# Patient Record
Sex: FEMALE | Race: BLACK OR AFRICAN AMERICAN | Employment: OTHER | URBAN - METROPOLITAN AREA
[De-identification: names, ages, dates, MRNs, and addresses within clinical notes are randomized per-mention and may not be internally consistent; named-entity substitution may affect disease eponyms.]

---

## 2022-06-10 ENCOUNTER — OFFICE VISIT (OUTPATIENT)
Dept: ORTHOPEDIC SURGERY | Age: 87
End: 2022-06-10
Payer: MEDICARE

## 2022-06-10 VITALS — WEIGHT: 156 LBS | BODY MASS INDEX: 31.45 KG/M2 | HEIGHT: 59 IN

## 2022-06-10 DIAGNOSIS — M16.11 PRIMARY OSTEOARTHRITIS OF RIGHT HIP: ICD-10-CM

## 2022-06-10 DIAGNOSIS — M54.16 LUMBAR RADICULOPATHY: Primary | ICD-10-CM

## 2022-06-10 PROCEDURE — G8427 DOCREV CUR MEDS BY ELIG CLIN: HCPCS | Performed by: ORTHOPAEDIC SURGERY

## 2022-06-10 PROCEDURE — 1090F PRES/ABSN URINE INCON ASSESS: CPT | Performed by: ORTHOPAEDIC SURGERY

## 2022-06-10 PROCEDURE — G8536 NO DOC ELDER MAL SCRN: HCPCS | Performed by: ORTHOPAEDIC SURGERY

## 2022-06-10 PROCEDURE — 99205 OFFICE O/P NEW HI 60 MIN: CPT | Performed by: ORTHOPAEDIC SURGERY

## 2022-06-10 PROCEDURE — 1123F ACP DISCUSS/DSCN MKR DOCD: CPT | Performed by: ORTHOPAEDIC SURGERY

## 2022-06-10 PROCEDURE — G8417 CALC BMI ABV UP PARAM F/U: HCPCS | Performed by: ORTHOPAEDIC SURGERY

## 2022-06-10 PROCEDURE — 1101F PT FALLS ASSESS-DOCD LE1/YR: CPT | Performed by: ORTHOPAEDIC SURGERY

## 2022-06-10 PROCEDURE — G8432 DEP SCR NOT DOC, RNG: HCPCS | Performed by: ORTHOPAEDIC SURGERY

## 2022-06-10 RX ORDER — FLECAINIDE ACETATE 50 MG/1
50 TABLET ORAL EVERY 12 HOURS
COMMUNITY
Start: 2022-05-16

## 2022-06-10 RX ORDER — TIMOLOL MALEATE 5 MG/ML
SOLUTION/ DROPS OPHTHALMIC
COMMUNITY
Start: 2022-04-25

## 2022-06-10 RX ORDER — FUROSEMIDE 40 MG/1
20 TABLET ORAL DAILY
COMMUNITY

## 2022-06-10 RX ORDER — LOSARTAN POTASSIUM 50 MG/1
TABLET ORAL
COMMUNITY
Start: 2022-04-11

## 2022-06-10 RX ORDER — METHYLPREDNISOLONE 4 MG/1
4 TABLET ORAL
Qty: 1 DOSE PACK | Refills: 0 | Status: SHIPPED | OUTPATIENT
Start: 2022-06-10 | End: 2022-07-21

## 2022-06-10 RX ORDER — PRAVASTATIN SODIUM 40 MG/1
TABLET ORAL
COMMUNITY
Start: 2022-04-28

## 2022-06-10 RX ORDER — POTASSIUM CHLORIDE 750 MG/1
CAPSULE, EXTENDED RELEASE ORAL
COMMUNITY
Start: 2022-04-28

## 2022-06-10 RX ORDER — RIVAROXABAN 20 MG/1
TABLET, FILM COATED ORAL
COMMUNITY
Start: 2022-04-07

## 2022-06-10 RX ORDER — ACETAMINOPHEN 500 MG
TABLET ORAL
COMMUNITY
End: 2022-08-02

## 2022-06-10 NOTE — PROGRESS NOTES
Teresa Doll (: 3/21/1930) is a 80 y.o. female patient, here for evaluation of the following chief complaint(s):  Back Pain (back pain)       ASSESSMENT/PLAN:  Below is the assessment and plan developed based on review of pertinent history, physical exam, labs, studies, and medications. 70-year-old female comes in today complaining of severe right hip pain. She says been going on for more than 6 months. She used to be very active even 2 years ago and was grocery shopping and driving. Since then because of the hip pain she has stopped doing most of these activities. I independently reviewed her x-rays today. She has severe end-stage right hip arthritis with significant joint destruction. We did very long discussion today. She is 80years old and certainly has significant risk for surgery because of her age. She however has no significant medical problems other than occasional arrhythmia for which she takes flecainide and Xarelto. She will certainly need primary care clearance before surgery. It sounds like this is her biggest problem and she would very much like to be able to be active again. Again risks benefits discussed at length. She said she would like to move forward with scheduling surgery    Risks and benefits of joint arthroplasty discussed at length including but not limited to bleeding, need for blood transfusion, infection, damage to surrounding structures, intra-operative fracture, blood clots, pulmonary embolism, death. The patient understands the risks of surgery. All questions answered. They elected to move forward. 1. Lumbar radiculopathy  -     XR SPINE LUMB 2 OR 3 V; Future  2. Primary osteoarthritis of right hip  -     XR PELV 1 OR 2 V; Future  -     methylPREDNISolone (MEDROL DOSEPACK) 4 mg tablet; Take 1 Tablet by mouth Specific Days and Specific Times. Per dose pack instructions, Normal, Disp-1 Dose Pack, R-0      Encounter Diagnoses   Name Primary?     Lumbar radiculopathy Yes    Primary osteoarthritis of right hip         No follow-ups on file. SUBJECTIVE/OBJECTIVE:  Johnnette Mcardle (: 3/21/1930) is a 80 y.o. female who presents today for the following:  Chief Complaint   Patient presents with    Back Pain     back pain       68-year-old female comes in today complaining of severe right hip pain. She says been going on for more than 6 months. She used to be very active even 2 years ago and was grocery shopping and driving. Since then because of the hip pain she has stopped doing most of these activities. She still walks with a walker but is significantly limited because of the right hip pain. The right leg is significantly shortened compared to the left. She has a severe antalgic gait    IMAGING:  XR Results (most recent):  Results from Appointment encounter on 06/10/22    XR PELV 1 OR 2 V    Narrative  AP pelvis ordered and independently reviewed. Severe destruction right hip joint with no remaining joint space. Left hip well-preserved       Allergies   Allergen Reactions    Aleve [Naproxen Sodium] Unknown (comments)       Current Outpatient Medications   Medication Sig    pravastatin (PRAVACHOL) 40 mg tablet     potassium chloride SA (MICRO-K) 10 mEq capsule     losartan (COZAAR) 50 mg tablet     flecainide (TAMBOCOR) 50 mg tablet Take 50 mg by mouth every twelve (12) hours.  timolol (TIMOPTIC) 0.5 % ophthalmic solution INSTILL 1 DROP INTO BOTH EYES TWICE A DAY    Xarelto 20 mg tab tablet TAKE 1 TABLET BY MOUTH EVERY DAY WITH EVENING MEAL    acetaminophen (Tylenol Extra Strength) 500 mg tablet Take  by mouth every six (6) hours as needed for Pain.  furosemide (LASIX) 40 mg tablet Take  by mouth daily.  methylPREDNISolone (MEDROL DOSEPACK) 4 mg tablet Take 1 Tablet by mouth Specific Days and Specific Times. Per dose pack instructions     No current facility-administered medications for this visit.        Past Medical History: Diagnosis Date    [facility]fib ([facility]     Cancer ([facility]     Hypertension         Past Surgical History:   Procedure Laterality Date    HX HYSTERECTOMY         Family History   Problem Relation Age of Onset    Cancer Sister         Social History     Tobacco Use    Smoking status: Never Smoker    Smokeless tobacco: Never Used   Substance Use Topics    Alcohol use: Not Currently        All systems reviewed x 12 and were negative with the exception of None      No flowsheet data found. Vitals:  Ht 4' 11\" (1.499 m)   Wt 156 lb (70.8 kg)   BMI 31.51 kg/m²    Body mass index is 31.51 kg/m². Physical Exam    General: NAD, well developed, well nourished. Cardiac: Extremities well perfused. Respiratory: Nonlabored breathing. RLE: In a wheelchair. Significant pain with stinchfield. 0-100 degrees of flexion. 0 degrees internal rotation, >30 degrees external rotation. Negative NICOLASA. Significant pain with flexion adduction and internal rotation. .  Motor strength grossly intact. LLE: No antalgic gait. Negative stinchfield. 0-100 degrees of flexion. >20 degrees internal rotation, >30 degrees external rotation. Negative NICOLASA. No pain with flexion adduction and internal rotation. .  Motor strength grossly intact. Skin: Warm well perfused. Vascular: Palpable pedal pulses bilaterally. Equal. Capillary refill less than 2 seconds. An electronic signature was used to authenticate this note.   -- Pedrito Lemons MD

## 2022-07-12 DIAGNOSIS — M16.11 PRIMARY OSTEOARTHRITIS OF RIGHT HIP: Primary | ICD-10-CM

## 2022-07-21 ENCOUNTER — HOSPITAL ENCOUNTER (OUTPATIENT)
Dept: PREADMISSION TESTING | Age: 87
Discharge: HOME | End: 2022-07-21
Payer: MEDICARE

## 2022-07-21 VITALS
WEIGHT: 153 LBS | DIASTOLIC BLOOD PRESSURE: 73 MMHG | SYSTOLIC BLOOD PRESSURE: 176 MMHG | OXYGEN SATURATION: 96 % | HEIGHT: 59 IN | BODY MASS INDEX: 30.84 KG/M2 | HEART RATE: 66 BPM | TEMPERATURE: 98.7 F

## 2022-07-21 LAB
ABO + RH BLD: NORMAL
ANION GAP SERPL CALC-SCNC: 4 MMOL/L (ref 5–15)
APPEARANCE UR: CLEAR
BACTERIA URNS QL MICRO: NEGATIVE /HPF
BILIRUB UR QL: NEGATIVE
BLOOD GROUP ANTIBODIES SERPL: NORMAL
BUN SERPL-MCNC: 13 MG/DL (ref 6–20)
BUN/CREAT SERPL: 19 (ref 12–20)
CALCIUM SERPL-MCNC: 9.5 MG/DL (ref 8.5–10.1)
CHLORIDE SERPL-SCNC: 104 MMOL/L (ref 97–108)
CO2 SERPL-SCNC: 30 MMOL/L (ref 21–32)
COLOR UR: ABNORMAL
CREAT SERPL-MCNC: 0.7 MG/DL (ref 0.55–1.02)
EPITH CASTS URNS QL MICRO: ABNORMAL /LPF
ERYTHROCYTE [DISTWIDTH] IN BLOOD BY AUTOMATED COUNT: 15.3 % (ref 11.5–14.5)
EST. AVERAGE GLUCOSE BLD GHB EST-MCNC: 120 MG/DL
GLUCOSE SERPL-MCNC: 97 MG/DL (ref 65–100)
GLUCOSE UR STRIP.AUTO-MCNC: NEGATIVE MG/DL
HBA1C MFR BLD: 5.8 % (ref 4–5.6)
HCT VFR BLD AUTO: 38.8 % (ref 35–47)
HGB BLD-MCNC: 12.6 G/DL (ref 11.5–16)
HGB UR QL STRIP: NEGATIVE
HYALINE CASTS URNS QL MICRO: ABNORMAL /LPF (ref 0–5)
INR PPP: 1.1 (ref 0.9–1.1)
KETONES UR QL STRIP.AUTO: ABNORMAL MG/DL
LEUKOCYTE ESTERASE UR QL STRIP.AUTO: NEGATIVE
MCH RBC QN AUTO: 28.6 PG (ref 26–34)
MCHC RBC AUTO-ENTMCNC: 32.5 G/DL (ref 30–36.5)
MCV RBC AUTO: 88 FL (ref 80–99)
NITRITE UR QL STRIP.AUTO: NEGATIVE
NRBC # BLD: 0 K/UL (ref 0–0.01)
NRBC BLD-RTO: 0 PER 100 WBC
PH UR STRIP: 6.5 [PH] (ref 5–8)
PLATELET # BLD AUTO: 243 K/UL (ref 150–400)
PMV BLD AUTO: 10.7 FL (ref 8.9–12.9)
POTASSIUM SERPL-SCNC: 4.9 MMOL/L (ref 3.5–5.1)
PROT UR STRIP-MCNC: ABNORMAL MG/DL
PROTHROMBIN TIME: 11.9 SEC (ref 9–11.1)
RBC # BLD AUTO: 4.41 M/UL (ref 3.8–5.2)
RBC #/AREA URNS HPF: ABNORMAL /HPF (ref 0–5)
SODIUM SERPL-SCNC: 138 MMOL/L (ref 136–145)
SP GR UR REFRACTOMETRY: 1.03 (ref 1–1.03)
SPECIMEN EXP DATE BLD: NORMAL
UA: UC IF INDICATED,UAUC: ABNORMAL
UROBILINOGEN UR QL STRIP.AUTO: 1 EU/DL (ref 0.2–1)
WBC # BLD AUTO: 4 K/UL (ref 3.6–11)
WBC URNS QL MICRO: ABNORMAL /HPF (ref 0–4)

## 2022-07-21 PROCEDURE — 36415 COLL VENOUS BLD VENIPUNCTURE: CPT

## 2022-07-21 PROCEDURE — 86900 BLOOD TYPING SEROLOGIC ABO: CPT

## 2022-07-21 PROCEDURE — 80048 BASIC METABOLIC PNL TOTAL CA: CPT

## 2022-07-21 PROCEDURE — 83036 HEMOGLOBIN GLYCOSYLATED A1C: CPT

## 2022-07-21 PROCEDURE — 85027 COMPLETE CBC AUTOMATED: CPT

## 2022-07-21 PROCEDURE — 81001 URINALYSIS AUTO W/SCOPE: CPT

## 2022-07-21 PROCEDURE — 85610 PROTHROMBIN TIME: CPT

## 2022-07-21 NOTE — PERIOP NOTES
[address] [facility] INSTRUCTIONS  ORTHOPAEDIC    Surgery Date:   7/27/22    Your surgeon's office or [facility] staff will call you between 4 PM- 8 PM the day before surgery with your arrival time. If your surgery is on a Monday, you will receive a call the preceding Friday. Please report to [facility] Patient Access/Admitting on the 1st floor. Bring your insurance card, photo identification, and any copayment (if applicable). If you are going home the same day of your surgery, you must have a responsible adult to drive you home. You need to have a responsible adult to stay with you the first 24 hours after surgery and you should not drive a car for 24 hours following your surgery. Do NOT eat any solid foods after midnight the night before surgery including candy, mints or gum. You may drink clear liquids from midnight until 1 hour prior to arrival time. You may drink up to 12 ounces at one time every 4 hours. Do NOT drink alcohol or smoke 24 hours before surgery. STOP smoking for 14 days prior as it helps with breathing and healing after surgery. If your arrival time is 3pm or later, you may eat a light breakfast before 8am (toast, bagel-no butter, black coffee, plain tea, fruit juice-no pulp) Please note special instructions, if applicable, below for medications. If you are being admitted to the hospital,please leave personal belongings/luggage in your car until you have an assigned hospital room number. Please wear comfortable clothes. Wear your glasses instead of contacts. We ask that all money, jewelry and valuables be left at home. Wear no make up, particularly mascara, the day of surgery. All body piercings, rings, and jewelry need to be removed and left at home. Please remove any nail polish or artificial nails from your fingernails. Please wear your hair loose or down. Please no pony-tails, buns, or any metal hair accessories.  If you shower the morning of surgery, please do not apply any lotions or powders afterwards. You may wear deodorant. Do not shave any body area within 24 hours of your surgery. Please follow all instructions to avoid any potential surgical cancellation. Should your physical condition change, (i.e. fever, cold, flu, etc.) please notify your surgeon as soon as possible. It is important to be on time. If a situation occurs where you may be delayed, please call:  [phone] / [phone] on the day of surgery. The Preadmission Testing staff can be reached at [phone]. Special instructions: BRING YOUR OVERNIGHT BAG    Current Outpatient Medications   Medication Sig    pravastatin (PRAVACHOL) 40 mg tablet     potassium chloride SA (MICRO-K) 10 mEq capsule     losartan (COZAAR) 50 mg tablet     flecainide (TAMBOCOR) 50 mg tablet Take 50 mg by mouth every twelve (12) hours. timolol (TIMOPTIC) 0.5 % ophthalmic solution INSTILL 1 DROP INTO BOTH EYES TWICE A DAY    Xarelto 20 mg tab tablet TAKE 1 TABLET BY MOUTH EVERY DAY WITH EVENING MEAL    acetaminophen (TYLENOL) 500 mg tablet Take  by mouth every six (6) hours as needed for Pain. furosemide (LASIX) 40 mg tablet Take 20 mg by mouth in the morning. No current facility-administered medications for this encounter. YOU MUST ONLY TAKE THESE MEDICATIONS THE MORNING OF SURGERY WITH A SIP OF WATER: FLECAINIDE  MEDICATIONS TO TAKE THE MORNING OF SURGERY ONLY IF NEEDED: TYLENOL  HOLD these prescription medications BEFORE Surgery: NONE  Ask your surgeon/prescribing physician about when/if to STOP taking these medications: Leola Morris  Stop any non-steroidal anti-inflammatory drugs (i.e. Ibuprofen, Naproxen, Advil, Aleve) 3 days before surgery. You may take Tylenol. STOP all vitamins and herbal supplements 1 week prior to  surgery. If you are currently taking Plavix, Coumadin, or any other blood-thinning/anticoagulant medication contact your prescribing physician for instructions.     Preventing Infections Before and After - Your Surgery    IMPORTANT INSTRUCTIONS    You play an important role in your health and preparation for surgery. To reduce the germs on your skin you will need to shower with CHG soap (Chorhexidine gluconate 4%) two times before surgery. CHG soap (Hibiclens, Hex-A-Clens or store brand)  CHG soap will be provided at your Preadmission Testing (PAT) appointment. If you do not have a PAT appointment before surgery, you may arrange to  CHG soap from our office or purchase CHG soap at a pharmacy, grocery or department store. You need to purchase TWO 4 ounce bottles to use for your 2 showers. Steps to follow:  Brandon Fake your hair with your normal shampoo and your body with regular soap and rinse well to remove shampoo and soap from your skin. Wet a clean washcloth and turn off the shower. Put CHG soap on washcloth and apply to your entire body from the neck down. Do not use on your head, face or private parts(genitals). Do not use CHG soap on open sores, wounds or areas of skin irritation. Wash you body gently for 5 minutes. Do not wash your skin too hard. This soap does not create lather. Pay special attention to your underarms and from your belly button to your feet. Turn the shower back on and rinse well to get CHG soap off your body. Pat your skin dry with a clean, dry towel. Do not apply lotions or moisturizer. Put on clean clothes and sleep on fresh bed sheets and do not allow pets to sleep with you. Shower with CHG soap 2 times before your surgery  The evening before your surgery  The morning of your surgery      Tips to help prevent infections after your surgery:  Protect your surgical wound from germs:  Hand washing is the most important thing you and your caregivers can do to prevent infections. Keep your bandage clean and dry! Do not touch your surgical wound.   Use clean, freshly washed towels and washcloths every time you shower; do not share bath linens with others. Until your surgical wound is healed, wear clothing and sleep on bed linens each day that are clean and freshly washed. Do not allow pets to sleep in your bed with you or touch your surgical wound. Do not smoke - smoking delays wound healing. This may be a good time to stop smoking. If you have diabetes, it is important for you to manage your blood sugar levels properly before your surgery as well as after your surgery. Poorly managed blood sugar levels slow down wound healing and prevent you from healing completely. Prevention of Infection  Testing for Staphylococcus aureus on your skin before surgery    Staphylococcus aureus (staph) is a common bacteria that is found on the body. It normally does not cause infection on healthy skin. Before surgery, you will be tested to see if you have staph by swabbing the inside of your nose. When you have an incision with surgery, the goal is to protect that incision from infection. Removal of the staph bacteria before surgery can decrease the risk of a surgical site infection. If your nose swab is positive for staph you will be called. Your treatment will include 2 steps:  Prescription for Mupirocin ointment to be used in each nostril twice a day for 5 days. Showering with Chlorhexidine (CHG) liquid soap for 5 days prior to surgery. How to use Mupirocin ointment in your nose   the prescription from your pharmacy. You will receive a large tube of ointment which will be big enough for all of your treatments. You will apply this ointment to each nostril 2 times a day for 5 days. Wash your hands with  gel or soap and water for 20 seconds before using ointment. Place a pea-sized amount of ointment on a cotton Q-tip. Apply ointment just inside of each nostril with the Q-tip. Do not push Q-tip or ointment deep inside you nose. Press your nostrils together and massage for a few seconds.   Wash your hands with  gel or soap and water after you are finished. Do not get ointment near your eyes. If it gets into your eyes, rinse them with cool water. If you need to use nasal spray, clean the tip of the bottle with alcohol before use and do not use both at the same time. If you are scheduled for COVID testing during the 5 days, do NOT apply morning dose until after the COVID test has been performed. How to use Chlorhexidine (CHG) 4% liquid soap  Purchase an 8 ounce bottle of CHG liquid soap (Chlorhexidine 4%, Hibiclens, Hex-A-Clens or store brand) at a pharmacy or grocery store. Wash your hair with your normal shampoo and your body with regular soap and rinse well to remove shampoo and soap from your skin. Wet a clean washcloth and turn off the shower. Put CHG soap on washcloth and apply to your entire body from the neck down. Do not use on your head, face or private parts(genitals). Do not use CHG soap on open sores, wounds or areas of skin irritation. Wash your body gently for 5 minutes. Do not wash your skin too hard. This soap does not create lather. Pay special attention to your underarms and from your belly button to your feet. Turn the shower back on and rinse well to get CHG soap off your body. Pat your skin dry with a clean, dry towel. Do not apply lotions or moisturizer. Put on clean clothes and sleep on fresh bed sheets the night before surgery. Do not allow pets to sleep with you. Eating and Drinking Before Surgery    You may eat a regular dinner at the usual time on the day before your surgery. Do NOT eat any solid foods after midnight unless your arrival time at the hospital is 3pm or later. You may drink clear liquids only from 12 midnight until 1 hours prior to your arrival time at the hospital on the day of your surgery. Do NOT drink alcohol.   Clear liquids include:  Water  Fruit juices without pulp( i.e. apple juice)  Carbonated beverages  Black coffee (no cream/milk)  Tea (no cream/milk)  Gatorade  You may drink up to 12-16 ounces at one time every 4 hours between the hours of midnight and 1 hour before your arrival time at the hospital. Example- if your arrival time at the hospital is 6am, you may drink 12-16 ounces of clear liquids no later than 5am.  If your arrival time at the hospital is 3pm or later, you may eat a light breakfast before 8am.  A light breakfast includes: Toast or bagel (no butter)  Black coffee (no cream/milk)  Tea (no cream/milk)  Fruit juices without pulp ( i.e. apple juice)  Do NOT eat meat, eggs, vegetables or fruit  If you have any questions, please contact your surgeon's office. Patient Information Regarding COVID Restrictions    Day of Procedure    Please park in the parking deck or any designated visitor parking lot. Enter the facility through the Main Entrance of the hospital.  On the day of surgery, please provide the cell phone number of the person who will be waiting for you to the Patient Access representative at the time of registration. Please wear a mask on the day of your procedure. We are now allowing two designated visitors per stay. Pediatric patients may have 2 designated visitors. These two people may come in with you on the day of your procedure. The designated visitor must also wear a mask. Once your procedure and the immediate recovery period is completed, a nurse in the recovery area will contact your designated visitor to inform them of your room number or to otherwise review other pertinent information regarding your care. Social distancing practices are to be adhered to in waiting areas and the cafeteria. The patient and daughter was contacted in person. They verbalized understanding of all instructions and do not need reinforcement.

## 2022-07-22 LAB
BACTERIA SPEC CULT: NORMAL
BACTERIA SPEC CULT: NORMAL
SERVICE CMNT-IMP: NORMAL

## 2022-07-22 NOTE — PERIOP NOTES
CC MESSAGE SENT TO DR LINK'S NURSE:    Fernando Uribe,    Please note pt's PT was 11.9 in PAT on 7/21/22. Please notify Dr. Sheldon Maradiaga. Thanks! RESULTS ALSO SENT TO PCP VIA CC FAX.

## 2022-07-22 NOTE — PERIOP NOTES
PAT Nurse Practitioner   Pre-Operative Chart Review/Assessment:-ORTHOPEDIC                Patient Name:  Nirmala Beach                                                           Age:   80 y.o.    :  3/21/1930     Today's Date:  2022     Date of PAT:   2022      Date of Surgery:    2022      Procedure(s):  Right Total Hip Arthroplasty     Surgeon:   Dr. Arnaud Rose                       PLAN:      1)  Medical Clearance:  Dr. Maricela Carrel      2)  Cardiac Clearance:  Pt followed by Dr. Madeline Faustin. LOV 22. Clearance obtained. OV note, ECHO, stress test and EKG on chart. 3)  Diabetic Treatment Consult:  Not indicated. A1c-5.8      4)  Sleep Apnea evaluation:   Not indicated. ANGELIC Score 2.       5) Treatment for MRSA/Staph Aureus:  Neg      6) Additional Concerns:  HTN, PUD, A-fib, hx of gastric CA    Pt wants d/c to rehab. CM consult ordered for admission. Vital Signs:         Vitals:    22 1133   BP: (!) 176/73   Pulse: 66   Temp: 98.7 °F (37.1 °C)   SpO2: 96%   Weight: 69.4 kg (153 lb)   Height: 4' 11\" (1.499 m)          Body mass index is 30.9 kg/m².         ____________________________________________  PAST MEDICAL HISTORY  Past Medical History:   Diagnosis Date    A-fib ([address]     Arthritis     Cancer ([address]     STOMACH    Hypertension     PUD (peptic ulcer disease)       ____________________________________________  PAST SURGICAL HISTORY  Past Surgical History:   Procedure Laterality Date    HX HEENT Right     RESTORED VISION    HX HYSTERECTOMY  1971    FL ABDOMEN SURGERY PROC UNLISTED      REMOVED CANCER FROM STOMACH      ____________________________________________  HOME MEDICATIONS  Current Outpatient Medications   Medication Sig    pravastatin (PRAVACHOL) 40 mg tablet     potassium chloride SA (MICRO-K) 10 mEq capsule     losartan (COZAAR) 50 mg tablet     flecainide (TAMBOCOR) 50 mg tablet Take 50 mg by mouth every twelve (12) hours.     timolol (TIMOPTIC) 0.5 % ophthalmic solution INSTILL 1 DROP INTO BOTH EYES TWICE A DAY    Xarelto 20 mg tab tablet TAKE 1 TABLET BY MOUTH EVERY DAY WITH EVENING MEAL    acetaminophen (TYLENOL) 500 mg tablet Take  by mouth every six (6) hours as needed for Pain. furosemide (LASIX) 40 mg tablet Take 20 mg by mouth in the morning.      No current facility-administered medications for this encounter.      ____________________________________________  ALLERGIES  Allergies   Allergen Reactions    Aleve [Naproxen Sodium] Unknown (comments)      ____________________________________________  SOCIAL HISTORY  Social History     Tobacco Use    Smoking status: Never    Smokeless tobacco: Never   Substance Use Topics    Alcohol use: Not Currently      ____________________________________________   Internal Administration   First Dose COVID-19, PFIZER PURPLE top, DILUTE for use, (age 15 y+), IM, 30mcg/0.3mL  02/25/2021   Second Dose COVID-19, PFIZER PURPLE top, DILUTE for use, (age 15 y+), IM, 30mcg/0.3mL  03/25/2021      Last COVID Lab No results found for: Giles Curiel, RCV2CT, CVD2M, Vanessa Vanessa, XPLCVT, [address] [facility], [address] [place] [address], [address] [place] [address] Pauline Finn, [id number] [address] [place]                 Labs:     Hospital Outpatient Visit on 07/21/2022   Component Date Value Ref Range Status    Sodium 07/21/2022 138  136 - 145 mmol/L Final    Potassium 07/21/2022 4.9  3.5 - 5.1 mmol/L Final    Chloride 07/21/2022 104  97 - 108 mmol/L Final    CO2 07/21/2022 30  21 - 32 mmol/L Final    Anion gap 07/21/2022 4 (A) 5 - 15 mmol/L Final    Glucose 07/21/2022 97  65 - 100 mg/dL Final    BUN 07/21/2022 13  6 - 20 MG/DL Final    Creatinine 07/21/2022 0.70  0.55 - 1.02 MG/DL Final    BUN/Creatinine ratio 07/21/2022 19  12 - 20   Final    GFR est AA 07/21/2022 >60  >60 ml/min/1.73m2 Final    GFR est non-AA 07/21/2022 >60  >60 ml/min/1.73m2 Final    Estimated GFR is calculated using the IDMS-traceable Modification of Diet in Renal Disease (MDRD) Study equation, reported for both  Americans (GFRAA) and non- Americans (GFRNA), and normalized to 1.73m2 body surface area. The physician must decide which value applies to the patient. Calcium 07/21/2022 9.5  8.5 - 10.1 MG/DL Final    WBC 07/21/2022 4.0  3.6 - 11.0 K/uL Final    RBC 07/21/2022 4.41  3.80 - 5.20 M/uL Final    HGB 07/21/2022 12.6  11.5 - 16.0 g/dL Final    HCT 07/21/2022 38.8  35.0 - 47.0 % Final    MCV 07/21/2022 88.0  80.0 - 99.0 FL Final    MCH 07/21/2022 28.6  26.0 - 34.0 PG Final    MCHC 07/21/2022 32.5  30.0 - 36.5 g/dL Final    RDW 07/21/2022 15.3 (A) 11.5 - 14.5 % Final    PLATELET 94/13/3670 495  150 - 400 K/uL Final    MPV 07/21/2022 10.7  8.9 - 12.9 FL Final    NRBC 07/21/2022 0.0  0  WBC Final    ABSOLUTE NRBC 07/21/2022 0.00  0.00 - 0.01 K/uL Final    Crossmatch Expiration 07/21/2022 07/30/2022,2359   Final    ABO/Rh(D) 07/21/2022 A POSITIVE   Final    Antibody screen 07/21/2022 NEG   Final    INR 07/21/2022 1.1  0.9 - 1.1   Final    A single therapeutic range for Vit K antagonists may not be optimal for all indications - see June, 2008 issue of Chest, American College of Chest Physicians Evidence-Based Clinical Practice Guidelines, 8th Edition.     Prothrombin time 07/21/2022 11.9 (A) 9.0 - 11.1 sec Final    Color 07/21/2022 YELLOW/STRAW    Final    Color Reference Range: Straw, Yellow or Dark Yellow    Appearance 07/21/2022 CLEAR  CLEAR   Final    Specific gravity 07/21/2022 1.029  1.003 - 1.030   Final    pH (UA) 07/21/2022 6.5  5.0 - 8.0   Final    Protein 07/21/2022 TRACE (A) NEG mg/dL Final    Glucose 07/21/2022 Negative  NEG mg/dL Final    Ketone 07/21/2022 TRACE (A) NEG mg/dL Final    Bilirubin 07/21/2022 Negative  NEG   Final    Blood 07/21/2022 Negative  NEG   Final    Urobilinogen 07/21/2022 1.0  0.2 - 1.0 EU/dL Final    Nitrites 07/21/2022 Negative  NEG   Final    Leukocyte Esterase 07/21/2022 Negative  NEG   Final    UA:UC IF INDICATED 07/21/2022 CULTURE NOT INDICATED BY UA RESULT  CNI   Final    WBC 07/21/2022 0-4  0 - 4 /hpf Final    RBC 07/21/2022 0-5  0 - 5 /hpf Final    Epithelial cells 07/21/2022 FEW  FEW /lpf Final    Epithelial cell category consists of squamous cells and /or transitional urothelial cells. Renal tubular cells, if present, are separately identified as such. Bacteria 07/21/2022 Negative  NEG /hpf Final    Hyaline cast 07/21/2022 0-2  0 - 5 /lpf Final    Hemoglobin A1c 07/21/2022 5.8 (A) 4.0 - 5.6 % Final    Comment: NEW METHOD  PLEASE NOTE NEW REFERENCE RANGE  (NOTE)  HbA1C Interpretive Ranges  <5.7              Normal  5.7 - 6.4         Consider Prediabetes  >6.5              Consider Diabetes      Est. average glucose 07/21/2022 120  mg/dL Final    Special Requests: 07/21/2022 NO SPECIAL REQUESTS    Final    Culture result: 07/21/2022 MRSA NOT PRESENT    Final    Culture result: 07/21/2022     Final                    Value:Screening of patient nares for MRSA is for surveillance purposes and, if positive, to facilitate isolation considerations in high risk settings. It is not intended for automatic decolonization interventions per se as regimens are not sufficiently effective to warrant routine use. Skin:     Denies open wounds, cuts, sores, rashes or other areas of concern in PAT assessment.           Skip Him, NP

## 2022-07-27 ENCOUNTER — APPOINTMENT (OUTPATIENT)
Dept: GENERAL RADIOLOGY | Age: 87
End: 2022-07-27
Attending: PHYSICIAN ASSISTANT
Payer: MEDICARE

## 2022-07-27 ENCOUNTER — HOSPITAL ENCOUNTER (OUTPATIENT)
Age: 87
Setting detail: OBSERVATION
Discharge: NURSING, REHAB OR LONG-TERM CARE | End: 2022-08-02
Attending: ORTHOPAEDIC SURGERY | Admitting: ORTHOPAEDIC SURGERY
Payer: MEDICARE

## 2022-07-27 ENCOUNTER — APPOINTMENT (OUTPATIENT)
Dept: GENERAL RADIOLOGY | Age: 87
End: 2022-07-27
Attending: ORTHOPAEDIC SURGERY
Payer: MEDICARE

## 2022-07-27 ENCOUNTER — ANESTHESIA EVENT (OUTPATIENT)
Dept: SURGERY | Age: 87
End: 2022-07-27
Payer: MEDICARE

## 2022-07-27 ENCOUNTER — ANESTHESIA (OUTPATIENT)
Dept: SURGERY | Age: 87
End: 2022-07-27
Payer: MEDICARE

## 2022-07-27 DIAGNOSIS — Z96.641 S/P TOTAL RIGHT HIP ARTHROPLASTY: ICD-10-CM

## 2022-07-27 DIAGNOSIS — M16.11 OSTEOARTHRITIS OF RIGHT HIP, UNSPECIFIED OSTEOARTHRITIS TYPE: Primary | ICD-10-CM

## 2022-07-27 LAB
GLUCOSE BLD STRIP.AUTO-MCNC: 89 MG/DL (ref 65–117)
SERVICE CMNT-IMP: NORMAL

## 2022-07-27 PROCEDURE — 77030011264 HC ELECTRD BLD EXT COVD -A: Performed by: ORTHOPAEDIC SURGERY

## 2022-07-27 PROCEDURE — 76010000162 HC OR TIME 1.5 TO 2 HR INTENSV-TIER 1: Performed by: ORTHOPAEDIC SURGERY

## 2022-07-27 PROCEDURE — 76210000000 HC OR PH I REC 2 TO 2.5 HR: Performed by: ORTHOPAEDIC SURGERY

## 2022-07-27 PROCEDURE — 77030018547 HC SUT ETHBND1 J&J -B: Performed by: ORTHOPAEDIC SURGERY

## 2022-07-27 PROCEDURE — 77030035236 HC SUT PDS STRATFX BARB J&J -B: Performed by: ORTHOPAEDIC SURGERY

## 2022-07-27 PROCEDURE — 77030010507 HC ADH SKN DERMBND J&J -B: Performed by: ORTHOPAEDIC SURGERY

## 2022-07-27 PROCEDURE — 77030006802 HC BLD SAW RECIP BRSM -B: Performed by: ORTHOPAEDIC SURGERY

## 2022-07-27 PROCEDURE — 77030041279 HC DRSG PRMSL AG MDII -B: Performed by: ORTHOPAEDIC SURGERY

## 2022-07-27 PROCEDURE — 74011000250 HC RX REV CODE- 250: Performed by: ORTHOPAEDIC SURGERY

## 2022-07-27 PROCEDURE — 77030019557 HC ELECTRD VES SEAL MEDT -F: Performed by: ORTHOPAEDIC SURGERY

## 2022-07-27 PROCEDURE — 74011000250 HC RX REV CODE- 250: Performed by: PHYSICIAN ASSISTANT

## 2022-07-27 PROCEDURE — 74011000258 HC RX REV CODE- 258: Performed by: ORTHOPAEDIC SURGERY

## 2022-07-27 PROCEDURE — 76060000034 HC ANESTHESIA 1.5 TO 2 HR: Performed by: ORTHOPAEDIC SURGERY

## 2022-07-27 PROCEDURE — 74011000258 HC RX REV CODE- 258: Performed by: NURSE ANESTHETIST, CERTIFIED REGISTERED

## 2022-07-27 PROCEDURE — 77030005513 HC CATH URETH FOL11 MDII -B: Performed by: ORTHOPAEDIC SURGERY

## 2022-07-27 PROCEDURE — 74011250637 HC RX REV CODE- 250/637: Performed by: PHYSICIAN ASSISTANT

## 2022-07-27 PROCEDURE — 2709999900 HC NON-CHARGEABLE SUPPLY: Performed by: ORTHOPAEDIC SURGERY

## 2022-07-27 PROCEDURE — 74011250636 HC RX REV CODE- 250/636: Performed by: ANESTHESIOLOGY

## 2022-07-27 PROCEDURE — 27130 TOTAL HIP ARTHROPLASTY: CPT | Performed by: PHYSICIAN ASSISTANT

## 2022-07-27 PROCEDURE — 74011000250 HC RX REV CODE- 250: Performed by: ANESTHESIOLOGY

## 2022-07-27 PROCEDURE — 2709999900 HC NON-CHARGEABLE SUPPLY

## 2022-07-27 PROCEDURE — 77030007866 HC KT SPN ANES BBMI -B: Performed by: ANESTHESIOLOGY

## 2022-07-27 PROCEDURE — 74011000250 HC RX REV CODE- 250: Performed by: NURSE ANESTHETIST, CERTIFIED REGISTERED

## 2022-07-27 PROCEDURE — 74011250636 HC RX REV CODE- 250/636: Performed by: PHYSICIAN ASSISTANT

## 2022-07-27 PROCEDURE — 74011250636 HC RX REV CODE- 250/636: Performed by: NURSE ANESTHETIST, CERTIFIED REGISTERED

## 2022-07-27 PROCEDURE — 77030031139 HC SUT VCRL2 J&J -A: Performed by: ORTHOPAEDIC SURGERY

## 2022-07-27 PROCEDURE — 27130 TOTAL HIP ARTHROPLASTY: CPT | Performed by: ORTHOPAEDIC SURGERY

## 2022-07-27 PROCEDURE — 72170 X-RAY EXAM OF PELVIS: CPT

## 2022-07-27 PROCEDURE — C9290 INJ, BUPIVACAINE LIPOSOME: HCPCS | Performed by: ORTHOPAEDIC SURGERY

## 2022-07-27 PROCEDURE — 77030002933 HC SUT MCRYL J&J -A: Performed by: ORTHOPAEDIC SURGERY

## 2022-07-27 PROCEDURE — 74011250636 HC RX REV CODE- 250/636: Performed by: ORTHOPAEDIC SURGERY

## 2022-07-27 PROCEDURE — 77030036660

## 2022-07-27 PROCEDURE — C1776 JOINT DEVICE (IMPLANTABLE): HCPCS | Performed by: ORTHOPAEDIC SURGERY

## 2022-07-27 PROCEDURE — 77030020788: Performed by: ORTHOPAEDIC SURGERY

## 2022-07-27 PROCEDURE — 82962 GLUCOSE BLOOD TEST: CPT

## 2022-07-27 PROCEDURE — 73501 X-RAY EXAM HIP UNI 1 VIEW: CPT

## 2022-07-27 PROCEDURE — 74011250636 HC RX REV CODE- 250/636

## 2022-07-27 DEVICE — PINNACLE CANCELLOUS BONE SCREW 6.5MM X 35MM
Type: IMPLANTABLE DEVICE | Site: HIP | Status: FUNCTIONAL
Brand: PINNACLE

## 2022-07-27 DEVICE — PINNACLE GRIPTION ACETABULAR SHELL SECTOR 48MM OD
Type: IMPLANTABLE DEVICE | Site: HIP | Status: FUNCTIONAL
Brand: PINNACLE GRIPTION

## 2022-07-27 DEVICE — ACTIS DUOFIX HIP PROSTHESIS (FEMORAL STEM 12/14 TAPER CEMENTLESS SIZE 5 STD COLLAR)  CE
Type: IMPLANTABLE DEVICE | Site: HIP | Status: FUNCTIONAL
Brand: ACTIS

## 2022-07-27 DEVICE — HIP H2 TOT ADV OTHER HD IMPL CAPPED SYNTHES: Type: IMPLANTABLE DEVICE | Status: FUNCTIONAL

## 2022-07-27 DEVICE — PINNACLE CANCELLOUS BONE SCREW 6.5MM X 25MM
Type: IMPLANTABLE DEVICE | Site: HIP | Status: FUNCTIONAL
Brand: PINNACLE

## 2022-07-27 DEVICE — HEAD FEM DIA32MM +1MM OFFSET 12/14 TAPR HIP CERAMIC BIOLOX: Type: IMPLANTABLE DEVICE | Site: HIP | Status: FUNCTIONAL

## 2022-07-27 DEVICE — PINNACLE HIP SOLUTIONS ALTRX POLYETHYLENE ACETABULAR LINER NEUTRAL 32MM ID 48MM OD
Type: IMPLANTABLE DEVICE | Site: HIP | Status: FUNCTIONAL
Brand: PINNACLE ALTRX

## 2022-07-27 RX ORDER — PHENYLEPHRINE HCL IN 0.9% NACL 0.4MG/10ML
SYRINGE (ML) INTRAVENOUS
Status: DISCONTINUED | OUTPATIENT
Start: 2022-07-27 | End: 2022-07-27 | Stop reason: HOSPADM

## 2022-07-27 RX ORDER — TRAMADOL HYDROCHLORIDE 50 MG/1
50 TABLET ORAL
Status: DISCONTINUED | OUTPATIENT
Start: 2022-07-27 | End: 2022-08-02 | Stop reason: HOSPADM

## 2022-07-27 RX ORDER — FENTANYL CITRATE 50 UG/ML
25 INJECTION, SOLUTION INTRAMUSCULAR; INTRAVENOUS
Status: DISCONTINUED | OUTPATIENT
Start: 2022-07-27 | End: 2022-07-27 | Stop reason: HOSPADM

## 2022-07-27 RX ORDER — MORPHINE SULFATE 2 MG/ML
2 INJECTION, SOLUTION INTRAMUSCULAR; INTRAVENOUS
Status: DISCONTINUED | OUTPATIENT
Start: 2022-07-27 | End: 2022-07-27 | Stop reason: HOSPADM

## 2022-07-27 RX ORDER — AMOXICILLIN 250 MG
1 CAPSULE ORAL 2 TIMES DAILY
Status: DISCONTINUED | OUTPATIENT
Start: 2022-07-27 | End: 2022-08-02 | Stop reason: HOSPADM

## 2022-07-27 RX ORDER — PHENYLEPHRINE HCL IN 0.9% NACL 0.4MG/10ML
SYRINGE (ML) INTRAVENOUS AS NEEDED
Status: DISCONTINUED | OUTPATIENT
Start: 2022-07-27 | End: 2022-07-27 | Stop reason: HOSPADM

## 2022-07-27 RX ORDER — SODIUM CHLORIDE, SODIUM LACTATE, POTASSIUM CHLORIDE, CALCIUM CHLORIDE 600; 310; 30; 20 MG/100ML; MG/100ML; MG/100ML; MG/100ML
50 INJECTION, SOLUTION INTRAVENOUS CONTINUOUS
Status: DISCONTINUED | OUTPATIENT
Start: 2022-07-27 | End: 2022-07-27 | Stop reason: HOSPADM

## 2022-07-27 RX ORDER — ACETAMINOPHEN 325 MG/1
650 TABLET ORAL EVERY 6 HOURS
Status: DISCONTINUED | OUTPATIENT
Start: 2022-07-27 | End: 2022-08-02 | Stop reason: HOSPADM

## 2022-07-27 RX ORDER — PROPOFOL 10 MG/ML
INJECTION, EMULSION INTRAVENOUS
Status: DISCONTINUED | OUTPATIENT
Start: 2022-07-27 | End: 2022-07-27 | Stop reason: HOSPADM

## 2022-07-27 RX ORDER — LOSARTAN POTASSIUM 50 MG/1
50 TABLET ORAL DAILY
Status: DISCONTINUED | OUTPATIENT
Start: 2022-07-28 | End: 2022-08-02 | Stop reason: HOSPADM

## 2022-07-27 RX ORDER — FAMOTIDINE 20 MG/1
20 TABLET, FILM COATED ORAL 2 TIMES DAILY
Status: DISCONTINUED | OUTPATIENT
Start: 2022-07-27 | End: 2022-07-29

## 2022-07-27 RX ORDER — ONDANSETRON 2 MG/ML
INJECTION INTRAMUSCULAR; INTRAVENOUS AS NEEDED
Status: DISCONTINUED | OUTPATIENT
Start: 2022-07-27 | End: 2022-07-27 | Stop reason: HOSPADM

## 2022-07-27 RX ORDER — PREGABALIN 75 MG/1
75 CAPSULE ORAL ONCE
Status: COMPLETED | OUTPATIENT
Start: 2022-07-27 | End: 2022-07-27

## 2022-07-27 RX ORDER — SODIUM CHLORIDE, SODIUM LACTATE, POTASSIUM CHLORIDE, CALCIUM CHLORIDE 600; 310; 30; 20 MG/100ML; MG/100ML; MG/100ML; MG/100ML
INJECTION, SOLUTION INTRAVENOUS
Status: DISCONTINUED | OUTPATIENT
Start: 2022-07-27 | End: 2022-07-27 | Stop reason: HOSPADM

## 2022-07-27 RX ORDER — PROPOFOL 10 MG/ML
INJECTION, EMULSION INTRAVENOUS AS NEEDED
Status: DISCONTINUED | OUTPATIENT
Start: 2022-07-27 | End: 2022-07-27 | Stop reason: HOSPADM

## 2022-07-27 RX ORDER — PRAVASTATIN SODIUM 40 MG/1
40 TABLET ORAL DAILY
Status: DISCONTINUED | OUTPATIENT
Start: 2022-07-28 | End: 2022-08-02 | Stop reason: HOSPADM

## 2022-07-27 RX ORDER — ACETAMINOPHEN 500 MG
1000 TABLET ORAL ONCE
Status: COMPLETED | OUTPATIENT
Start: 2022-07-27 | End: 2022-07-27

## 2022-07-27 RX ORDER — SODIUM CHLORIDE 0.9 % (FLUSH) 0.9 %
5-40 SYRINGE (ML) INJECTION EVERY 8 HOURS
Status: DISCONTINUED | OUTPATIENT
Start: 2022-07-27 | End: 2022-07-27 | Stop reason: HOSPADM

## 2022-07-27 RX ORDER — GLYCOPYRROLATE 0.2 MG/ML
0.2 INJECTION INTRAMUSCULAR; INTRAVENOUS ONCE
Status: COMPLETED | OUTPATIENT
Start: 2022-07-27 | End: 2022-07-27

## 2022-07-27 RX ORDER — SODIUM CHLORIDE 0.9 % (FLUSH) 0.9 %
5-40 SYRINGE (ML) INJECTION AS NEEDED
Status: DISCONTINUED | OUTPATIENT
Start: 2022-07-27 | End: 2022-08-02 | Stop reason: HOSPADM

## 2022-07-27 RX ORDER — SODIUM CHLORIDE 0.9 % (FLUSH) 0.9 %
5-40 SYRINGE (ML) INJECTION AS NEEDED
Status: DISCONTINUED | OUTPATIENT
Start: 2022-07-27 | End: 2022-07-27 | Stop reason: HOSPADM

## 2022-07-27 RX ORDER — SODIUM CHLORIDE 9 MG/ML
75 INJECTION, SOLUTION INTRAVENOUS CONTINUOUS
Status: DISCONTINUED | OUTPATIENT
Start: 2022-07-27 | End: 2022-07-28

## 2022-07-27 RX ORDER — ONDANSETRON 2 MG/ML
4 INJECTION INTRAMUSCULAR; INTRAVENOUS AS NEEDED
Status: DISCONTINUED | OUTPATIENT
Start: 2022-07-27 | End: 2022-07-27 | Stop reason: HOSPADM

## 2022-07-27 RX ORDER — FUROSEMIDE 20 MG/1
20 TABLET ORAL DAILY
Status: DISCONTINUED | OUTPATIENT
Start: 2022-07-28 | End: 2022-08-02 | Stop reason: HOSPADM

## 2022-07-27 RX ORDER — FACIAL-BODY WIPES
10 EACH TOPICAL DAILY PRN
Status: DISCONTINUED | OUTPATIENT
Start: 2022-07-29 | End: 2022-08-02 | Stop reason: HOSPADM

## 2022-07-27 RX ORDER — HYDROMORPHONE HYDROCHLORIDE 1 MG/ML
0.2 INJECTION, SOLUTION INTRAMUSCULAR; INTRAVENOUS; SUBCUTANEOUS
Status: DISCONTINUED | OUTPATIENT
Start: 2022-07-27 | End: 2022-07-27 | Stop reason: HOSPADM

## 2022-07-27 RX ORDER — SODIUM CHLORIDE 0.9 % (FLUSH) 0.9 %
5-40 SYRINGE (ML) INJECTION EVERY 8 HOURS
Status: DISCONTINUED | OUTPATIENT
Start: 2022-07-27 | End: 2022-08-02 | Stop reason: HOSPADM

## 2022-07-27 RX ORDER — HYDROMORPHONE HYDROCHLORIDE 1 MG/ML
0.5 INJECTION, SOLUTION INTRAMUSCULAR; INTRAVENOUS; SUBCUTANEOUS
Status: ACTIVE | OUTPATIENT
Start: 2022-07-27 | End: 2022-07-28

## 2022-07-27 RX ORDER — LIDOCAINE HYDROCHLORIDE 10 MG/ML
0.1 INJECTION, SOLUTION EPIDURAL; INFILTRATION; INTRACAUDAL; PERINEURAL AS NEEDED
Status: DISCONTINUED | OUTPATIENT
Start: 2022-07-27 | End: 2022-07-27 | Stop reason: HOSPADM

## 2022-07-27 RX ORDER — FLECAINIDE ACETATE 50 MG/1
50 TABLET ORAL EVERY 12 HOURS
Status: DISCONTINUED | OUTPATIENT
Start: 2022-07-27 | End: 2022-08-02 | Stop reason: HOSPADM

## 2022-07-27 RX ORDER — ONDANSETRON 2 MG/ML
4 INJECTION INTRAMUSCULAR; INTRAVENOUS
Status: ACTIVE | OUTPATIENT
Start: 2022-07-27 | End: 2022-07-28

## 2022-07-27 RX ORDER — NALOXONE HYDROCHLORIDE 0.4 MG/ML
0.4 INJECTION, SOLUTION INTRAMUSCULAR; INTRAVENOUS; SUBCUTANEOUS AS NEEDED
Status: DISCONTINUED | OUTPATIENT
Start: 2022-07-27 | End: 2022-08-02 | Stop reason: HOSPADM

## 2022-07-27 RX ORDER — BUPIVACAINE HYDROCHLORIDE 7.5 MG/ML
INJECTION, SOLUTION EPIDURAL; RETROBULBAR
Status: COMPLETED | OUTPATIENT
Start: 2022-07-27 | End: 2022-07-27

## 2022-07-27 RX ORDER — POTASSIUM CHLORIDE 750 MG/1
10 TABLET, FILM COATED, EXTENDED RELEASE ORAL DAILY
Status: DISCONTINUED | OUTPATIENT
Start: 2022-07-28 | End: 2022-08-02 | Stop reason: HOSPADM

## 2022-07-27 RX ORDER — OXYCODONE HYDROCHLORIDE 5 MG/1
5 TABLET ORAL
Status: DISCONTINUED | OUTPATIENT
Start: 2022-07-27 | End: 2022-08-02 | Stop reason: HOSPADM

## 2022-07-27 RX ORDER — GLYCOPYRROLATE 0.2 MG/ML
INJECTION INTRAMUSCULAR; INTRAVENOUS
Status: DISPENSED
Start: 2022-07-27 | End: 2022-07-28

## 2022-07-27 RX ORDER — POLYETHYLENE GLYCOL 3350 17 G/17G
17 POWDER, FOR SOLUTION ORAL DAILY
Status: DISCONTINUED | OUTPATIENT
Start: 2022-07-28 | End: 2022-08-02 | Stop reason: HOSPADM

## 2022-07-27 RX ADMIN — ACETAMINOPHEN 1000 MG: 500 TABLET ORAL at 11:24

## 2022-07-27 RX ADMIN — FAMOTIDINE 20 MG: 20 TABLET, FILM COATED ORAL at 19:19

## 2022-07-27 RX ADMIN — SODIUM CHLORIDE 75 ML/HR: 9 INJECTION, SOLUTION INTRAVENOUS at 15:50

## 2022-07-27 RX ADMIN — ONDANSETRON HYDROCHLORIDE 4 MG: 2 INJECTION, SOLUTION INTRAMUSCULAR; INTRAVENOUS at 14:34

## 2022-07-27 RX ADMIN — SODIUM CHLORIDE, SODIUM LACTATE, POTASSIUM CHLORIDE, AND CALCIUM CHLORIDE: 600; 310; 30; 20 INJECTION, SOLUTION INTRAVENOUS at 13:13

## 2022-07-27 RX ADMIN — Medication 60 MCG/MIN: at 13:33

## 2022-07-27 RX ADMIN — Medication 80 MCG: at 13:32

## 2022-07-27 RX ADMIN — FLECAINIDE ACETATE 50 MG: 50 TABLET ORAL at 21:41

## 2022-07-27 RX ADMIN — CEFAZOLIN 2 G: 1 INJECTION, POWDER, FOR SOLUTION INTRAMUSCULAR; INTRAVENOUS at 21:42

## 2022-07-27 RX ADMIN — ACETAMINOPHEN 650 MG: 325 TABLET ORAL at 19:19

## 2022-07-27 RX ADMIN — PROPOFOL 20 MG: 10 INJECTION, EMULSION INTRAVENOUS at 13:18

## 2022-07-27 RX ADMIN — FENTANYL CITRATE 25 MCG: 50 INJECTION, SOLUTION INTRAMUSCULAR; INTRAVENOUS at 15:46

## 2022-07-27 RX ADMIN — LIDOCAINE HYDROCHLORIDE 3 ML: 10 INJECTION, SOLUTION EPIDURAL; INFILTRATION; INTRACAUDAL; PERINEURAL at 13:20

## 2022-07-27 RX ADMIN — HYDROMORPHONE HYDROCHLORIDE 0.5 MG: 1 INJECTION, SOLUTION INTRAMUSCULAR; INTRAVENOUS; SUBCUTANEOUS at 16:38

## 2022-07-27 RX ADMIN — WATER 2 G: 1 INJECTION INTRAMUSCULAR; INTRAVENOUS; SUBCUTANEOUS at 13:16

## 2022-07-27 RX ADMIN — GLYCOPYRROLATE 0.2 MG: 0.2 INJECTION INTRAMUSCULAR; INTRAVENOUS at 16:05

## 2022-07-27 RX ADMIN — Medication 80 MCG: at 13:34

## 2022-07-27 RX ADMIN — DOCUSATE SODIUM 50MG AND SENNOSIDES 8.6MG 1 TABLET: 8.6; 5 TABLET, FILM COATED ORAL at 19:19

## 2022-07-27 RX ADMIN — PROPOFOL 75 MCG/KG/MIN: 10 INJECTION, EMULSION INTRAVENOUS at 13:25

## 2022-07-27 RX ADMIN — TRANEXAMIC ACID 1 G: 100 INJECTION, SOLUTION INTRAVENOUS at 13:52

## 2022-07-27 RX ADMIN — FENTANYL CITRATE 25 MCG: 50 INJECTION, SOLUTION INTRAMUSCULAR; INTRAVENOUS at 15:55

## 2022-07-27 RX ADMIN — Medication 80 MCG: at 13:35

## 2022-07-27 RX ADMIN — PREGABALIN 75 MG: 75 CAPSULE ORAL at 11:24

## 2022-07-27 RX ADMIN — BUPIVACAINE HYDROCHLORIDE 12 MG: 7.5 INJECTION, SOLUTION EPIDURAL; RETROBULBAR at 13:25

## 2022-07-27 NOTE — PROGRESS NOTES
TRANSFER - OUT REPORT:    Verbal report given to Yuridia Carpio RN(name) on Hi Mccallum  being transferred to (unit) for routine post - op       Report consisted of patients Situation, Background, Assessment and   Recommendations(SBAR). Information from the following report(s) OR Summary, Procedure Summary, Intake/Output, MAR, and Cardiac Rhythm Sinus Allen Larios  was reviewed with the receiving nurse. Lines:   Peripheral IV 07/27/22 Anterior; Left Forearm (Active)   Site Assessment Clean, dry, & intact 07/27/22 1507   Phlebitis Assessment 0 07/27/22 1507   Infiltration Assessment 0 07/27/22 1507   Dressing Status New 07/27/22 1507   Dressing Type Tape;Transparent 07/27/22 1507   Hub Color/Line Status Pink; Infusing 07/27/22 1507   Action Taken Armboard; Open ports on tubing capped 07/27/22 1507   Alcohol Cap Used Yes 07/27/22 1507        Opportunity for questions and clarification was provided.       Patient transported with:   [employer]

## 2022-07-27 NOTE — H&P
H and P    Subjective:         Sudeep Khan is a 80 y.o. female who presents [facility] after failure conservative mgmt    Patient Active Problem List    Diagnosis Date Noted    Osteoarthritis of right hip, unspecified osteoarthritis type 07/27/2022     Family History   Problem Relation Age of Onset    Cancer Mother     Diabetes Sister     Cancer Sister     Hypertension Son     Diabetes Son     Cancer Son     Headache Daughter     Anesth Problems Neg Hx       Social History     Tobacco Use    Smoking status: Never    Smokeless tobacco: Never   Substance Use Topics    Alcohol use: Not Currently     Past Medical History:   Diagnosis Date    A-fib [address]     Arthritis     Cancer ([facility]     STOMACH    Hypertension     PUD (peptic ulcer disease)       Past Surgical History:   Procedure Laterality Date    HX HEENT Right 2013    RESTORED VISION    HX HYSTERECTOMY  1971    UT ABDOMEN SURGERY PROC UNLISTED  2016    REMOVED CANCER FROM STOMACH      Prior to Admission medications    Medication Sig Start Date End Date Taking? Authorizing Provider   pravastatin (PRAVACHOL) 40 mg tablet  4/28/22  Yes Provider, Historical   potassium chloride SA (MICRO-K) 10 mEq capsule  4/28/22  Yes Provider, Historical   losartan (COZAAR) 50 mg tablet  4/11/22  Yes Provider, Historical   flecainide (TAMBOCOR) 50 mg tablet Take 50 mg by mouth every twelve (12) hours. 5/16/22  Yes Provider, Historical   timolol (TIMOPTIC) 0.5 % ophthalmic solution INSTILL 1 DROP INTO BOTH EYES TWICE A DAY 4/25/22  Yes Provider, Historical   acetaminophen (TYLENOL) 500 mg tablet Take  by mouth every six (6) hours as needed for Pain. Yes Provider, Historical   furosemide (LASIX) 40 mg tablet Take 20 mg by mouth in the morning.    Yes Provider, Historical   Xarelto 20 mg tab tablet TAKE 1 TABLET BY MOUTH EVERY DAY WITH EVENING MEAL 4/7/22   Provider, Historical     Current Facility-Administered Medications   Medication Dose Route Frequency    ceFAZolin (ANCEF) 2 g in sterile water (preservative free) 20 mL IV syringe  2 g IntraVENous ONCE    lactated Ringers infusion  50 mL/hr IntraVENous CONTINUOUS    sodium chloride (NS) flush 5-40 mL  5-40 mL IntraVENous Q8H    sodium chloride (NS) flush 5-40 mL  5-40 mL IntraVENous PRN    lidocaine (PF) (XYLOCAINE) 10 mg/mL (1 %) injection 0.1 mL  0.1 mL SubCUTAneous PRN      Allergies   Allergen Reactions    Aleve [Naproxen Sodium] Unknown (comments)        Review of Systems:    Negative for fevers, chills, nausea, vomiting, chest pain, shortness of breath, headaches.             Objective:     Patient Vitals for the past 24 hrs:   Temp Pulse Resp BP SpO2   22 1113 98.1 °F (36.7 °C) 61 16 (!) 171/70 98 %       Temp (24hrs), Av.1 °F (36.7 °C), Min:98.1 °F (36.7 °C), Max:98.1 °F (36.7 °C)      Gen: NAD, A&Ox3  Resp: Non-labored breathing  CV: Extremities well perfused  Abd: soft, NT  RLE: pain with ROM, pos stinchfield, no swelling about knee or ankle, skin intact, warm well perfused, SILT in al distributions L3-S1, palpable pedal pulses, no calf tenderness    Imaging Review: End stage right hip OA    Labs:   Recent Results (from the past 24 hour(s))   GLUCOSE, POC    Collection Time: 22 11:46 AM   Result Value Ref Range    Glucose (POC) 89 65 - 117 mg/dL    Performed by Syed Rodas          Current Facility-Administered Medications   Medication Dose Route Frequency    ceFAZolin (ANCEF) 2 g in sterile water (preservative free) 20 mL IV syringe  2 g IntraVENous ONCE    lactated Ringers infusion  50 mL/hr IntraVENous CONTINUOUS    sodium chloride (NS) flush 5-40 mL  5-40 mL IntraVENous Q8H    sodium chloride (NS) flush 5-40 mL  5-40 mL IntraVENous PRN    lidocaine (PF) (XYLOCAINE) 10 mg/mL (1 %) injection 0.1 mL  0.1 mL SubCUTAneous PRN         Impression:     Active Problems:    Osteoarthritis of right hip, unspecified osteoarthritis type (2022)    81 yo female with end stage right hip OA with failure conservative mgmt    Risks and benefits of joint arthroplasty discussed at length including but not limited to bleeding, need for blood transfusion, infection, damage to surrounding structures, intra-operative fracture, blood clots, pulmonary embolism, death. The patient understands the risks of surgery. All questions answered. They elected to move forward.      Plan:   Plan for Luana Cody MD

## 2022-07-27 NOTE — ANESTHESIA POSTPROCEDURE EVALUATION
Post-Anesthesia Evaluation and Assessment    Patient: Blanca Mena MRN: [id number]  SSN: [id number]    YOB: 1930  Age: 80 y.o. Sex: female      I have evaluated the patient and they are stable and ready for discharge from the PACU. Cardiovascular Function/Vital Signs  Visit Vitals  BP (!) 113/53   Pulse (!) 56   Temp 36.4 °C (97.6 °F)   Resp 13   Ht 4' 11\" (1.499 m)   Wt 69.4 kg (153 lb)   SpO2 100%   BMI 30.90 kg/m²       Patient is status post Spinal anesthesia for Procedure(s):  RIGHT TOTAL HIP ARTHROPLASTY ANTERIOR APPROACH. Nausea/Vomiting: None    Postoperative hydration reviewed and adequate. Pain:  Pain Scale 1: Numeric (0 - 10) (07/27/22 1555)  Pain Intensity 1: 4 (07/27/22 1555)   Managed    Neurological Status:   Neuro (WDL): Within Defined Limits (07/27/22 1528)  Neuro  Neurologic State: Alert (07/27/22 1528)  Orientation Level: Oriented X4 (07/27/22 1528)  Cognition: Follows commands (07/27/22 1528)  Speech: Clear (07/27/22 1528)  Assessment L Pupil: Brisk;Round (07/27/22 1528)  Size L Pupil (mm): 3 (07/27/22 1528)  Assessment R Pupil: Brisk;Round (07/27/22 1528)  Size R Pupil (mm): 3 (07/27/22 1528)  LUE Motor Response: Purposeful (07/27/22 1528)  LLE Motor Response: Weak (Post spinal) (07/27/22 1528)  RUE Motor Response: Purposeful (07/27/22 1528)  RLE Motor Response: Weak (Post spinal) (07/27/22 1528)   At baseline    Mental Status, Level of Consciousness: Alert and  oriented to person, place, and time    Pulmonary Status:   O2 Device: None (Room air) (07/27/22 1528)   Adequate oxygenation and airway patent    Complications related to anesthesia: None    Post-anesthesia assessment completed.  No concerns    Signed By: Maritza Sequeira MD     July 27, 2022              Procedure(s):  RIGHT TOTAL HIP ARTHROPLASTY ANTERIOR APPROACH.    spinal    <BSHSIANPOST>    INITIAL Post-op Vital signs:   Vitals Value Taken Time   /69 07/27/22 1615   Temp 36.4 °C (97.6 °F) 07/27/22 1528   Pulse 57 07/27/22 1616   Resp 11 07/27/22 1616   SpO2 99 % 07/27/22 1614   Vitals shown include unvalidated device data.

## 2022-07-27 NOTE — ANESTHESIA PROCEDURE NOTES
Spinal Block    Start time: 7/27/2022 1:20 PM  End time: 7/27/2022 1:25 PM  Performed by: Bernice Warren MD  Authorized by: Bernice Warren MD     Pre-procedure:   Indications: at surgeon's request and primary anesthetic  Preanesthetic Checklist: patient identified, risks and benefits discussed, anesthesia consent, site marked, patient being monitored, timeout performed and fire risk safety assessment completed and verbalized      Spinal Block:   Patient Position:  Seated  Prep Region:  Lumbar  Prep: Betadine      Location:  L1-2    Local: lidocaine (PF) (XYLOCAINE) 10 mg/mL (1 %) injection 0.1 mL - SubCUTAneous, Back   3 mL - 7/27/2022 1:20:00 PM  bupivacaine (PF) (MARCAINE) 0.75 % (7.5 mg/mL) Intrathecal - Intrathecal, Back   12 mg - 7/27/2022 1:25:00 PM    Med Admin Time: 7/27/2022 3:25 AM    Needle:   Needle Type:  Pencan  Needle Gauge:  24 G  Attempts:  2      Events: CSF confirmed, no blood with aspiration and no paresthesia        Assessment:  Insertion:  Uncomplicated  Patient tolerance:  Patient tolerated the procedure well with no immediate complications

## 2022-07-27 NOTE — H&P
Date of Surgery Update:  Azul Ferrari was seen and examined. History and physical has been reviewed. The patient has been examined. There have been no significant clinical changes since the completion of the originally dated History and Physical.  Patient identified by surgeon; surgical site was confirmed by patient and surgeon.     Signed By: Delores Galdamez MD     July 27, 2022 11:20 AM

## 2022-07-27 NOTE — ANESTHESIA PREPROCEDURE EVALUATION
Relevant Problems   No relevant active problems       Anesthetic History   No history of anesthetic complications            Review of Systems / Medical History  Patient summary reviewed, nursing notes reviewed and pertinent labs reviewed    Pulmonary  Within defined limits                 Neuro/Psych   Within defined limits           Cardiovascular    Hypertension: well controlled        Dysrhythmias       Exercise tolerance: >4 METS     GI/Hepatic/Renal           PUD     Endo/Other        Arthritis     Other Findings              Physical Exam    Airway  Mallampati: I  TM Distance: > 6 cm  Neck ROM: normal range of motion   Mouth opening: Normal     Cardiovascular    Rhythm: regular           Dental    Dentition: Edentulous     Pulmonary  Breath sounds clear to auscultation               Abdominal         Other Findings            Anesthetic Plan    ASA: 3  Anesthesia type: spinal          Induction: Intravenous  Anesthetic plan and risks discussed with: Patient

## 2022-07-27 NOTE — OP NOTES
OPERATIVE REPORT  RIGHT TOTAL HIP REPLACEMENT (ANTERIOR APPROACH)    NAME: Brooke Snyder  MRN: [id number]  :  3/21/1930  AGE: 80 y.o. DATE OF SURGERY:  2022    PREOPERATIVE DIAGNOSIS: Severe degenerative joint disease, right hip. POSTOPERATIVE DIAGNOSIS: Severe degenerative joint disease, right hip. PROCEDURES PERFORMED: Right total hip replacement - Anterior approach    SURGEON: Kim Borges MD.    ASSISTANT: Jj Queen PA-C    ANESTHESIA: epidural/spinal anesthesia    ESTIMATED BLOOD LOSS: 250 mL. DRAINS: None. COMPLICATIONS: None. SPECIMENS REMOVED: None. PRE-OP ANTIBIOTIC: Ancef 2 gram    IMPLANT:   Implant Name Type Inv. Item Serial No.  Lot No. LRB No. Used Action   SCR ACET CANC PINN 6.5X25MM SS --  - SNA  SCR ACET CANC PINN 6.5X25MM SS --  NA [employer] [facility] [id number] Right 1 Implanted   SCREW BNE L35MM DIA6.5MM CANC HIP DOME PINN - SNA  SCREW BNE L35MM DIA6.5MM CANC HIP DOME PINN NA [employer] [facility] [id number] Right 1 Implanted   LINER ACET PINN NEUT [id number] -- ALTRX - SNA  LINER ACET PINN NEUT 32X48 -- ALTRX NA [employer] [id number] Right 1 Implanted   CUP ACET SECTOR GRIPTION 48MM -- TI - SNA  CUP ACET SECTOR GRIPTION 48MM -- TI NA [employer] [id number] Right 1 Implanted   STEM FEM SZ 5 HIP STD OFFSET CLLRD CEMENTLESS 12/14 TAPR - SNA  STEM FEM SZ 5 HIP STD OFFSET CLLRD CEMENTLESS 12/14 TAPR NA [employer] [facility] [id number] Right 1 Implanted   HEAD FEM [id number] +1MM OFFSET 12/14 TAPR HIP CERAMIC BIOLOX - SNA  HEAD FEM [id number] +1MM OFFSET 12/14 TAPR HIP CERAMIC BIOLOX NA [employer] [id number] Right 1 Implanted       INDICATIONS: The patient is an 80 yrs female with progressive debilitating right hip and groin pain due to progressive osteoarthritis. Symptoms have progressed despite comprehensive conservative treatment and they present for right total hip replacement.   Risks include bleeding, infection, damage to the LFCN, leg length descrepency, blood clots, pulmonary embolism, and death. The patient understood these risks as well as potential benefits and alternatives and elected to proceed. DESCRIPTION OF PROCEDURE: Anesthetic was initiated. Preoperative dose of intravenous antibiotic was given within 30 minutes of incision. One gram of tranexamic acid was also administered intravenously. 2 grams of tranexamic acid with 0.4mL of epi topically at the end of the case. The right side was confirmed during a timeout and the hip was prepped and draped in the usual sterile fashion. Skin was covered with Ioban occlusive dressing. A pre-operative xray was taken with the hip in 10 degrees of internal rotation which was utilized for the [address]. The patient underwent straight catheterization at the end of the case. Direct anterior exposure was made to the patient's hip through the sartorius-tensor interval well lateral of the ASIS to protect the LFCN. Anterior hip vasculature including the ascending branches of the lateral circumflex artery were cauterized. Retractors were taken out to observe for bleeding and there was none. A T capsulotomy was made with bovie electrocautery. The Charnley retractor was repositioned for exposure. The femoral neck was osteotomized. Femoral head was removed from the acetabulum, which was exposed and soft tissues were removed. The acetabulum was progressively  reamedand a 50 mm trial shell was impacted with good press-fit. This was removed and a 50 mm Brunson Gription shell was impacted in the acetabulum in 40 degrees of abduction in an anatomic-type anteversion. Bone spurs were removed and 6.5 screws x2 were placed. The polyethylene liner was placed. Femur was positioned and elevated from the wound. Appropriate soft tissue releases were made including the posterior capsule and obturator internus.  The medullary canal was entered with a box osteotome. The femoral canal was broached to a size 5. Calcar planed and then trialed. A 32 mm , +1 hip ball was the most appropriate for leg length and tension with offset to best match native offset. I then utilized the [facility] software to confirm offset and leg length changes compared to our pre-operative digital template and the intra-operative image that was obtained. The hip was dislocated. The trial was removed and the real stem was impacted. The real hip ball was placed. The hip was reduced. After copious irrigation, the capsule was closed with #1 Stratafix barbed sutures. I irrigated the skin, subcutaneous and deep wound. I closed the fascia of the tensor fascia layla with #1 stratafix barbed sutures. Skin and subcutaneous were irrigated. Soft tissues were infiltrated with local anesthetic. 2 grams of tranexamic acid with 0.4 mL of epi given topically in the wound. The pulse lavage was used to irrigate thoroughly. Skin and subcutaneous were closed in a standard fashion with 2-0 vicryl and 3-0 monocryl followed by Dermabond. An aquacel dressing was applied. Gely Holden was critical for guerra portions of the case including retractor management, wound closure, and dressing application. There was no one else available to perform these specific duties. There were no complications. No specimen was sent. The procedure was a RIGHT TOTAL HIP REPLACEMENT using a Depuy total hip construct. The patient was transferred to the recovery room in stable condition. An AP pelvis xray was ordered to be completed in the PACU.      Minesh Harmon MD

## 2022-07-28 LAB
ANION GAP SERPL CALC-SCNC: 5 MMOL/L (ref 5–15)
BUN SERPL-MCNC: 12 MG/DL (ref 6–20)
BUN/CREAT SERPL: 18 (ref 12–20)
CALCIUM SERPL-MCNC: 8.6 MG/DL (ref 8.5–10.1)
CHLORIDE SERPL-SCNC: 104 MMOL/L (ref 97–108)
CO2 SERPL-SCNC: 27 MMOL/L (ref 21–32)
CREAT SERPL-MCNC: 0.67 MG/DL (ref 0.55–1.02)
GLUCOSE SERPL-MCNC: 114 MG/DL (ref 65–100)
HGB BLD-MCNC: 11.4 G/DL (ref 11.5–16)
POTASSIUM SERPL-SCNC: 5 MMOL/L (ref 3.5–5.1)
SODIUM SERPL-SCNC: 136 MMOL/L (ref 136–145)

## 2022-07-28 PROCEDURE — 74011250637 HC RX REV CODE- 250/637: Performed by: PHYSICIAN ASSISTANT

## 2022-07-28 PROCEDURE — 85018 HEMOGLOBIN: CPT

## 2022-07-28 PROCEDURE — 74011000250 HC RX REV CODE- 250: Performed by: PHYSICIAN ASSISTANT

## 2022-07-28 PROCEDURE — 80048 BASIC METABOLIC PNL TOTAL CA: CPT

## 2022-07-28 PROCEDURE — 74011250637 HC RX REV CODE- 250/637

## 2022-07-28 PROCEDURE — 97161 PT EVAL LOW COMPLEX 20 MIN: CPT

## 2022-07-28 PROCEDURE — 74011000250 HC RX REV CODE- 250

## 2022-07-28 PROCEDURE — 36415 COLL VENOUS BLD VENIPUNCTURE: CPT

## 2022-07-28 PROCEDURE — 97530 THERAPEUTIC ACTIVITIES: CPT

## 2022-07-28 PROCEDURE — 97535 SELF CARE MNGMENT TRAINING: CPT

## 2022-07-28 PROCEDURE — 97165 OT EVAL LOW COMPLEX 30 MIN: CPT

## 2022-07-28 PROCEDURE — 74011250636 HC RX REV CODE- 250/636: Performed by: PHYSICIAN ASSISTANT

## 2022-07-28 PROCEDURE — 97116 GAIT TRAINING THERAPY: CPT

## 2022-07-28 RX ORDER — AMOXICILLIN 250 MG
1 CAPSULE ORAL 2 TIMES DAILY
Qty: 60 TABLET | Refills: 0 | Status: SHIPPED | OUTPATIENT
Start: 2022-07-28 | End: 2022-08-27

## 2022-07-28 RX ORDER — TIMOLOL MALEATE 5 MG/ML
1 SOLUTION/ DROPS OPHTHALMIC 2 TIMES DAILY
Status: DISCONTINUED | OUTPATIENT
Start: 2022-07-28 | End: 2022-08-02 | Stop reason: HOSPADM

## 2022-07-28 RX ORDER — ACETAMINOPHEN 325 MG/1
650 TABLET ORAL EVERY 6 HOURS
Qty: 100 TABLET | Refills: 0 | Status: SHIPPED | OUTPATIENT
Start: 2022-07-28

## 2022-07-28 RX ORDER — TRAMADOL HYDROCHLORIDE 50 MG/1
50 TABLET ORAL
Qty: 42 TABLET | Refills: 0 | Status: SHIPPED | OUTPATIENT
Start: 2022-07-28 | End: 2022-08-04

## 2022-07-28 RX ADMIN — ACETAMINOPHEN 650 MG: 325 TABLET ORAL at 05:22

## 2022-07-28 RX ADMIN — PRAVASTATIN SODIUM 40 MG: 40 TABLET ORAL at 09:45

## 2022-07-28 RX ADMIN — FLECAINIDE ACETATE 50 MG: 50 TABLET ORAL at 09:45

## 2022-07-28 RX ADMIN — SODIUM CHLORIDE, PRESERVATIVE FREE 10 ML: 5 INJECTION INTRAVENOUS at 18:03

## 2022-07-28 RX ADMIN — RIVAROXABAN 20 MG: 20 TABLET, FILM COATED ORAL at 18:01

## 2022-07-28 RX ADMIN — TIMOLOL MALEATE 1 DROP: 5 SOLUTION OPHTHALMIC at 18:01

## 2022-07-28 RX ADMIN — POLYETHYLENE GLYCOL 3350 17 G: 17 POWDER, FOR SOLUTION ORAL at 09:45

## 2022-07-28 RX ADMIN — CEFAZOLIN 2 G: 1 INJECTION, POWDER, FOR SOLUTION INTRAMUSCULAR; INTRAVENOUS at 05:22

## 2022-07-28 RX ADMIN — POTASSIUM CHLORIDE 10 MEQ: 750 TABLET, FILM COATED, EXTENDED RELEASE ORAL at 09:45

## 2022-07-28 RX ADMIN — LOSARTAN POTASSIUM 50 MG: 50 TABLET, FILM COATED ORAL at 09:45

## 2022-07-28 RX ADMIN — DOCUSATE SODIUM 50MG AND SENNOSIDES 8.6MG 1 TABLET: 8.6; 5 TABLET, FILM COATED ORAL at 18:01

## 2022-07-28 RX ADMIN — TIMOLOL MALEATE 1 DROP: 5 SOLUTION OPHTHALMIC at 14:01

## 2022-07-28 RX ADMIN — ACETAMINOPHEN 650 MG: 325 TABLET ORAL at 13:59

## 2022-07-28 RX ADMIN — ACETAMINOPHEN 650 MG: 325 TABLET ORAL at 00:44

## 2022-07-28 RX ADMIN — SODIUM CHLORIDE, PRESERVATIVE FREE 10 ML: 5 INJECTION INTRAVENOUS at 21:01

## 2022-07-28 RX ADMIN — FLECAINIDE ACETATE 50 MG: 50 TABLET ORAL at 20:58

## 2022-07-28 RX ADMIN — ACETAMINOPHEN 650 MG: 325 TABLET ORAL at 18:01

## 2022-07-28 RX ADMIN — FAMOTIDINE 20 MG: 20 TABLET, FILM COATED ORAL at 09:45

## 2022-07-28 RX ADMIN — DOCUSATE SODIUM 50MG AND SENNOSIDES 8.6MG 1 TABLET: 8.6; 5 TABLET, FILM COATED ORAL at 09:45

## 2022-07-28 RX ADMIN — FAMOTIDINE 20 MG: 20 TABLET, FILM COATED ORAL at 18:01

## 2022-07-28 RX ADMIN — SODIUM CHLORIDE 75 ML/HR: 9 INJECTION, SOLUTION INTRAVENOUS at 05:20

## 2022-07-28 RX ADMIN — FUROSEMIDE 20 MG: 20 TABLET ORAL at 09:45

## 2022-07-28 NOTE — PROGRESS NOTES
Medicare Outpatient Observation Notice (MOON) provided to patient/representative with verbal explanation of the notice. Time allotted for questions regarding the notice. Patient /representative provided a completed copy of the MOON notice. Copy placed on bedside chart.   Gris Restrepo, Care Management Assistant

## 2022-07-28 NOTE — PROGRESS NOTES
Transition Of Care: The patient plans to go to SNF with BLS to transport when stable for discharge. Insurance authorization will be required for admission. Pending referrals in sent to:    [facility] choice: [facility]  2nd choice: [address] [place] [address]  3rd choice: [facility]    RUR: N/A    The patient is from home and lives with her daughter. Orthpedics, PT/OT following. The patient plans to discharge to SNF. Care Management Interventions  PCP Verified by CM: Yes  Palliative Care Criteria Met (RRAT>21 & CHF Dx)?: No  Mode of Transport at Discharge: Other (see comment)  Transition of Care Consult (CM Consult): SNF  MyChart Signup: No  Discharge Durable Medical Equipment: No  Health Maintenance Reviewed: Yes  Physical Therapy Consult: Yes  Occupational Therapy Consult: Yes  Speech Therapy Consult: No  Support Systems: Child(zandra)  Confirm Follow Up Transport: Family  The Plan for Transition of Care is Related to the Following Treatment Goals : The patient plans to discharge to SNF.   The Patient and/or Patient Representative was Provided with a Choice of Provider and Agrees with the Discharge Plan?: Yes  Freedom of Choice List was Provided with Basic Dialogue that Supports the Patient's Individualized Plan of Care/Goals, Treatment Preferences and Shares the Quality Data Associated with the Providers?: Yes  [place] Resource Information Provided?: No  Discharge Location  Patient Expects to be Discharged to[de-identified] Skilled nursing facility     Reason for Admission: Right Total Hip                      RUR Score:    N/A                 Plan for utilizing home health:  SNF        PCP: First and Last name:  Gurvinder Flores MD     Name of Practice:    Are you a current patient: Yes/No: Y   Approximate date of last visit: June, 2022   Can you participate in a virtual visit with your PCP: Y                    Current Advanced Directive/Advance Care Plan: Full Code      Healthcare Decision Maker:   Click here to complete [address] including selection of the Healthcare Decision Maker Relationship (ie \"Primary\")       Daughter: Ban Ponds: [phone]                   Transition of Care Plan:          CM met with the patient and daughter in room 561. The patient is alert and oriented x4. Confirmed demographics. The patient is independent with ADL's/IADL's, has a rolling walker, cane and uses [facility] pharmacy on [facility]. The patient lives with her daughter in a 2 story private residence with 3 steps to enter a chair lift to a 2nd level bedroom. The patient plans to go to SNF for rehab with BSL transport. SNF choices pending. CM following for discharge needs.     Jerry Strong RN/CRM

## 2022-07-28 NOTE — PROGRESS NOTES
Resting comfortably. GEN:  NAD.  AOx3   ABD:  S/NT/ND   RLE:  Dressing C/D/I , 5/5 motor, Calf nttp (Bilat), Sensation grossly intact to light touch throughout, 1+ dp/pt pulses, foot perfused    Patient Vitals for the past 24 hrs:   Temp Pulse Resp BP SpO2   07/28/22 0322 98.9 °F (37.2 °C) 67 20 (!) 151/65 92 %   07/27/22 2131 97.5 °F (36.4 °C) (!) 55 18 (!) 170/67 97 %   07/27/22 1915 97.4 °F (36.3 °C) (!) 52 18 (!) 168/79 97 %   07/27/22 1830 -- 60 18 (!) 181/80 97 %   07/27/22 1730 -- 92 18 (!) 200/80 95 %   07/27/22 1628 -- (!) 58 11 (!) 156/66 97 %   07/27/22 1613 -- (!) 57 18 (!) 153/69 100 %   07/27/22 1558 -- (!) 50 13 (!) 149/61 98 %   07/27/22 1543 -- (!) 49 11 119/63 100 %   07/27/22 1528 97.6 °F (36.4 °C) (!) 56 13 (!) 113/53 100 %   07/27/22 1523 -- (!) 53 13 103/61 100 %   07/27/22 1518 -- (!) 52 14 (!) 95/56 100 %   07/27/22 1513 -- (!) 58 18 (!) 93/49 100 %   07/27/22 1508 -- (!) 55 12 123/65 100 %   07/27/22 1507 97.2 °F (36.2 °C) (!) 57 14 123/65 99 %   07/27/22 1113 98.1 °F (36.7 °C) 61 16 (!) 171/70 98 %       Current Facility-Administered Medications   Medication Dose Route Frequency    flecainide (TAMBOCOR) tablet 50 mg  50 mg Oral Q12H    furosemide (LASIX) tablet 20 mg  20 mg Oral DAILY    losartan (COZAAR) tablet 50 mg  50 mg Oral DAILY    potassium chloride SR (KLOR-CON 10) tablet 10 mEq  10 mEq Oral DAILY    rivaroxaban (XARELTO) tablet 20 mg  20 mg Oral DAILY    0.9% sodium chloride infusion  75 mL/hr IntraVENous CONTINUOUS    sodium chloride 0.9 % bolus infusion 500 mL  500 mL IntraVENous ONCE PRN    sodium chloride (NS) flush 5-40 mL  5-40 mL IntraVENous Q8H    sodium chloride (NS) flush 5-40 mL  5-40 mL IntraVENous PRN    acetaminophen (TYLENOL) tablet 650 mg  650 mg Oral Q6H    oxyCODONE IR (ROXICODONE) tablet 5 mg  5 mg Oral Q3H PRN    HYDROmorphone (DILAUDID) injection 0.5 mg  0.5 mg IntraVENous Q4H PRN    naloxone (NARCAN) injection 0.4 mg  0.4 mg IntraVENous PRN    ondansetron [facility]) injection 4 mg  4 mg IntraVENous Q4H PRN    famotidine (PEPCID) tablet 20 mg  20 mg Oral BID    senna-docusate (PERICOLACE) 8.6-50 mg per tablet 1 Tablet  1 Tablet Oral BID    polyethylene glycol (MIRALAX) packet 17 g  17 g Oral DAILY    [START ON 7/29/2022] bisacodyL (DULCOLAX) suppository 10 mg  10 mg Rectal DAILY PRN    traMADoL (ULTRAM) tablet 50 mg  50 mg Oral Q6H PRN    pravastatin (PRAVACHOL) tablet 40 mg  40 mg Oral DAILY       Lab Results   Component Value Date/Time    HGB 11.4 (L) 07/28/2022 05:17 AM    INR 1.1 07/21/2022 12:12 PM       Lab Results   Component Value Date/Time    Sodium 136 07/28/2022 05:17 AM    Potassium 5.0 07/28/2022 05:17 AM    Chloride 104 07/28/2022 05:17 AM    CO2 27 07/28/2022 05:17 AM    BUN 12 07/28/2022 05:17 AM    Creatinine 0.67 07/28/2022 05:17 AM    Calcium 8.6 07/28/2022 05:17 AM            80 y.o. female s/p right direct anterior total hip arthroplasty on 7/27/2022  . Doing well.      Precautions: None  ABX: Complete 24 hours perioperative abx  PATHWAY: Straight cath per protocol  DVT Prophylaxis: home Xarelto  Weight Bearing: WBAT RLE   Pain Control: Tylenol, tramadol every 6 hours, oxy 5 mg for breakthrough pain  Disposition: Home with HHPT va SNF vs Rehab pending PT recommendations

## 2022-07-28 NOTE — PROGRESS NOTES
Ortho NP Note    POD# 1  s/p RIGHT TOTAL HIP ARTHROPLASTY ANTERIOR APPROACH   Pt seen in room    Pt resting in bed. Reports pain remains tolerable, currently rating 2/10. States this is an improvement over chronic hip pain present in preoperative period. Patient feels tylenol has been sufficient for pain control. Patient feels that she will need placement for rehab following acute hospitalization as she lives at home with her two daughters who both have medical concerns of their own. Feels she will need assistance with meals, ongoing rehab, and strengthening prior to returning to private residence. VSS Afebrile. Visit Vitals  BP (!) 151/65   Pulse 67   Temp 98.9 °F (37.2 °C)   Resp 20   Ht 4' 11\" (1.499 m)   Wt 69.4 kg (153 lb)   SpO2 92%   BMI 30.90 kg/m²       Voiding status: spontaneous void, pure wick in place  Output (mL)  Last Bowel Movement Date: 07/26/22 (07/28/22 0900)      Labs    Lab Results   Component Value Date/Time    HGB 11.4 (L) 07/28/2022 05:17 AM      Lab Results   Component Value Date/Time    INR 1.1 07/21/2022 12:12 PM      Lab Results   Component Value Date/Time    Sodium 136 07/28/2022 05:17 AM    Potassium 5.0 07/28/2022 05:17 AM    Chloride 104 07/28/2022 05:17 AM    CO2 27 07/28/2022 05:17 AM    Glucose 114 (H) 07/28/2022 05:17 AM    BUN 12 07/28/2022 05:17 AM    Creatinine 0.67 07/28/2022 05:17 AM    Calcium 8.6 07/28/2022 05:17 AM     Recent Glucose Results:   Lab Results   Component Value Date/Time     (H) 07/28/2022 05:17 AM    GLUCPOC 89 07/27/2022 11:46 AM        ASSESSMENT/PLAN:  I have reviewed progress note and am in agreement with assessment and plan as documented by TAMIR Costello. Interval updates as follows     1) PT: BID WBAT in hospital.  Requesting placement--CM involved. Spoke with daughter via telephone regarding this and family in agreement. 2) Anticoagulation: Xarelto for DVT Prophylaxis. Encouraged ongoing mobilization, bed exercises.    3) Pain - Multimodal approach including cryotherapy, scheduled tylenol with PRN tramadol, oxycodone  4) Post op care: Continue OBR, encouraged IS. Follow up in 3-4 weeks with Dr Nigel Espinal to remain in place x 7 days unless integrity is lost.   5) Post operative anemia: PAT Hgb on 7/21: 12.6. EBL: 250 mL. Hgb on POD 1: 11.4. No active bleeding noted, Expected Acute blood loss post-op anemia  6) Hx of A fib: Continue flecainide, Xarelto per above   7) Readniess for discharge:      [] Vital Signs stable    [] Hgb stable    [] + Voiding    [] Wound intact, drainage minimal    [] Tolerating PO intake     [] Cleared by PT (OT if applicable)     [] Stair training completed (if applicable)    [] Independent / Contact Guard Assist (household distance)     [] Bed mobility     [] Car transfers     [] ADLs    [] Adequate pain control on oral medication alone     POD 1 but with request for placement (likely SNF) d/t limited support at home, age, 2 story living.   Luz Loomis NP  Available via [employer]

## 2022-07-28 NOTE — PROGRESS NOTES
Bedside and Verbal shift change report given to Pardeep Lujan (oncoming nurse) by Priya Moyer RN (offgoing nurse). Report included the following information SBAR, Kardex, Intake/Output, MAR and Recent Results.

## 2022-07-28 NOTE — PROGRESS NOTES
Problem: Mobility Impaired (Adult and Pediatric)  Goal: *Acute Goals and Plan of Care (Insert Text)  Description: FUNCTIONAL STATUS PRIOR TO ADMISSION: Patient was modified independent using a rolling walker and single point cane for functional mobility. HOME SUPPORT PRIOR TO ADMISSION: The patient lived with daughter but did not require assist and daughter is disabled and is unable to assist her. Physical Therapy Goals  Initiated 7/28/2022    1. Patient will move from supine to sit and sit to supine , scoot up and down, and roll side to side in bed with minimal assistance/contact guard assist within 4 days. 2. Patient will perform sit to stand with minimal assistance/contact guard assist within 4 days. 3. Patient will ambulate with minimal assistance/contact guard assist for 50 feet with the least restrictive device within 4 days. 4. Patient will ascend/descend 4 stairs with cane and one handrail(s) with minimal assistance/contact guard assist within 4 days. 5. Patient will perform post-SHANON home exercise program per protocol with independence within 4 days. Outcome: Progressing Towards Goal   PHYSICAL THERAPY EVALUATION  Patient: Coy Pallas (24 y.o. female)  Date: 7/28/2022  Primary Diagnosis: Primary osteoarthritis of right hip [M16.11]  Osteoarthritis of right hip, unspecified osteoarthritis type [M16.11]  Procedure(s) (LRB):  RIGHT TOTAL HIP ARTHROPLASTY ANTERIOR APPROACH (Right) 1 Day Post-Op   Precautions:   Fall, WBAT    ASSESSMENT  Based on the objective data described below, the patient presents with  impairment in functional mobility, activity tolerance and balance s/p R SHANON. PLOF: Modified independent with ADLs and IADLs with RW: dressing/bathing/cooking/laundry. Patient's adult daughter lives with her but is disabled and cannot assist her. Patient performed bed mobility, transfers and 5 ft of gait with RW and gait belt. Left up in recliner at bed side.  Will see agin this pm and progress. As patient has no assistance in the home and has very high PLOF, she would benefit from rehab in a SNF setting upon discharge. Current Level of Function Impacting Discharge (mobility/balance): Maximal assistance for bed mobility, moderate-minimal for transfers and gait. Functional Outcome Measure: The patient scored  on the 45/100 outcome measure which is indicative of moderate amimpaired ability to care for basic self-needs/dependency on others. .      Other factors to consider for discharge: Motivated/A & O x 4/Supportive Family/Independent PLOF      Patient will benefit from skilled therapy intervention to address the above noted impairments. PLAN :  Recommendations and Planned Interventions: bed mobility training, transfer training, gait training, therapeutic exercises, patient and family training/education, and therapeutic activities      Frequency/Duration: Patient will be followed by physical therapy:  twice daily to address goals. Recommendation for discharge: (in order for the patient to meet his/her long term goals)  Therapy up to 5 days/week in SNF setting    This discharge recommendation:  Has been made in collaboration with the attending provider and/or case management    IF patient discharges home will need the following DME: patient owns DME required for discharge         SUBJECTIVE:   Patient stated I want to get better so that I can go back home.     OBJECTIVE DATA SUMMARY:   HISTORY:    Past Medical History:   Diagnosis Date    A-fib ([facility] [address]     Arthritis     Cancer ([facility] [address]     STOMACH    Hypertension     PUD (peptic ulcer disease)      Past Surgical History:   Procedure Laterality Date    HX HEENT Right 2013    RESTORED VISION    HX HYSTERECTOMY  1971    CO ABDOMEN SURGERY PROC UNLISTED  2016    REMOVED CANCER FROM STOMACH       Personal factors and/or comorbidities impacting plan of care:  Motivated/A & O x 4/Supportive Family/Independent PLOF     Home Situation  Home Environment: Private residence  # Steps to Enter: 2  Rails to Enter: Yes  Hand Rails : Bilateral  Wheelchair Ramp: No  One/Two Story Residence: Two story  Lift Chair Available: Yes  Living Alone: No  Support Systems: Child(zandra)  Patient Expects to be Discharged to[de-identified] Skilled nursing facility  Current DME Used/Available at Home: Shower chair, Grab bars  Tub or Shower Type: Tub/Shower combination    EXAMINATION/PRESENTATION/DECISION MAKING:   Critical Behavior:  Neurologic State: Alert  Orientation Level: Oriented X4  Cognition: Appropriate decision making, Appropriate for age attention/concentration, Appropriate safety awareness, Follows commands  Safety/Judgement: Awareness of environment  Hearing:     Skin:  Aquacel Intact with no drainage appreciated. Edema: AMOSNormal post-op inflammation   Range Of Motion:  AROM: Generally decreased, functional           PROM: Generally decreased, functional           Strength:    Strength: Generally decreased, functional                    Tone & Sensation:   Tone: Normal              Sensation: Intact               Coordination:  Coordination: Within functional limits  Vision:      Functional Mobility:  Bed Mobility:     Supine to Sit: Maximum assistance;Assist x2  Sit to Supine:  (Left up in recliner)  Scooting: Contact guard assistance  Transfers:  Sit to Stand: Moderate assistance;Assist x2  Stand to Sit: Moderate assistance;Assist x2        Bed to Chair: Moderate assistance;Assist x2              Balance:   Sitting: Intact; With support  Standing: Impaired; With support  Standing - Static: Fair;Constant support  Standing - Dynamic : Fair;Constant support  Ambulation/Gait Training:  Distance (ft): 5 Feet (ft)  Assistive Device: Walker, rolling;Gait belt  Ambulation - Level of Assistance: Minimal assistance; Moderate assistance        Gait Abnormalities: Antalgic;Decreased step clearance;Shuffling gait;Trunk sway increased  Right Side Weight Bearing: As tolerated     Base of Support: Widened;Shift to left  Stance: Right decreased  Speed/Felicitas: Shuffled; Slow  Step Length: Right shortened;Left shortened  Swing Pattern: Right asymmetrical (difficulty with right swing)     Interventions: Safety awareness training;Verbal cues                Therapeutic Exercises: Ankle Pumps  Ham Sets  Quad Sets  Glute Sets  Heel Slides  X 10 each, 5-7x daily      Functional Measure:  Barthel Index:    Bathin  Bladder: 5  Bowels: 10  Groomin  Dressin  Feeding: 10  Mobility: 0  Stairs: 0  Toilet Use: 5  Transfer (Bed to Chair and Back): 5  Total: 45/100       The Barthel ADL Index: Guidelines  1. The index should be used as a record of what a patient does, not as a record of what a patient could do. 2. The main aim is to establish degree of independence from any help, physical or verbal, however minor and for whatever reason. 3. The need for supervision renders the patient not independent. 4. A patient's performance should be established using the best available evidence. Asking the patient, friends/relatives and nurses are the usual sources, but direct observation and common sense are also important. However direct testing is not needed. 5. Usually the patient's performance over the preceding 24-48 hours is important, but occasionally longer periods will be relevant. 6. Middle categories imply that the patient supplies over 50 per cent of the effort. 7. Use of aids to be independent is allowed. Score Interpretation (from [address] [address])    Independent   60-79 Minimally independent   40-59 Partially dependent   20-39 Very dependent   <20 Totally dependent     -Paula Webb., Barthel, D.W. (1965). Functional evaluation: the Barthel Index. [address] ([address] (1997). The Barthel activities of daily living index: self-reporting versus actual performance in the old (> or = 75 years).  Journal of [facility]4 [facility] 45(7), [address] [facility], AKOSUA DAMON. Marlen Grullon., Nisha Dye., Hawa Salazar (1999). Measuring the change in disability after inpatient rehabilitation; comparison of the responsiveness of the Barthel Index and Functional [place] Measure. Journal of Neurology, Neurosurgery, and Psychiatry, 66(4), 310-097. YARIEL Castaneda, LINETTE Rodriguez, & Sarah Gaspar M.A. (2004) Assessment of post-stroke quality of life in cost-effectiveness studies: The usefulness of the Barthel Index and the EuroQoL-5D. Quality of Life Research, 15, 808-30           Physical Therapy Evaluation Charge Determination   History Examination Presentation Decision-Making   MEDIUM  Complexity : 1-2 comorbidities / personal factors will impact the outcome/ POC  LOW Complexity : 1-2 Standardized tests and measures addressing body structure, function, activity limitation and / or participation in recreation  LOW Complexity : Stable, uncomplicated  LOW Complexity : FOTO score of       Based on the above components, the patient evaluation is determined to be of the following complexity level: LOW     Pain Ratin/10    Activity Tolerance:   Good    After treatment patient left in no apparent distress:   Sitting in chair, Call bell within reach, Caregiver / family present, and nurse notified. COMMUNICATION/EDUCATION:   The patients plan of care was discussed with: Occupational therapist, Registered nurse, Physician, and Case management. Fall prevention education was provided and the patient/caregiver indicated understanding., Patient/family have participated as able in goal setting and plan of care. , and Patient/family agree to work toward stated goals and plan of care.     Thank you for this referral.  Nikkie Severino   Time Calculation: 30 mins

## 2022-07-28 NOTE — PROGRESS NOTES
Problem: Self Care Deficits Care Plan (Adult)  Goal: *Acute Goals and Plan of Care (Insert Text)  Description: FUNCTIONAL STATUS PRIOR TO ADMISSION: Patient was independent and active without use of DME. Patient was modified independent for basic and instrumental ADLs. HOME SUPPORT: The patient lived with two daughters but did not require assist. Daughters are unable to provide assist at home. Occupational Therapy Goals  Initiated 7/28/2022  1. Patient will perform grooming with modified independence within 4 day(s). 2.  Patient will perform bathing with modified independence within 4 day(s). 3.  Patient will perform upper body dressing and lower body dressing with modified independence within 4 day(s). 4.  Patient will perform toilet transfers with modified independence within 4 day(s). 5.  Patient will perform all aspects of toileting with modified independence within 4 day(s). 6.  Patient will participate in upper extremity therapeutic exercise/activities with modified independence for 10 minutes within 4 day(s). 7.  Patient will utilize energy conservation techniques during functional activities with verbal cues within 4 day(s). Outcome: Progressing Towards Goal     OCCUPATIONAL THERAPY EVALUATION  Patient: Hi Mccallum (17 y.o. female)  Date: 7/28/2022  Primary Diagnosis: Primary osteoarthritis of right hip [M16.11]  Osteoarthritis of right hip, unspecified osteoarthritis type [M16.11]  Procedure(s) (LRB):  RIGHT TOTAL HIP ARTHROPLASTY ANTERIOR APPROACH (Right) 1 Day Post-Op   Precautions:        ASSESSMENT  Based on the objective data described below, the patient presents with decreased independence with self care and functional mobility d/t pain, decreased ROM, LE distal reach, balance, strength, and activity tolerance. Pt reports baseline mod I with functional mobility with RW and self care.  Pt reports living with daughters however they are unable to assist with ADLs/ functional mobility. Pt presents below baseline indicated by functional mobility with RW with up to max A X2 and ADLs up to max A. Pt completed transfer to [facility] with mod A with additional time and mod cues for safe hand/RW placement. Pt completed toileting with max A for assist with clothing management and min A for bladder hygiene. Pt transferred to recliner with mod A X1. Pt would benefit from continued OT during acute hospitalization for continued education in safe engagement in ADLs and functional mobility. D/c recommendation is SNF. Current Level of Function Impacting Discharge (ADLs/self-care): bed mobility with max AX2, functional mobility with RW with mod A, ADLs up to max A    Functional Outcome Measure: The patient scored Total: 45/100 on the Barthel Index outcome measure which is indicative of being partially dependent in basic self-care. Other factors to consider for discharge: PLOF, limited assist at home     Patient will benefit from skilled therapy intervention to address the above noted impairments. PLAN :  Recommendations and Planned Interventions: self care training, functional mobility training, therapeutic exercise, balance training, therapeutic activities, endurance activities, patient education, home safety training, and family training/education    Frequency/Duration: Patient will be followed by occupational therapy 5 times a week to address goals. Recommendation for discharge: (in order for the patient to meet his/her long term goals)  Therapy up to 5 days/week in SNF setting    This discharge recommendation:  Has been made in collaboration with the attending provider and/or case management    IF patient discharges home will need the following DME: TBD       SUBJECTIVE:   Patient stated I want to go to rehab before going home, I need to be able to take care of myself. 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    Hypertension     PUD (peptic ulcer disease)      Past Surgical History:   Procedure Laterality Date    HX HEENT Right 2013    RESTORED VISION    HX HYSTERECTOMY  1971    ND ABDOMEN SURGERY PROC UNLISTED  2016    REMOVED CANCER FROM STOMACH       Expanded or extensive additional review of patient history:     Home Situation  Home Environment: Private residence  # Steps to Enter: 2  Rails to Enter: Yes  Hand Rails : Bilateral  Wheelchair Ramp: No  One/Two Story Residence: Two story  Lift Chair Available: Yes  Living Alone: No  Support Systems: Child(zandra)  Patient Expects to be Discharged to[de-identified] Skilled nursing facility  Current DME Used/Available at Home: Shower chair, Grab bars  Tub or Shower Type: Tub/Shower combination      EXAMINATION OF PERFORMANCE DEFICITS:  Cognitive/Behavioral Status:  Neurologic State: Alert  Orientation Level: Oriented X4  Cognition: Appropriate decision making; Appropriate for age attention/concentration; Appropriate safety awareness; Follows commands  Perception: Appears intact  Perseveration: No perseveration noted  Safety/Judgement: Awareness of environment    Skin: bandage in place at R hip    Edema: typical post-op swelling     Hearing:       Vision/Perceptual:                                     Range of Motion:    AROM: Generally decreased, functional  PROM: Generally decreased, functional                      Strength:    Strength: Generally decreased, functional                Coordination:  Coordination: Within functional limits  Fine Motor Skills-Upper: Left Intact; Right Intact    Gross Motor Skills-Upper: Left Intact; Right Intact    Tone & Sensation:    Tone: Normal  Sensation: Intact                      Balance:  Sitting: Intact; Without support  Standing: Impaired; With support (RW)  Standing - Static: Fair;Constant support  Standing - Dynamic : Fair;Constant support    Functional Mobility and Transfers for ADLs:  Bed Mobility:  Supine to Sit: Assist x2;Additional time;Maximum assistance  Sit to Supine:  (pt left in recliner)  Scooting: Contact guard assistance    Transfers:  Sit to Stand: Moderate assistance;Assist x2; Additional time  Stand to Sit: Moderate assistance;Assist x2  Bed to Chair: Moderate assistance; Additional time  Toilet Transfer : Moderate assistance; Additional time    ADL Assessment:  Feeding: Supervision    Oral Facial Hygiene/Grooming: Minimum assistance    Bathing: Maximum assistance         Upper Body Dressing: Stand-by assistance    Lower Body Dressing: Maximum assistance    Toileting: Maximum assistance                ADL Intervention and task modifications:   Pt received supine and agreeable to therapy. Pt completed bed mobility with max A for RLE management and trunk clearance from bed. Pt completed grooming (wash face) EOB with set-up A. Pt complete sit to stand with mod A X2 with additional time. Pt transferred to [facility] with mod A and completed toileting with max A for clothing management and min A for bladder hygiene. Pt transferred to [facility] with mod A and set up for lunch. Pt thanked therapist for efforts. Grooming  Grooming Assistance: Set-up  Position Performed: Seated edge of bed  Washing Face: Set-up  Cues: Verbal cues provided;Visual cues provided; Tactile cues provided                             Toileting  Toileting Assistance: Maximum assistance  Bladder Hygiene: Moderate assistance  Clothing Management: Maximum assistance  Cues: Physical assistance for pants down;Physical assistance for pants up; Tactile cues provided;Verbal cues provided;Visual cues provided    Cognitive Retraining  Safety/Judgement: Awareness of environment         Functional Measure:    Barthel Index:  Bathin  Bladder: 5  Bowels: 10  Groomin  Dressin  Feeding: 10  Mobility: 0  Stairs: 0  Toilet Use: 5  Transfer (Bed to Chair and Back): 5  Total: 45/100      The Barthel ADL Index: Guidelines  1.  The index should be used as a record of what a patient does, not as a record of what a patient could do. 2. The main aim is to establish degree of independence from any help, physical or verbal, however minor and for whatever reason. 3. The need for supervision renders the patient not independent. 4. A patient's performance should be established using the best available evidence. Asking the patient, friends/relatives and nurses are the usual sources, but direct observation and common sense are also important. However direct testing is not needed. 5. Usually the patient's performance over the preceding 24-48 hours is important, but occasionally longer periods will be relevant. 6. Middle categories imply that the patient supplies over 50 per cent of the effort. 7. Use of aids to be independent is allowed. Score Interpretation (from [address] [facility] [address])    Independent   60-79 Minimally independent   40-59 Partially dependent   20-39 Very dependent   <20 Totally dependent     -Paula Webb., Barthel, D.W. (1965). Functional evaluation: the Barthel Index. [facility] ([address] (1997). The Barthel activities of daily living index: self-reporting versus actual performance in the old (> or = 75 years). Journal of [address] [place] [id number]), [facility], JANNETTEF, Denis Castorena., Kaur Monterroso. (1999). Measuring the change in disability after inpatient rehabilitation; comparison of the responsiveness of the Barthel Index and Functional [place] Measure. Journal of Neurology, Neurosurgery, and Psychiatry, 66(4), 461-952. Segundo Umanzor, N.J.A, LINETTE Rodriguez, & Radha August M.A. (2004) Assessment of post-stroke quality of life in cost-effectiveness studies: The usefulness of the Barthel Index and the EuroQoL-5D.  Quality of Life Research, 15, 910-48     Occupational Therapy Evaluation Charge Determination   History Examination Decision-Making   LOW Complexity : Brief history review  MEDIUM Complexity : 3-5 performance deficits relating to physical, cognitive , or psychosocial skils that result in activity limitations and / or participation restrictions LOW Complexity : No comorbidities that affect functional and no verbal or physical assistance needed to complete eval tasks       Based on the above components, the patient evaluation is determined to be of the following complexity level: LOW   Pain Rating:  Pt reported minimal pain following activity, no rating. RN aware and following. Activity Tolerance:   Good and requires frequent rest breaks    After treatment patient left in no apparent distress:    Sitting in chair, Heels elevated for pressure relief, Call bell within reach, Bed / chair alarm activated, and Caregiver / family present    COMMUNICATION/EDUCATION:   The patients plan of care was discussed with: Physical therapist and Registered nurse. Patient/family have participated as able in goal setting and plan of care. This patients plan of care is appropriate for delegation to [facility]. Thank you for this referral.  SHERRY De La Torre  Time Calculation: 35 mins  Regarding student involvement in patient care:  A student participated in this treatment session. Per CMS Medicare statements and AOTA guidelines I certify that the following was true:  1. I was present and directly observed the entire session. 2. I made all skilled judgments and clinical decisions regarding care. 3. I am the practitioner responsible for assessment, treatment, and documentation.

## 2022-07-29 LAB
COVID-19 RAPID TEST, COVR: ABNORMAL
SOURCE, COVRS: ABNORMAL

## 2022-07-29 PROCEDURE — G0378 HOSPITAL OBSERVATION PER HR: HCPCS

## 2022-07-29 PROCEDURE — 97535 SELF CARE MNGMENT TRAINING: CPT

## 2022-07-29 PROCEDURE — 97530 THERAPEUTIC ACTIVITIES: CPT

## 2022-07-29 PROCEDURE — 74011250637 HC RX REV CODE- 250/637

## 2022-07-29 PROCEDURE — 97116 GAIT TRAINING THERAPY: CPT

## 2022-07-29 PROCEDURE — 74011250637 HC RX REV CODE- 250/637: Performed by: PHYSICIAN ASSISTANT

## 2022-07-29 PROCEDURE — 74011000250 HC RX REV CODE- 250: Performed by: PHYSICIAN ASSISTANT

## 2022-07-29 PROCEDURE — 77030038269 HC DRN EXT URIN PURWCK BARD -A

## 2022-07-29 PROCEDURE — 87635 SARS-COV-2 COVID-19 AMP PRB: CPT

## 2022-07-29 RX ORDER — FAMOTIDINE 20 MG/1
20 TABLET, FILM COATED ORAL DAILY
Status: DISCONTINUED | OUTPATIENT
Start: 2022-07-30 | End: 2022-08-02 | Stop reason: HOSPADM

## 2022-07-29 RX ADMIN — TRAMADOL HYDROCHLORIDE 50 MG: 50 TABLET ORAL at 18:27

## 2022-07-29 RX ADMIN — FAMOTIDINE 20 MG: 20 TABLET, FILM COATED ORAL at 08:47

## 2022-07-29 RX ADMIN — TIMOLOL MALEATE 1 DROP: 5 SOLUTION OPHTHALMIC at 18:27

## 2022-07-29 RX ADMIN — ACETAMINOPHEN 650 MG: 325 TABLET ORAL at 06:57

## 2022-07-29 RX ADMIN — SODIUM CHLORIDE, PRESERVATIVE FREE 10 ML: 5 INJECTION INTRAVENOUS at 22:26

## 2022-07-29 RX ADMIN — POTASSIUM CHLORIDE 10 MEQ: 750 TABLET, FILM COATED, EXTENDED RELEASE ORAL at 08:48

## 2022-07-29 RX ADMIN — TIMOLOL MALEATE 1 DROP: 5 SOLUTION OPHTHALMIC at 08:49

## 2022-07-29 RX ADMIN — FLECAINIDE ACETATE 50 MG: 50 TABLET ORAL at 08:48

## 2022-07-29 RX ADMIN — FUROSEMIDE 20 MG: 20 TABLET ORAL at 08:48

## 2022-07-29 RX ADMIN — PRAVASTATIN SODIUM 40 MG: 40 TABLET ORAL at 08:47

## 2022-07-29 RX ADMIN — POLYETHYLENE GLYCOL 3350 17 G: 17 POWDER, FOR SOLUTION ORAL at 08:48

## 2022-07-29 RX ADMIN — DOCUSATE SODIUM 50MG AND SENNOSIDES 8.6MG 1 TABLET: 8.6; 5 TABLET, FILM COATED ORAL at 18:27

## 2022-07-29 RX ADMIN — SODIUM CHLORIDE, PRESERVATIVE FREE 10 ML: 5 INJECTION INTRAVENOUS at 06:58

## 2022-07-29 RX ADMIN — FLECAINIDE ACETATE 50 MG: 50 TABLET ORAL at 22:24

## 2022-07-29 RX ADMIN — ACETAMINOPHEN 650 MG: 325 TABLET ORAL at 00:01

## 2022-07-29 RX ADMIN — LOSARTAN POTASSIUM 50 MG: 50 TABLET, FILM COATED ORAL at 08:46

## 2022-07-29 RX ADMIN — RIVAROXABAN 20 MG: 20 TABLET, FILM COATED ORAL at 18:27

## 2022-07-29 RX ADMIN — DOCUSATE SODIUM 50MG AND SENNOSIDES 8.6MG 1 TABLET: 8.6; 5 TABLET, FILM COATED ORAL at 08:47

## 2022-07-29 RX ADMIN — ACETAMINOPHEN 650 MG: 325 TABLET ORAL at 11:54

## 2022-07-29 RX ADMIN — ACETAMINOPHEN 650 MG: 325 TABLET ORAL at 18:27

## 2022-07-29 NOTE — PROGRESS NOTES
Problem: Falls - Risk of  Goal: *Absence of Falls  Description: Document [place]mi [place] Fall Risk and appropriate interventions in the flowsheet.   Outcome: Progressing Towards Goal  Note: Fall Risk Interventions:  Mobility Interventions: Bed/chair exit alarm, Communicate number of staff needed for ambulation/transfer, OT consult for ADLs, Patient to call before getting OOB, PT Consult for mobility concerns, PT Consult for assist device competence         Medication Interventions: Evaluate medications/consider consulting pharmacy, Teach patient to arise slowly, Patient to call before getting OOB                   Problem: Patient Education: Go to Patient Education Activity  Goal: Patient/Family Education  Outcome: Progressing Towards Goal     Problem: Pain  Goal: *Control of Pain  Outcome: Progressing Towards Goal     Problem: Patient Education: Go to Patient Education Activity  Goal: Patient/Family Education  Outcome: Progressing Towards Goal

## 2022-07-29 NOTE — PROGRESS NOTES
Transition Of Care: Update 4:11pm: Insurance auth started with [facility]. Reference # [id number]. 2:50pm: SNF referrals pending. CM called KATIE and left message to initiate insurance authorization for SNF. Awaiting call back. BLS to transport. RUR: N/A    1st choice: [facility] - pending  2nd choice: [facility] -pending  3rd choice: [facility]  (accepted)      CM called L[facility] and left message with liaison. CM called Irena Corado and she is reviewing referral.     CM following for discharge needs.     Loly Rush RN/CRM

## 2022-07-29 NOTE — PROGRESS NOTES
Resting comfortably. GEN:  NAD.  AOx3   ABD:  S/NT/ND   RLE:  Dressing C/D/I , 5/5 motor, Calf nttp (Bilat), Sensation grossly intact to light touch throughout, 1+ dp/pt pulses, foot perfused    Patient Vitals for the past 24 hrs:   Temp Pulse Resp BP SpO2   07/29/22 0420 98 °F (36.7 °C) 71 16 (!) 119/59 96 %   07/28/22 2052 98.2 °F (36.8 °C) 70 18 136/62 96 %   07/28/22 1432 98.8 °F (37.1 °C) 64 18 (!) 157/69 90 %         Current Facility-Administered Medications   Medication Dose Route Frequency    timolol (TIMOPTIC) 0.5 % ophthalmic solution 1 Drop  1 Drop Both Eyes BID    rivaroxaban (XARELTO) tablet 20 mg  20 mg Oral DAILY WITH DINNER    flecainide (TAMBOCOR) tablet 50 mg  50 mg Oral Q12H    furosemide (LASIX) tablet 20 mg  20 mg Oral DAILY    losartan (COZAAR) tablet 50 mg  50 mg Oral DAILY    potassium chloride SR (KLOR-CON 10) tablet 10 mEq  10 mEq Oral DAILY    sodium chloride (NS) flush 5-40 mL  5-40 mL IntraVENous Q8H    sodium chloride (NS) flush 5-40 mL  5-40 mL IntraVENous PRN    acetaminophen (TYLENOL) tablet 650 mg  650 mg Oral Q6H    oxyCODONE IR (ROXICODONE) tablet 5 mg  5 mg Oral Q3H PRN    naloxone (NARCAN) injection 0.4 mg  0.4 mg IntraVENous PRN    famotidine (PEPCID) tablet 20 mg  20 mg Oral BID    senna-docusate (PERICOLACE) 8.6-50 mg per tablet 1 Tablet  1 Tablet Oral BID    polyethylene glycol (MIRALAX) packet 17 g  17 g Oral DAILY    bisacodyL (DULCOLAX) suppository 10 mg  10 mg Rectal DAILY PRN    traMADoL (ULTRAM) tablet 50 mg  50 mg Oral Q6H PRN    pravastatin (PRAVACHOL) tablet 40 mg  40 mg Oral DAILY       Lab Results   Component Value Date/Time    HGB 11.4 (L) 07/28/2022 05:17 AM    INR 1.1 07/21/2022 12:12 PM       Lab Results   Component Value Date/Time    Sodium 136 07/28/2022 05:17 AM    Potassium 5.0 07/28/2022 05:17 AM    Chloride 104 07/28/2022 05:17 AM    CO2 27 07/28/2022 05:17 AM    BUN 12 07/28/2022 05:17 AM    Creatinine 0.67 07/28/2022 05:17 AM    Calcium 8.6 07/28/2022 05:17 AM            80 y.o. female s/p right direct anterior total hip arthroplasty on 7/27/2022  . Doing well. Precautions: None  ABX: Complete 24 hours perioperative abx  PATHWAY: Straight cath per protocol  DVT Prophylaxis: home Xarelto  Weight Bearing: WBAT RLE   Pain Control: Tylenol, tramadol every 6 hours, oxy 5 mg for breakthrough pain  Disposition: Plans to dispo to SNF once accepted. CM aware and following.

## 2022-07-29 NOTE — PROGRESS NOTES
Problem: Mobility Impaired (Adult and Pediatric)  Goal: *Acute Goals and Plan of Care (Insert Text)  Description: FUNCTIONAL STATUS PRIOR TO ADMISSION: Patient was modified independent using a rolling walker and single point cane for functional mobility. HOME SUPPORT PRIOR TO ADMISSION: The patient lived with daughter but did not require assist and daughter is disabled and is unable to assist her. Physical Therapy Goals  Initiated 7/28/2022    1. Patient will move from supine to sit and sit to supine , scoot up and down, and roll side to side in bed with minimal assistance/contact guard assist within 4 days. 2. Patient will perform sit to stand with minimal assistance/contact guard assist within 4 days. 3. Patient will ambulate with minimal assistance/contact guard assist for 50 feet with the least restrictive device within 4 days. 4. Patient will ascend/descend 4 stairs with cane and one handrail(s) with minimal assistance/contact guard assist within 4 days. 5. Patient will perform post-SHANON home exercise program per protocol with independence within 4 days. Outcome: Progressing Towards Goal   PHYSICAL THERAPY TREATMENT  Patient: Parish Mendiola (30 y.o. female)  Date: 7/29/2022  Diagnosis: Primary osteoarthritis of right hip [M16.11]  Osteoarthritis of right hip, unspecified osteoarthritis type [M16.11] <principal problem not specified>  Procedure(s) (LRB):  RIGHT TOTAL HIP ARTHROPLASTY ANTERIOR APPROACH (Right) 2 Days Post-Op  Precautions: Fall, WBAT  Chart, physical therapy assessment, plan of care and goals were reviewed. ASSESSMENT  Patient continues with skilled PT services and is progressing towards goals. Patient mobility has already improved today. She is eager to participate in therapies. Requesting to go to toilet. Walked to toilet, performed toilet hygiene and hand washing, walked in room with RW. Left up in recliner at bed side. OT planning to see for bathing and grooming. Current Level of Function Impacting Discharge (mobility/balance): Moderate assistance for bed mobility, Minimal for transfers and gait. Ambulated 20 ft with RW and gait belt, better right foot clearance today. Other factors to consider for discharge: Motivated/A & O x 4/Supportive Family/Independent PLOF          PLAN :  Patient continues to benefit from skilled intervention to address the above impairments. Continue treatment per established plan of care. to address goals. Recommendation for discharge: (in order for the patient to meet his/her long term goals)  Therapy up to 5 days/week in SNF setting    This discharge recommendation:  Has been made in collaboration with the attending provider and/or case management    IF patient discharges home will need the following DME: patient owns DME required for discharge       SUBJECTIVE:   Patient stated I am ready to walk more today.     OBJECTIVE DATA SUMMARY:   Critical Behavior:  Neurologic State: Agitated  Orientation Level: Oriented X4  Cognition: Appropriate decision making  Safety/Judgement: Awareness of environment  Functional Mobility Training:  Bed Mobility:     Supine to Sit: Moderate assistance;Assist x2  Sit to Supine:  (Left up in recliner)           Transfers:  Sit to Stand: Minimum assistance;Assist x1  Stand to Sit: Minimum assistance;Assist x1        Bed to Chair: Minimum assistance;Assist x1                    Balance:  Sitting: Intact; With support  Standing: Impaired; Without support  Standing - Static: Good;Constant support  Standing - Dynamic : Fair;Constant support  Ambulation/Gait Training:  Distance (ft): 20 Feet (ft)  Assistive Device: Walker, rolling;Gait belt  Ambulation - Level of Assistance: Minimal assistance        Gait Abnormalities: Antalgic;Decreased step clearance; Step to gait  Right Side Weight Bearing: As tolerated     Base of Support: Widened;Shift to left  Stance: Right decreased  Speed/Felicitas: Slow;Shuffled  Step Length: Left shortened  Swing Pattern: Right asymmetrical     Interventions: Safety awareness training;Verbal cues           Stairs: Therapeutic Exercises: Ankle Pumps  Ham Sets  Quad Sets  Glute Sets  Heel Slides  X 10 each, 5-7x daily      Pain Ratin/10    Activity Tolerance:   Good    After treatment patient left in no apparent distress:   Sitting in chair, Call bell within reach, Bed / chair alarm activated, and nurse notified. COMMUNICATION/COLLABORATION:   The patients plan of care was discussed with: Occupational therapist, Registered nurse, Physician, and Case management.      Faby Vallecillo   Time Calculation: 30 mins

## 2022-07-29 NOTE — PROGRESS NOTES
Bedside shift change report given to [facility] (oncoming nurse) by Shaun Silva (offgoing nurse). Report included the following information SBAR, Kardex, Intake/Output, and MAR.

## 2022-07-29 NOTE — PROGRESS NOTES
Renal Dosing/Monitoring  Medication: Famotidine   Current regimen:  20 mg PO BID  Recent Labs     07/28/22  0517   CREA 0.67   BUN 12     Estimated CrCl:  46.1 ml/min  Plan: Change to famotidine 20 mg PO daily per [facility] P&T Committee Protocol with respect to renal function. Pharmacy will continue to monitor patient daily and will make dosage adjustments based upon changing renal function.

## 2022-07-29 NOTE — PROGRESS NOTES
Bedside and Verbal shift change report given to Tomasz Decker (oncoming nurse) by Tristan Mccullough RN (offgoing nurse). Report included the following information SBAR, Kardex, ED Summary, Intake/Output, MAR, and Recent Results.

## 2022-07-29 NOTE — DISCHARGE INSTRUCTIONS
Discharge Instructions Hip Replacement  Dr. Maia Schulz    Patient Name  Mackenzie Bang  Date of procedure  7/27/2022    Procedure  Procedure(s):  RIGHT TOTAL HIP ARTHROPLASTY ANTERIOR APPROACH  Surgeon  Surgeon(s) and Role:     * Ziggy Monique MD - Primary  Date of discharge: [unfilled]  PCP: @PCP@    Follow up care  Follow up visit with Dr. Maia Schulz in 4 weeks. Call [phone] Armand Ortiz to make an appointment. If Home Health has been arranged for you, they will call you to arrange dates/times for visits. Call them if you do not hear from them within 24 hours after you go home. Activity at home  AVOID sudden and extreme movement of your hip (surgical leg)  Take a short walk every hour; except at night when sleeping. Do your Home Exercise Program 3 times every day. After exercising lie down and elevate your leg on pillows for 15-30 minutes to decrease swelling. Refer to your patient notebook for more information. Bathing and caring for your incision  You may take a shower with your waterproof dressing on your hip. The waterproof dressing is to stay on your hip for 7 days. On the 7th day have someone gently peel the dressing off by lifting the edge and stretching it to break the seal.  You may then leave your incision open to air unless you see drainage from your hip. Preventing blood clots  Resume home Xeralto  Call Dr. Maia Schulz for signs of a blood clot in your leg: calf pain, tenderness, redness, swelling of lower leg   Preventing lung congestion  Use your incentive spirometer 4 times a day; do 10 repetitions each time  Remember to keep the small blue ball between the two arrows when taking a slow, deep breath   Pain Management  Get up and walk a short distance to relieve pain and stiffness. Place ice wrap on your hip except when you are walking. The gel ice packs should be changed about every 4 hours. Elevate your leg on pillows for 15-30 minutes.   Pain Medications  Take Tylenol 650mg (take two 325mg tablets) every 6 hours for the next 4 weeks. If needed, take Tramadol (narcotic pain pill) every 6 hours as prescribed. Take all medications with a small amount of food. As your pain decreases, take the narcotics less often or take ½ of a pill. Call Dr. Marie Preston if you have side effects from your narcotic pain medication: itching, drowsiness, dizziness, upset stomach, dry mouth, constipation or if you medication is not relieving your pain. Diet after surgery  You may resume your normal diet. Include vegetables, fruit, whole grains, lean meats, and low-fat dairy products. Eat food high in fiber. Drink plenty of fluids, including 8 cups of water daily Take a stool softener (Senokot-s or Colace) to prevent constipation. If constipation occurs you may take a laxative (Milk of Magnesia, Dulcolax tablets). Avoid after surgery  Do not take any over-the-counter medication for pain except Tylenol  Do not take more than 3000mg (3 Grams) of Tylenol in 24 hours  Do not drink alcoholic beverages  Do not smoke  Do not drive until seen for follow up appointment  Do not place frozen gel pack directly on your skin. It can cause frostbite. Do not take a tub bath, swim or get in a hot tub for 8 weeks  Prevention of falls and safety at home  Set up an area where you can rest comfortably leaving space around furniture to allow you to walk with your walker. Keep stairs, hallways and bathrooms well lit; especially at night. Arrange for care for your pets. Keep your home free of clutter.    Call Dr. Marie Preston at [phone] for:  Pain that is not relieved by pain medication, ice and activity  Side effects of medications  Increased/spread of bruising  Warning signs of infection:  persistent fever greater than 100 degrees  shaking or chills  increased redness, tenderness, swelling or drainage from incision  increased pain during activity or rest  Warning signs of a blood clot in your leg:  increased pain in your calf  tenderness or redness  increased swelling or knee, calf, ankle or foot    Call [phone] after 5pm or on a weekend.  The on call physician will return your phone call  Call your Primary Care Doctor for:   Concerns about your medical conditions such as diabetes, high blood pressure, asthma, congestive heart failure  Blood sugars greater than 180  Persistent headache or dizziness  Coughing or congestion  Constipation or diarrhea  Burning when you go to the bathroom  Abnormal heart rate (fast or slow)      Call 911 and go to the nearest hospital for:   Sudden increased shortness of breath  Sudden onset of chest pain  Difficulty breathing  Localized chest pain with coughing or taking a deep breath

## 2022-07-29 NOTE — PROGRESS NOTES
Problem: Self Care Deficits Care Plan (Adult)  Goal: *Acute Goals and Plan of Care (Insert Text)  Description: FUNCTIONAL STATUS PRIOR TO ADMISSION: Patient was independent and active without use of DME. Patient was modified independent for basic and instrumental ADLs. HOME SUPPORT: The patient lived with two daughters but did not require assist. Daughters are unable to provide assist at home. Occupational Therapy Goals  Initiated 7/28/2022  1. Patient will perform grooming with modified independence within 4 day(s). 2.  Patient will perform bathing with modified independence within 4 day(s). 3.  Patient will perform upper body dressing and lower body dressing with modified independence within 4 day(s). 4.  Patient will perform toilet transfers with modified independence within 4 day(s). 5.  Patient will perform all aspects of toileting with modified independence within 4 day(s). 6.  Patient will participate in upper extremity therapeutic exercise/activities with modified independence for 10 minutes within 4 day(s). 7.  Patient will utilize energy conservation techniques during functional activities with verbal cues within 4 day(s). Outcome: Progressing Towards Goal     OCCUPATIONAL THERAPY TREATMENT  Patient: Jeb Terrazas (83 y.o. female)  Date: 7/29/2022  Diagnosis: Primary osteoarthritis of right hip [M16.11]  Osteoarthritis of right hip, unspecified osteoarthritis type [M16.11] <principal problem not specified>  Procedure(s) (LRB):  RIGHT TOTAL HIP ARTHROPLASTY ANTERIOR APPROACH (Right) 2 Days Post-Op  Precautions: Fall, WBAT  Chart, occupational therapy assessment, plan of care, and goals were reviewed. ASSESSMENT  Patient continues with skilled OT services and is progressing towards goals. Pt received sitting in chair and agreeable to therapy.  Pt completed grooming, UE bathing, and UE dressing seated with set-up assist. Pt completed LE bathing seated and standing with CGA for dynamic balance. Pt demonstrated strong motivation and participation in therapy. Will continue to follow during acute hospitalization. D/c recommendation is SNF. Current Level of Function Impacting Discharge (ADLs): functional mobility with RW with CGA; ADLs up to min A     Other factors to consider for discharge: PLOF, preference for rehab prior to home         PLAN :  Patient continues to benefit from skilled intervention to address the above impairments. Continue treatment per established plan of care to address goals. Recommend with staff: Recommend with nursing, ADLs with assist, OOB to chair 3x/day via rolling walker and toileting via functional mobility to and from bathroom. Thank you for completing as able in order to maintain patient strength, endurance and independence. Recommend next OT session: continue with current POC    Recommendation for discharge: (in order for the patient to meet his/her long term goals)  Therapy up to 5 days/week in SNF setting    This discharge recommendation:  Has been made in collaboration with the attending provider and/or case management    IF patient discharges home will need the following DME: TBD       SUBJECTIVE:   Patient stated I want to keep working towards doing everything myself.     OBJECTIVE DATA SUMMARY:   Cognitive/Behavioral Status:  Neurologic State: Alert  Orientation Level: Oriented X4  Cognition: Appropriate decision making; Appropriate for age attention/concentration; Appropriate safety awareness; Follows commands  Perception: Appears intact  Perseveration: No perseveration noted  Safety/Judgement: Awareness of environment    Functional Mobility and Transfers for ADLs:  Bed Mobility:  Supine to Sit: Moderate assistance;Assist x2  Sit to Supine:  (Left up in recliner)  Scooting: Stand-by assistance    Transfers:  Sit to Stand: Contact guard assistance     Bed to Chair: Minimum assistance;Assist x1    Balance:  Sitting: Intact; Without support  Standing: Impaired; With support (RW)  Standing - Static: Good;Constant support  Standing - Dynamic : Fair;Constant support    ADL Intervention:   Pt received sitting in chair. Pt agreeable to therapy. Pt completed brushing teeth, washing face, UE bathing with set-up assist. Therapist did assist with washing pt back. Pt completed distal LE bathing with set-up assist and proximal LE bathing standing with CGA for dynamic balance. Pt completed UE dressing with set-up assist. Pt current needs met. Grooming  Grooming Assistance: Set-up  Position Performed: Seated in chair  Washing Face: Set-up  Brushing Teeth: Set-up  Cues: Verbal cues provided    Upper Body Bathing  Bathing Assistance: Set-up  Position Performed: Seated in chair  Cues: Verbal cues provided    Type of Bath: Soap/Water/ [place]    Lower Body Bathing  Bathing Assistance: Contact guard assistance  Perineal  : Contact guard assistance  Position Performed: Standing  Lower Body : Stand-by assistance  Position Performed: Seated in chair  Cues: Tactile cues provided;Visual cues provided;Verbal cues provided    Upper Body Dressing Assistance  Dressing Assistance: [facility]: Set-up              Cognitive Retraining  Safety/Judgement: Awareness of environment    Therapeutic Exercises:   2 repetitions of sit to stands    Pain:  None reported    Activity Tolerance:   Good    After treatment patient left in no apparent distress:   Sitting in chair, Heels elevated for pressure relief, Call bell within reach, and Bed / chair alarm activated    COMMUNICATION/COLLABORATION:   The patients plan of care was discussed with: Physical therapist and Registered nurse. SHERRY Harper  Time Calculation: 26 mins    Regarding student involvement in patient care:  A student participated in this treatment session. Per CMS Medicare statements and AOTA guidelines I certify that the following was true:  1.  I was present and directly observed the entire session. 2. I made all skilled judgments and clinical decisions regarding care. 3. I am the practitioner responsible for assessment, treatment, and documentation.

## 2022-07-30 LAB
COVID-19 RAPID TEST, COVR: NOT DETECTED
SOURCE, COVRS: NORMAL

## 2022-07-30 PROCEDURE — 97116 GAIT TRAINING THERAPY: CPT

## 2022-07-30 PROCEDURE — G0378 HOSPITAL OBSERVATION PER HR: HCPCS

## 2022-07-30 PROCEDURE — 74011250637 HC RX REV CODE- 250/637: Performed by: ORTHOPAEDIC SURGERY

## 2022-07-30 PROCEDURE — 74011250637 HC RX REV CODE- 250/637: Performed by: PHYSICIAN ASSISTANT

## 2022-07-30 PROCEDURE — 97535 SELF CARE MNGMENT TRAINING: CPT

## 2022-07-30 PROCEDURE — 74011000250 HC RX REV CODE- 250: Performed by: PHYSICIAN ASSISTANT

## 2022-07-30 PROCEDURE — 87635 SARS-COV-2 COVID-19 AMP PRB: CPT

## 2022-07-30 PROCEDURE — 74011250637 HC RX REV CODE- 250/637

## 2022-07-30 RX ADMIN — DOCUSATE SODIUM 50MG AND SENNOSIDES 8.6MG 1 TABLET: 8.6; 5 TABLET, FILM COATED ORAL at 09:48

## 2022-07-30 RX ADMIN — SODIUM CHLORIDE, PRESERVATIVE FREE 10 ML: 5 INJECTION INTRAVENOUS at 22:00

## 2022-07-30 RX ADMIN — ACETAMINOPHEN 650 MG: 325 TABLET ORAL at 07:00

## 2022-07-30 RX ADMIN — TIMOLOL MALEATE 1 DROP: 5 SOLUTION OPHTHALMIC at 09:50

## 2022-07-30 RX ADMIN — RIVAROXABAN 20 MG: 20 TABLET, FILM COATED ORAL at 17:47

## 2022-07-30 RX ADMIN — FLECAINIDE ACETATE 50 MG: 50 TABLET ORAL at 09:48

## 2022-07-30 RX ADMIN — SODIUM CHLORIDE, PRESERVATIVE FREE 10 ML: 5 INJECTION INTRAVENOUS at 17:48

## 2022-07-30 RX ADMIN — ACETAMINOPHEN 650 MG: 325 TABLET ORAL at 19:31

## 2022-07-30 RX ADMIN — FAMOTIDINE 20 MG: 20 TABLET ORAL at 09:48

## 2022-07-30 RX ADMIN — SODIUM CHLORIDE, PRESERVATIVE FREE 10 ML: 5 INJECTION INTRAVENOUS at 07:54

## 2022-07-30 RX ADMIN — POTASSIUM CHLORIDE 10 MEQ: 750 TABLET, FILM COATED, EXTENDED RELEASE ORAL at 09:48

## 2022-07-30 RX ADMIN — TRAMADOL HYDROCHLORIDE 50 MG: 50 TABLET ORAL at 07:55

## 2022-07-30 RX ADMIN — PRAVASTATIN SODIUM 40 MG: 40 TABLET ORAL at 09:48

## 2022-07-30 RX ADMIN — FLECAINIDE ACETATE 50 MG: 50 TABLET ORAL at 22:36

## 2022-07-30 RX ADMIN — ACETAMINOPHEN 650 MG: 325 TABLET ORAL at 14:31

## 2022-07-30 RX ADMIN — FUROSEMIDE 20 MG: 20 TABLET ORAL at 09:48

## 2022-07-30 RX ADMIN — TIMOLOL MALEATE 1 DROP: 5 SOLUTION OPHTHALMIC at 17:46

## 2022-07-30 RX ADMIN — TRAMADOL HYDROCHLORIDE 50 MG: 50 TABLET ORAL at 22:37

## 2022-07-30 RX ADMIN — ACETAMINOPHEN 650 MG: 325 TABLET ORAL at 00:00

## 2022-07-30 RX ADMIN — POLYETHYLENE GLYCOL 3350 17 G: 17 POWDER, FOR SOLUTION ORAL at 09:48

## 2022-07-30 NOTE — PROGRESS NOTES
Problem: Falls - Risk of  Goal: *Absence of Falls  Description: Document Christopher Meyers Fall Risk and appropriate interventions in the flowsheet.   Outcome: Resolved/Met     Problem: Patient Education: Go to Patient Education Activity  Goal: Patient/Family Education  Outcome: Resolved/Met     Problem: Patient Education: Go to Patient Education Activity  Goal: Patient/Family Education  Outcome: Resolved/Met     Problem: Patient Education: Go to Patient Education Activity  Goal: Patient/Family Education  Outcome: Resolved/Met     Problem: Pain  Goal: *Control of Pain  Outcome: Resolved/Met     Problem: Patient Education: Go to Patient Education Activity  Goal: Patient/Family Education  Outcome: Resolved/Met     Problem: General Medical Care Plan  Goal: *Vital signs within specified parameters  Outcome: Resolved/Met  Goal: *Labs within defined limits  Outcome: Resolved/Met  Goal: *Absence of infection signs and symptoms  Outcome: Resolved/Met  Goal: *Optimal pain control at patient's stated goal  Outcome: Resolved/Met  Goal: *Skin integrity maintained  Outcome: Resolved/Met  Goal: *Fluid volume balance  Outcome: Resolved/Met  Goal: *Optimize nutritional status  Outcome: Resolved/Met  Goal: *Anxiety reduced or absent  Outcome: Resolved/Met  Goal: *Progressive mobility and function (eg: ADL's)  Outcome: Resolved/Met     Problem: Patient Education: Go to Patient Education Activity  Goal: Patient/Family Education  Outcome: Resolved/Met

## 2022-07-30 NOTE — PROGRESS NOTES
Problem: Mobility Impaired (Adult and Pediatric)  Goal: *Acute Goals and Plan of Care (Insert Text)  Description: FUNCTIONAL STATUS PRIOR TO ADMISSION: Patient was modified independent using a rolling walker and single point cane for functional mobility. HOME SUPPORT PRIOR TO ADMISSION: The patient lived with daughter but did not require assist and daughter is disabled and is unable to assist her. Physical Therapy Goals  Initiated 7/28/2022    1. Patient will move from supine to sit and sit to supine , scoot up and down, and roll side to side in bed with minimal assistance/contact guard assist within 4 days. 2. Patient will perform sit to stand with minimal assistance/contact guard assist within 4 days. 3. Patient will ambulate with minimal assistance/contact guard assist for 50 feet with the least restrictive device within 4 days. 4. Patient will ascend/descend 4 stairs with cane and one handrail(s) with minimal assistance/contact guard assist within 4 days. 5. Patient will perform post-SHANON home exercise program per protocol with independence within 4 days. Outcome: Progressing Towards Goal    PHYSICAL THERAPY TREATMENT  Patient: Ramona Sevilla (44 y.o. female)  Date: 7/30/2022  Diagnosis: Primary osteoarthritis of right hip [M16.11]  Osteoarthritis of right hip, unspecified osteoarthritis type [M16.11] <principal problem not specified>  Procedure(s) (LRB):  RIGHT TOTAL HIP ARTHROPLASTY ANTERIOR APPROACH (Right) 3 Days Post-Op  Precautions: Fall, WBAT  Chart, physical therapy assessment, plan of care and goals were reviewed. ASSESSMENT  Patient continues with skilled PT services and is progressing towards goals. Pt agreeable to therapy. Pt was able to decrease assist level and increase gait tolerance. Pt reports pain with mobility but tolerable. Pt is eager to improve mobility and independence. Pt could benefit from rehab at discharge to continue progression.       Current Level of Function Impacting Discharge (mobility/balance): min a     Other factors to consider for discharge: THR          PLAN :  Patient continues to benefit from skilled intervention to address the above impairments. Continue treatment per established plan of care. to address goals. Recommendation for discharge: (in order for the patient to meet his/her long term goals)  Therapy up to 5 days/week in SNF setting    This discharge recommendation:  Has not yet been discussed the attending provider and/or case management    IF patient discharges home will need the following DME: to be determined (TBD)       SUBJECTIVE:   Patient stated I want to get better.     OBJECTIVE DATA SUMMARY:   Critical Behavior:  Neurologic State: Alert  Orientation Level: Oriented X4  Cognition: Appropriate decision making, Appropriate for age attention/concentration, Appropriate safety awareness, Follows commands  Safety/Judgement: Awareness of environment, Fall prevention, Insight into deficits        Functional Mobility Training:  Bed Mobility:     Supine to Sit: Minimum assistance     Scooting: Supervision        Transfers:  Sit to Stand: Contact guard assistance  Stand to Sit: Contact guard assistance;Minimum assistance (loss of balance x 1)                             Balance:  Sitting: Intact  Standing: Impaired; With support  Standing - Static: Good;Fair;Constant support (1 loss of balance backward)  Standing - Dynamic : Constant support; Fair (1 loss of balance backwards in standing with RW)  Ambulation/Gait Training:  Distance (ft): 70 Feet (ft)  Assistive Device: Gait belt;Walker, rolling  Ambulation - Level of Assistance: Contact guard assistance;Minimal assistance        Gait Abnormalities: Antalgic;Decreased step clearance        Base of Support: Widened  Stance: Right decreased                          Stairs:               Therapeutic Exercises:     EXERCISE   Sets   Reps   Active Active Assist   Passive Self ROM   Comments   Ankle Pumps [x]                                        []                                        []                                        []                                           Quad Sets   [x]                                        []                                        []                                        []                                           Hip abduction   []                                        [x]                                        []                                        []                                              []                                        []                                        []                                        []                                                []                                          []                                          []                                          []                                           Heel Slides   [x]                                        []                                        []                                        []                                              []                                        []                                        []                                        []                                              []                                        []                                        []                                        []                                              []                                        []                                        []                                        []                                               Pain Rating:  Right hip    Activity Tolerance:   Limited     After treatment patient left in no apparent distress:   Sitting in chair and Call bell within reach    COMMUNICATION/COLLABORATION:   The patients plan of care was discussed with: Registered nurse.      Rika Vora PTA   Time Calculation: 20 mins

## 2022-07-30 NOTE — PROGRESS NOTES
Bedside and Verbal shift change report given to David Cheema RN (oncoming nurse) by Maddy Mustafa RN (offgoing nurse). Report included the following information SBAR, Kardex, Intake/Output, MAR and Recent Results.

## 2022-07-30 NOTE — PROGRESS NOTES
Problem: Self Care Deficits Care Plan (Adult)  Goal: *Acute Goals and Plan of Care (Insert Text)  Description: FUNCTIONAL STATUS PRIOR TO ADMISSION: Patient was independent and active without use of DME. Patient was modified independent for basic and instrumental ADLs. Has acorn chair lift in the home    HOME SUPPORT: The patient lived with two daughters but did not require assist. Daughters are unable to provide assist at home. Occupational Therapy Goals  Initiated 7/28/2022  1. Patient will perform grooming with modified independence within 4 day(s). 2.  Patient will perform bathing with modified independence within 4 day(s). 3.  Patient will perform upper body dressing and lower body dressing with modified independence within 4 day(s). 4.  Patient will perform toilet transfers with modified independence within 4 day(s). 5.  Patient will perform all aspects of toileting with modified independence within 4 day(s). 6.  Patient will participate in upper extremity therapeutic exercise/activities with modified independence for 10 minutes within 4 day(s). 7.  Patient will utilize energy conservation techniques during functional activities with verbal cues within 4 day(s). Outcome: Progressing Towards Goal   OCCUPATIONAL THERAPY TREATMENT  Patient: Roma Suazo (88 y.o. female)  Date: 7/30/2022  Diagnosis: Primary osteoarthritis of right hip [M16.11]  Osteoarthritis of right hip, unspecified osteoarthritis type [M16.11] <principal problem not specified>  Procedure(s) (LRB):  RIGHT TOTAL HIP ARTHROPLASTY ANTERIOR APPROACH (Right) 3 Days Post-Op  Precautions: Fall, WBAT  Chart, occupational therapy assessment, plan of care, and goals were reviewed. ASSESSMENT  Patient continues with skilled OT services and is progressing towards goals. Good participation with very limited c/o pain R hip THR 3 days post op, 2/10 at rest and with ADL and mobility tasks.  No pain increase by end of session, CGA-min A self care and functional mobility. Current Level of Function Impacting Discharge (ADLs): S-CGA functional transfers with no increased R hip pain, good safety awareness    Other factors to consider for discharge: very pleased-pain so much better post surgery         PLAN :  Patient continues to benefit from skilled intervention to address the above impairments. Continue treatment per established plan of care to address goals. Recommend with staff: up to bathroom, no purwick except at night; BSC OK    Recommend next OT session: LE AE    Recommendation for discharge: (in order for the patient to meet his/her long term goals)  Therapy up to 5 days/week in SNF setting    This discharge recommendation:  Has been made in collaboration with the attending provider and/or case management    IF patient discharges home will need the following DME: AE: long handled bathing, AE: long handled dressing, bedside commode, shower chair, and walker: rolling       SUBJECTIVE:   Patient stated Oh the pain is SO much better.     OBJECTIVE DATA SUMMARY:   Cognitive/Behavioral Status:  Neurologic State: Alert  Orientation Level: Oriented X4  Cognition: Appropriate decision making; Appropriate for age attention/concentration; Appropriate safety awareness; Follows commands  Perception: Appears intact  Perseveration: No perseveration noted  Safety/Judgement: Awareness of environment; Fall prevention; Insight into deficits    Functional Mobility and Transfers for ADLs:  Bed Mobility:  Supine to Sit: Minimum assistance  Scooting: Supervision    Transfers:  Sit to Stand: Contact guard assistance  Functional Transfers  Toilet Transfer : Contact guard assistance; Additional time; Adaptive equipment       Balance:  Sitting: Intact  Standing: Impaired; With support  Standing - Static: Good;Fair;Constant support (1 loss of balance backward)  Standing - Dynamic : Constant support; Fair (1 loss of balance backwards in standing with RW)    ADL Intervention:  Feeding  Feeding Assistance: Modified independent    Grooming  Grooming Assistance: Stand-by assistance  Position Performed: Standing                   Upper Body Dressing Assistance  Dressing Assistance: Set-up    Lower Body Dressing Assistance  Dressing Assistance: Moderate assistance  Protective Undergarmet: Minimum assistance  Socks: Maximum assistance;Minimum assistance  Position Performed: Seated in chair;Standing    Toileting  Toileting Assistance: Contact guard assistance  Bladder Hygiene: Supervision  Bowel Hygiene: Supervision  Clothing Management: Contact guard assistance    Cognitive Retraining  Attention to Task: Single task  Maintains Attention For (Time): Greater than 10 minutes  Following Commands: Follows one step commands/directions; Follows two step commands/directions  Safety/Judgement: Awareness of environment; Fall prevention; Insight into deficits      Pain:  2/10 R THR, ice applied post tx    Activity Tolerance:   Fair and SpO2 stable on RA    After treatment patient left in no apparent distress:   Sitting in chair, Call bell within reach, and Bed / chair alarm activated    COMMUNICATION/COLLABORATION:   The patients plan of care was discussed with: Physical therapy assistant, Registered nurse, and Case management.      Francisco Colunga OTR/L  Time Calculation: 29 mins

## 2022-07-30 NOTE — PROGRESS NOTES
Transition of Care    RUR: N/A Observation    Discharge order noted. Ms. Jeovanny Pereyra need authorization to go to a SNF. Authorization has not been granted at this time. Will continue to follow for discharge planning.   Signed By: Dylan Zheng LCSW     July 30, 2022

## 2022-07-30 NOTE — PROGRESS NOTES
Problem: Mobility Impaired (Adult and Pediatric)  Goal: *Acute Goals and Plan of Care (Insert Text)  Description: FUNCTIONAL STATUS PRIOR TO ADMISSION: Patient was modified independent using a rolling walker and single point cane for functional mobility. HOME SUPPORT PRIOR TO ADMISSION: The patient lived with daughter but did not require assist and daughter is disabled and is unable to assist her. Physical Therapy Goals  Initiated 7/28/2022    1. Patient will move from supine to sit and sit to supine , scoot up and down, and roll side to side in bed with minimal assistance/contact guard assist within 4 days. 2. Patient will perform sit to stand with minimal assistance/contact guard assist within 4 days. 3. Patient will ambulate with minimal assistance/contact guard assist for 50 feet with the least restrictive device within 4 days. 4. Patient will ascend/descend 4 stairs with cane and one handrail(s) with minimal assistance/contact guard assist within 4 days. 5. Patient will perform post-SHANON home exercise program per protocol with independence within 4 days. 7/30/2022 1200 by Cielo Dubon PTA  Outcome: Progressing Towards Goal       PHYSICAL THERAPY TREATMENT  Patient: Lilian Agee (46 y.o. female)  Date: 7/30/2022  Diagnosis: Primary osteoarthritis of right hip [M16.11]  Osteoarthritis of right hip, unspecified osteoarthritis type [M16.11] <principal problem not specified>  Procedure(s) (LRB):  RIGHT TOTAL HIP ARTHROPLASTY ANTERIOR APPROACH (Right) 3 Days Post-Op  Precautions: Fall, WBAT  Chart, physical therapy assessment, plan of care and goals were reviewed. ASSESSMENT  Patient continues with skilled PT services and is progressing towards goals. Pt was able to maintain gait tolerance. Pt is requiring v.c and assistance for sequencing and safety. Pt does not have anyone to assist with mobility at home.  Pt could benefit from rehab at discharge     Current Level of Function Impacting Discharge (mobility/balance): mod A    Other factors to consider for discharge: assist available at home. Right THR         PLAN :  Patient continues to benefit from skilled intervention to address the above impairments. Continue treatment per established plan of care. to address goals. Recommendation for discharge: (in order for the patient to meet his/her long term goals)  Therapy up to 5 days/week in SNF setting    This discharge recommendation:  Has not yet been discussed the attending provider and/or case management    IF patient discharges home will need the following DME: to be determined (TBD)       SUBJECTIVE:   Patient stated Claire Larose are we going.     OBJECTIVE DATA SUMMARY:   Critical Behavior:  Neurologic State: Alert  Orientation Level: Oriented X4  Cognition: Appropriate decision making, Appropriate for age attention/concentration, Appropriate safety awareness, Follows commands  Safety/Judgement: Awareness of environment, Fall prevention, Insight into deficits  Functional Mobility Training:  Bed Mobility:     Supine to Sit: Minimum assistance  Sit to Supine: Moderate assistance  Scooting: Supervision        Transfers:  Sit to Stand: Contact guard assistance  Stand to Sit: Contact guard assistance;Minimum assistance (loss of balance x 1)                             Balance:  Sitting: Intact  Standing: Impaired; With support  Standing - Static: Good;Fair;Constant support (1 loss of balance backward)  Standing - Dynamic : Constant support; Fair (1 loss of balance backwards in standing with RW)  Ambulation/Gait Training:  Distance (ft): 70 Feet (ft)  Assistive Device: Gait belt;Walker, rolling  Ambulation - Level of Assistance: Contact guard assistance;Minimal assistance        Gait Abnormalities: Antalgic;Decreased step clearance        Base of Support: Widened  Stance: Right decreased                          Stairs:               Therapeutic Exercises:     Pain Rating:  Right hip     Activity Tolerance:   Limited     After treatment patient left in no apparent distress:   Supine in bed and Call bell within reach    COMMUNICATION/COLLABORATION:   The patients plan of care was discussed with: Registered nurse.      Audra Gaytan PTA   Time Calculation: 13 mins

## 2022-07-30 NOTE — PROGRESS NOTES
POD 3 Days Post-Op    Procedure:  Procedure(s):  RIGHT TOTAL HIP ARTHROPLASTY ANTERIOR APPROACH    Subjective:     Patient doing well, complaining of appropriate post-op pain    Objective:     Blood pressure (!) 143/66, pulse 81, temperature 99.2 °F (37.3 °C), resp. rate 16, height 4' 11\" (1.499 m), weight 153 lb (69.4 kg), SpO2 96 %. Temp (24hrs), Av.1 °F (37.3 °C), Min:98.6 °F (37 °C), Max:99.5 °F (37.5 °C)      Physical Exam:  Examination of the right hip reveals that the incision is clean, dry and intact. Sensation is intact to light touch.  mild swelling. No calf pain.   NVSI    Labs:   Lab Results   Component Value Date/Time    HGB 11.4 (L) 2022 05:17 AM         Assessment:     Active Problems:    Osteoarthritis of right hip, unspecified osteoarthritis type (2022)        Procedure(s):  RIGHT TOTAL HIP ARTHROPLASTY ANTERIOR APPROACH    Plan/Recommendations/Medical Decision Making:     - pain control - oxy vs tramadol  - ice   - pt/ot  - dvt prophylaxis - xarelto  - d/c planning - [facility] pending        Charbel Carcamo DO

## 2022-07-31 PROCEDURE — 74011250637 HC RX REV CODE- 250/637

## 2022-07-31 PROCEDURE — 74011250637 HC RX REV CODE- 250/637: Performed by: PHYSICIAN ASSISTANT

## 2022-07-31 PROCEDURE — G0378 HOSPITAL OBSERVATION PER HR: HCPCS

## 2022-07-31 PROCEDURE — 74011000250 HC RX REV CODE- 250: Performed by: PHYSICIAN ASSISTANT

## 2022-07-31 PROCEDURE — 97110 THERAPEUTIC EXERCISES: CPT

## 2022-07-31 PROCEDURE — 74011250637 HC RX REV CODE- 250/637: Performed by: ORTHOPAEDIC SURGERY

## 2022-07-31 PROCEDURE — 97116 GAIT TRAINING THERAPY: CPT

## 2022-07-31 RX ADMIN — PRAVASTATIN SODIUM 40 MG: 40 TABLET ORAL at 09:07

## 2022-07-31 RX ADMIN — TIMOLOL MALEATE 1 DROP: 5 SOLUTION OPHTHALMIC at 09:08

## 2022-07-31 RX ADMIN — RIVAROXABAN 20 MG: 20 TABLET, FILM COATED ORAL at 17:59

## 2022-07-31 RX ADMIN — TRAMADOL HYDROCHLORIDE 50 MG: 50 TABLET ORAL at 14:12

## 2022-07-31 RX ADMIN — FLECAINIDE ACETATE 50 MG: 50 TABLET ORAL at 09:07

## 2022-07-31 RX ADMIN — ACETAMINOPHEN 650 MG: 325 TABLET ORAL at 09:07

## 2022-07-31 RX ADMIN — FAMOTIDINE 20 MG: 20 TABLET ORAL at 09:07

## 2022-07-31 RX ADMIN — TIMOLOL MALEATE 1 DROP: 5 SOLUTION OPHTHALMIC at 18:00

## 2022-07-31 RX ADMIN — FUROSEMIDE 20 MG: 20 TABLET ORAL at 09:08

## 2022-07-31 RX ADMIN — ACETAMINOPHEN 650 MG: 325 TABLET ORAL at 19:26

## 2022-07-31 RX ADMIN — ACETAMINOPHEN 650 MG: 325 TABLET ORAL at 14:12

## 2022-07-31 RX ADMIN — SODIUM CHLORIDE, PRESERVATIVE FREE 10 ML: 5 INJECTION INTRAVENOUS at 14:00

## 2022-07-31 RX ADMIN — SODIUM CHLORIDE, PRESERVATIVE FREE 10 ML: 5 INJECTION INTRAVENOUS at 22:44

## 2022-07-31 RX ADMIN — ACETAMINOPHEN 650 MG: 325 TABLET ORAL at 02:24

## 2022-07-31 RX ADMIN — POTASSIUM CHLORIDE 10 MEQ: 750 TABLET, FILM COATED, EXTENDED RELEASE ORAL at 09:07

## 2022-07-31 RX ADMIN — LOSARTAN POTASSIUM 50 MG: 50 TABLET, FILM COATED ORAL at 09:07

## 2022-07-31 RX ADMIN — FLECAINIDE ACETATE 50 MG: 50 TABLET ORAL at 22:00

## 2022-07-31 RX ADMIN — TRAMADOL HYDROCHLORIDE 50 MG: 50 TABLET ORAL at 22:41

## 2022-07-31 NOTE — PROGRESS NOTES
Problem: Mobility Impaired (Adult and Pediatric)  Goal: *Acute Goals and Plan of Care (Insert Text)  Description: FUNCTIONAL STATUS PRIOR TO ADMISSION: Patient was modified independent using a rolling walker and single point cane for functional mobility. HOME SUPPORT PRIOR TO ADMISSION: The patient lived with daughter but did not require assist and daughter is disabled and is unable to assist her. Physical Therapy Goals  Initiated 7/28/2022    1. Patient will move from supine to sit and sit to supine , scoot up and down, and roll side to side in bed with minimal assistance/contact guard assist within 4 days. 2. Patient will perform sit to stand with minimal assistance/contact guard assist within 4 days. 3. Patient will ambulate with minimal assistance/contact guard assist for 50 feet with the least restrictive device within 4 days. 4. Patient will ascend/descend 4 stairs with cane and one handrail(s) with minimal assistance/contact guard assist within 4 days. 5. Patient will perform post-SHANON home exercise program per protocol with independence within 4 days. Outcome: Progressing Towards Goal     PHYSICAL THERAPY TREATMENT  Patient: Coy Pallas (89 y.o. female)  Date: 7/31/2022  Diagnosis: Primary osteoarthritis of right hip [M16.11]  Osteoarthritis of right hip, unspecified osteoarthritis type [M16.11] <principal problem not specified>  Procedure(s) (LRB):  RIGHT TOTAL HIP ARTHROPLASTY ANTERIOR APPROACH (Right) 4 Days Post-Op  Precautions: Fall, WBAT  Chart, physical therapy assessment, plan of care and goals were reviewed. ASSESSMENT  Patient continues with skilled PT services and is progressing towards goals. Patient performed supine LE exercises and exercises written on the board. Assist needed with heel slides on the RLE. Patient continues to need mod assist with bed mobility. She is able to walk into the bathroom and sit on the commode with min assist x 1 and elevated toilet seat. Biggest limiting factor continues to be R hip pain, increasing to 8/10 while walking x 80 feet. Secondary to slow progress, pain and home setup, continue to recommend SNF rehab upon discharge. Current Level of Function Impacting Discharge (mobility/balance): decreased gait distance, mod assist x 1 with bed mobility    Other factors to consider for discharge: 4 steps at home         PLAN :  Patient continues to benefit from skilled intervention to address the above impairments. Continue treatment per established plan of care. to address goals. Recommendation for discharge: (in order for the patient to meet his/her long term goals)  Therapy up to 5 days/week in SNF setting    This discharge recommendation:  Has been made in collaboration with the attending provider and/or case management    IF patient discharges home will need the following DME: to be determined (TBD)       SUBJECTIVE:   Patient stated I'm doing okay for 80year old, but I'm slow.     OBJECTIVE DATA SUMMARY:   Critical Behavior:  Neurologic State: Appropriate for age, Alert  Orientation Level: Appropriate for age, Oriented to situation, Oriented to place, Oriented to person, Oriented to time, Oriented X4  Cognition: Follows commands, Appropriate decision making, Appropriate for age attention/concentration, Appropriate safety awareness  Safety/Judgement: Awareness of environment, Fall prevention, Insight into deficits  Functional Mobility Training:  Bed Mobility:  Supine to Sit: Moderate assistance;Assist x1    Transfers:  Sit to Stand: Contact guard assistance  Stand to Sit: Contact guard assistance  Bed to Chair: Contact guard assistance    Ambulation/Gait Training:  Distance (ft): 80 Feet (ft)  Assistive Device: Gait belt;Walker, rolling  Ambulation - Level of Assistance: Contact guard assistance; Additional time; Adaptive equipment  Gait Abnormalities: Antalgic;Decreased step clearance  Base of Support: Widened  Stance: Right decreased  Speed/Felicitas: Slow  Step Length: Right shortened;Left shortened  Swing Pattern: Right asymmetrical    Therapeutic Exercises:   SUPINE  EXERCISES   Sets   Reps   Active Active Assist   Passive Self ROM   Comments   Ankle Pumps 1 10 [x]                                        []                                        []                                        []                                           Quad Sets 1 5 [x]                                        []                                        []                                        []                                           Heel Slides 1 5 [x]                                        []                                        []                                        []                                           Hip Abduction   []                                        []                                        []                                        []                                           Glut Sets 1 5 [x]                                        []                                        []                                        []                                           Hamstring sets 1 5 [x]                                        []                                        []                                        []                                              []                                        []                                        []                                        []                                             STANDING  EXERCISES   Sets   Reps   Active Active Assist   Passive Self ROM   Comments   Heel Raises   []                                        []                                        []                                        []                                           Hip Abduction   []                                        []                                        []                                        [] []                                        []                                        []                                        []                                              []                                        []                                        []                                        []                                             Pain Rating:  Up to 8/10 with ambulation in the R hip    Activity Tolerance:   Good, SpO2 stable on RA, and requires rest breaks      After treatment patient left in no apparent distress:   Sitting in chair, Call bell within reach, and Bed / chair alarm activated    COMMUNICATION/COLLABORATION:   The patients plan of care was discussed with: Registered nurse.      Delfin Mcclure, PT, DPT  Geriatric Clinical Specialist     Time Calculation: 24 mins Zeina Espinal

## 2022-07-31 NOTE — PROGRESS NOTES
POD 4 Days Post-Op    Procedure:  Procedure(s):  RIGHT TOTAL HIP ARTHROPLASTY ANTERIOR APPROACH    Subjective:     Patient doing well, complaining of appropriate post-op pain    Objective:     Blood pressure 119/67, pulse 69, temperature 98.1 °F (36.7 °C), resp. rate 16, height 4' 11\" (1.499 m), weight 153 lb (69.4 kg), SpO2 97 %. Temp (24hrs), Av °F (36.7 °C), Min:97.8 °F (36.6 °C), Max:98.1 °F (36.7 °C)      Physical Exam:  Examination of the right hip reveals that the incision is clean, dry and intact. Sensation is intact to light touch.  mild swelling. No calf pain.   NVSI    Labs:   Lab Results   Component Value Date/Time    HGB 11.4 (L) 2022 05:17 AM         Assessment:     Active Problems:    Osteoarthritis of right hip, unspecified osteoarthritis type (2022)        Procedure(s):  RIGHT TOTAL HIP ARTHROPLASTY ANTERIOR APPROACH    Plan/Recommendations/Medical Decision Making:     - pain control - oxy vs tramadol  - ice  - pt/ot  - dvt prophylaxis - xarelto  - d/c planning - snf pending              Mary Bennett DO

## 2022-08-01 PROCEDURE — 97110 THERAPEUTIC EXERCISES: CPT

## 2022-08-01 PROCEDURE — 74011250637 HC RX REV CODE- 250/637

## 2022-08-01 PROCEDURE — 74011250637 HC RX REV CODE- 250/637: Performed by: PHYSICIAN ASSISTANT

## 2022-08-01 PROCEDURE — 97116 GAIT TRAINING THERAPY: CPT

## 2022-08-01 PROCEDURE — 97530 THERAPEUTIC ACTIVITIES: CPT

## 2022-08-01 PROCEDURE — 2709999900 HC NON-CHARGEABLE SUPPLY

## 2022-08-01 PROCEDURE — 74011250637 HC RX REV CODE- 250/637: Performed by: ORTHOPAEDIC SURGERY

## 2022-08-01 PROCEDURE — 94760 N-INVAS EAR/PLS OXIMETRY 1: CPT

## 2022-08-01 PROCEDURE — 77030038269 HC DRN EXT URIN PURWCK BARD -A

## 2022-08-01 PROCEDURE — 97535 SELF CARE MNGMENT TRAINING: CPT

## 2022-08-01 PROCEDURE — G0378 HOSPITAL OBSERVATION PER HR: HCPCS

## 2022-08-01 PROCEDURE — 74011000250 HC RX REV CODE- 250: Performed by: PHYSICIAN ASSISTANT

## 2022-08-01 RX ADMIN — ACETAMINOPHEN 650 MG: 325 TABLET ORAL at 17:21

## 2022-08-01 RX ADMIN — TIMOLOL MALEATE 1 DROP: 5 SOLUTION OPHTHALMIC at 17:22

## 2022-08-01 RX ADMIN — FAMOTIDINE 20 MG: 20 TABLET ORAL at 09:18

## 2022-08-01 RX ADMIN — SODIUM CHLORIDE, PRESERVATIVE FREE 10 ML: 5 INJECTION INTRAVENOUS at 16:37

## 2022-08-01 RX ADMIN — SODIUM CHLORIDE, PRESERVATIVE FREE 10 ML: 5 INJECTION INTRAVENOUS at 05:10

## 2022-08-01 RX ADMIN — POTASSIUM CHLORIDE 10 MEQ: 750 TABLET, FILM COATED, EXTENDED RELEASE ORAL at 09:18

## 2022-08-01 RX ADMIN — ACETAMINOPHEN 650 MG: 325 TABLET ORAL at 12:05

## 2022-08-01 RX ADMIN — PRAVASTATIN SODIUM 40 MG: 40 TABLET ORAL at 09:18

## 2022-08-01 RX ADMIN — FUROSEMIDE 20 MG: 20 TABLET ORAL at 09:18

## 2022-08-01 RX ADMIN — FLECAINIDE ACETATE 50 MG: 50 TABLET ORAL at 22:17

## 2022-08-01 RX ADMIN — TIMOLOL MALEATE 1 DROP: 5 SOLUTION OPHTHALMIC at 09:19

## 2022-08-01 RX ADMIN — RIVAROXABAN 20 MG: 20 TABLET, FILM COATED ORAL at 17:21

## 2022-08-01 RX ADMIN — LOSARTAN POTASSIUM 50 MG: 50 TABLET, FILM COATED ORAL at 09:18

## 2022-08-01 RX ADMIN — TRAMADOL HYDROCHLORIDE 50 MG: 50 TABLET ORAL at 09:18

## 2022-08-01 RX ADMIN — ACETAMINOPHEN 650 MG: 325 TABLET ORAL at 22:17

## 2022-08-01 RX ADMIN — SODIUM CHLORIDE, PRESERVATIVE FREE 10 ML: 5 INJECTION INTRAVENOUS at 22:17

## 2022-08-01 RX ADMIN — FLECAINIDE ACETATE 50 MG: 50 TABLET ORAL at 09:18

## 2022-08-01 RX ADMIN — ACETAMINOPHEN 650 MG: 325 TABLET ORAL at 05:04

## 2022-08-01 NOTE — PROGRESS NOTES
Problem: Self Care Deficits Care Plan (Adult)  Goal: *Acute Goals and Plan of Care (Insert Text)  Description: FUNCTIONAL STATUS PRIOR TO ADMISSION: Patient was independent and active without use of DME. Patient was modified independent for basic and instrumental ADLs. HOME SUPPORT: The patient lived with two daughters but did not require assist. Daughters are unable to provide assist at home. Occupational Therapy Goals  Initiated 7/28/2022; Weekly Re-assessment 8/1/2022- All goals continued below  1. Patient will perform grooming with modified independence within 4 day(s). 2.  Patient will perform bathing with modified independence within 4 day(s). 3.  Patient will perform upper body dressing and lower body dressing with modified independence within 4 day(s). 4.  Patient will perform toilet transfers with modified independence within 4 day(s). 5.  Patient will perform all aspects of toileting with modified independence within 4 day(s). 6.  Patient will participate in upper extremity therapeutic exercise/activities with modified independence for 10 minutes within 4 day(s). 7.  Patient will utilize energy conservation techniques during functional activities with verbal cues within 4 day(s). Outcome: Progressing Towards Goal    OCCUPATIONAL THERAPY TREATMENT/WEEKLY RE-ASSESSMENT  Patient: Merry Canas (73 y.o. female)  Date: 8/1/2022  Diagnosis: Primary osteoarthritis of right hip [M16.11]  Osteoarthritis of right hip, unspecified osteoarthritis type [M16.11] <principal problem not specified>  Procedure(s) (LRB):  RIGHT TOTAL HIP ARTHROPLASTY ANTERIOR APPROACH (Right) 5 Days Post-Op  Precautions: Fall, WBAT  Chart, occupational therapy assessment, plan of care, and goals were reviewed. ASSESSMENT  Patient continues with skilled OT services and is progressing towards goals.   Pt progressing with functional independence, evidenced by progression to SBA for toileting at [address] [place] [address], as well as, completing LB bathing/dressing with Min A for distal management. Goals continued at their current levels as she continues to demonstrate decreased higher level mobility/balance and activity tolerance, as well as, benefiting from cues for safe transfer technique, modified dressing techniques and energy conservation. Pt highly pleasant and agreeable with all presented challenges, with strong desires to increase safe functional independence and reporting therapist believing pt would benefit from SNF rehab upon discharge. Acute OT to continue to follow during acute hospitalization. Current Level of Function Impacting Discharge (ADLs): Min A    Other factors to consider for discharge: Advanced age         PLAN :  Goals have been updated based on progression since last assessment. Patient continues to benefit from skilled intervention to address the above impairments. Continue to follow patient 5 times a week to address goals. Recommend with staff: OOB meals, Active ADL engagement    Recommend next OT session: POC progression    Recommendation for discharge: (in order for the patient to meet his/her long term goals)  Therapy up to 5 days/week in SNF setting    This discharge recommendation:  Has been made in collaboration with the attending provider and/or case management    IF patient discharges home will need the following DME: TBD       SUBJECTIVE:   Patient stated I appreciate your help, you did a good job.     OBJECTIVE DATA SUMMARY:   Cognitive/Behavioral Status:  Neurologic State: Alert  Orientation Level: Oriented X4  Cognition: Appropriate for age attention/concentration  Perception: Appears intact  Perseveration: No perseveration noted  Safety/Judgement: Awareness of environment    Functional Mobility and Transfers for ADLs:  Transfers:  Sit to Stand: Stand-by assistance  Functional Transfers  Toilet Transfer : Stand-by assistance  Bed to Chair: Stand-by assistance    Balance:  Sitting: Intact  Standing: Impaired; With support  Standing - Static: Good;Constant support  Standing - Dynamic : Good;Fair;Constant support    ADL Intervention:  Grooming  Grooming Assistance: Stand-by assistance  Position Performed: Standing (RW)  Washing Hands: Stand-by assistance    Lower Body Dressing Assistance  Underpants: Minimum assistance (for distal management)    Toileting  Toileting Assistance: Stand-by assistance  Bladder Hygiene: Modified independent  Clothing Management: Stand-by assistance (for proximal management)  Cues: Verbal cues provided (for stepping RLE forward prior to sitting)  Adaptive Equipment: Walker;Elevated seat    Cognitive Retraining  Safety/Judgement: Awareness of environment         Therapeutic Exercises:        Completed BUE strengthening HEP, seated EOB, challenging functional strength/endurance required for increased ADL independence at RW level, with pt completing 1 sets of 5 reps of biceps flexion and triceps extension, against red theraband resistance, with Max verbal and Min tactile cues for technique (e.g. triceps extension above heart level with slow return, elbow adducted to body during biceps flexion and driving movement through elbows rather than hands during latissimus nicolas pulls), as well as, cues for proper pacing and breathing. Pain:  No c/o pain    Activity Tolerance:   Good, Fair, and requires rest breaks    After treatment patient left in no apparent distress:   Sitting in chair, Call bell within reach, and Bed / chair alarm activated    COMMUNICATION/COLLABORATION:   The patients plan of care was discussed with: Physical therapist, Registered nurse, and Case management.      [place] OT  Time Calculation: 27 mins

## 2022-08-01 NOTE — PROGRESS NOTES
Problem: Mobility Impaired (Adult and Pediatric)  Goal: *Acute Goals and Plan of Care (Insert Text)  Description: FUNCTIONAL STATUS PRIOR TO ADMISSION: Patient was modified independent using a rolling walker and single point cane for functional mobility. HOME SUPPORT PRIOR TO ADMISSION: The patient lived with daughter but did not require assist and daughter is disabled and is unable to assist her. Physical Therapy Goals  Initiated 7/28/2022    1. Patient will move from supine to sit and sit to supine , scoot up and down, and roll side to side in bed with minimal assistance/contact guard assist within 4 days. 2. Patient will perform sit to stand with minimal assistance/contact guard assist within 4 days. 3. Patient will ambulate with minimal assistance/contact guard assist for 50 feet with the least restrictive device within 4 days. 4. Patient will ascend/descend 4 stairs with cane and one handrail(s) with minimal assistance/contact guard assist within 4 days. 5. Patient will perform post-SHANON home exercise program per protocol with independence within 4 days. Outcome: Progressing Towards Goal   PHYSICAL THERAPY TREATMENT  Patient: Denia Macias (78 y.o. female)  Date: 8/1/2022  Diagnosis: Primary osteoarthritis of right hip [M16.11]  Osteoarthritis of right hip, unspecified osteoarthritis type [M16.11] <principal problem not specified>  Procedure(s) (LRB):  RIGHT TOTAL HIP ARTHROPLASTY ANTERIOR APPROACH (Right) 5 Days Post-Op  Precautions: Fall, WBAT  Chart, physical therapy assessment, plan of care and goals were reviewed. ASSESSMENT  Patient continues with skilled PT services and is progressing towards goals. Overall activity tolerance increasing - now up in chair most of day and walking to bathroom with assist.  Pt motivated to increase independence - recommend rehab. Current Level of Function Impacting Discharge (mobility/balance):  Mod assist supine to sit; CGA for transfers/amb with RW; fall risk    Other factors to consider for discharge: Pt's daughter lives with pt, however d/t medical problems unable to physically assist pt         PLAN :  Patient continues to benefit from skilled intervention to address the above impairments. Continue treatment per established plan of care. to address goals. Recommendation for discharge: (in order for the patient to meet his/her long term goals)  Therapy up to 5 days/week in SNF setting    This discharge recommendation:  Has been made in collaboration with the attending provider and/or case management    IF patient discharges home will need the following DME: patient owns DME required for discharge       SUBJECTIVE:   Patient stated I don't think I will have to stay in rehab long.     OBJECTIVE DATA SUMMARY:   Critical Behavior:  Neurologic State: Alert  Orientation Level: Oriented X4  Cognition: Appropriate for age attention/concentration  Safety/Judgement: Awareness of environment  Functional Mobility Training:  Bed Mobility:     Supine to Sit: Moderate assistance;Assist x1;Additional time  Sit to Supine: Other (comment) (pt returned to chair)  Scooting: Supervision (scooting to edge of bed)        Transfers:  Sit to Stand: Stand-by assistance  Stand to Sit: Stand-by assistance        Bed to Chair: Stand-by assistance                    Balance:  Sitting: Intact  Standing: Impaired; With support  Standing - Static: Good;Constant support  Standing - Dynamic : Fair;Constant support (limited trunk excursion - requires bilateral support of RW)  Ambulation/Gait Training:  Distance (ft): 80 Feet (ft)  Assistive Device: Walker, rolling;Gait belt  Ambulation - Level of Assistance: Contact guard assistance        Gait Abnormalities: Decreased step clearance  Right Side Weight Bearing: As tolerated     Base of Support: Widened  Stance: Right decreased  Speed/Felicitas: Slow  Step Length: Right shortened;Left shortened  Swing Pattern: Right asymmetrical Interventions: Safety awareness training;Verbal cues     Pain Rating:  Right hip 2/10    Activity Tolerance:   Good - tolerates up in chair most of day - amb to bathroom with assist    After treatment patient left in no apparent distress:   Sitting in chair and Call bell within reach    COMMUNICATION/COLLABORATION:   The patients plan of care was discussed with: Registered nurse.      Vielka Verma, PT   Time Calculation: 25 mins

## 2022-08-01 NOTE — PROGRESS NOTES
POD 5 Days Post-Op    Procedure:  Procedure(s):  RIGHT TOTAL HIP ARTHROPLASTY ANTERIOR APPROACH    Subjective:     Patient doing well, complaining of appropriate post-op pain. Resting comfortably. Eating well. Objective:     Blood pressure 124/66, pulse 73, temperature 97.9 °F (36.6 °C), resp. rate 18, height 4' 11\" (1.499 m), weight 153 lb (69.4 kg), SpO2 98 %. Temp (24hrs), Av.3 °F (36.8 °C), Min:97.9 °F (36.6 °C), Max:99.3 °F (37.4 °C)      Physical Exam:  Examination of the right hip reveals that the incision is clean, dry and intact. Sensation is intact to light touch.  mild swelling. No calf pain. NVSI    Labs:   Lab Results   Component Value Date/Time    HGB 11.4 (L) 2022 05:17 AM         Assessment:     Active Problems:    Osteoarthritis of right hip, unspecified osteoarthritis type (2022)      Procedure(s):  RIGHT TOTAL HIP ARTHROPLASTY ANTERIOR APPROACH    Plan/Recommendations/Medical Decision Making:     - pain control - oxy vs tramadol  - ice  - pt/ot  - dvt prophylaxis - xarelto  - d/c planning - snf pending. Goal for dispo today if bed available.

## 2022-08-01 NOTE — PROGRESS NOTES
Transition Of Care: Lawrence Sher pending for SNF. Reference # [id number]. BLS to transport. CM called Jose and updated them that [facility]st choice [facility] has accepted. CM called Alana larose at [facility] to check to see if bed available if auth granted today. Covid rapid completed 7/30. RUR: N/A    1st choice: [facility] - (accepted)  2nd choice: [facility] -pending  3rd choice: [facility]  (accepted)    CM following for discharge needs.     Rod Sprague RN/CRM

## 2022-08-02 VITALS
WEIGHT: 153 LBS | HEIGHT: 59 IN | OXYGEN SATURATION: 99 % | DIASTOLIC BLOOD PRESSURE: 58 MMHG | TEMPERATURE: 98.8 F | SYSTOLIC BLOOD PRESSURE: 98 MMHG | RESPIRATION RATE: 16 BRPM | HEART RATE: 69 BPM | BODY MASS INDEX: 30.84 KG/M2

## 2022-08-02 LAB
COVID-19 RAPID TEST, COVR: NOT DETECTED
SOURCE, COVRS: NORMAL

## 2022-08-02 PROCEDURE — 74011250637 HC RX REV CODE- 250/637: Performed by: ORTHOPAEDIC SURGERY

## 2022-08-02 PROCEDURE — 74011250637 HC RX REV CODE- 250/637: Performed by: PHYSICIAN ASSISTANT

## 2022-08-02 PROCEDURE — 97116 GAIT TRAINING THERAPY: CPT

## 2022-08-02 PROCEDURE — 74011000250 HC RX REV CODE- 250: Performed by: PHYSICIAN ASSISTANT

## 2022-08-02 PROCEDURE — 97530 THERAPEUTIC ACTIVITIES: CPT

## 2022-08-02 PROCEDURE — G0378 HOSPITAL OBSERVATION PER HR: HCPCS

## 2022-08-02 PROCEDURE — 87635 SARS-COV-2 COVID-19 AMP PRB: CPT

## 2022-08-02 RX ADMIN — ACETAMINOPHEN 650 MG: 325 TABLET ORAL at 05:25

## 2022-08-02 RX ADMIN — LOSARTAN POTASSIUM 50 MG: 50 TABLET, FILM COATED ORAL at 08:20

## 2022-08-02 RX ADMIN — SODIUM CHLORIDE, PRESERVATIVE FREE 10 ML: 5 INJECTION INTRAVENOUS at 05:26

## 2022-08-02 RX ADMIN — PRAVASTATIN SODIUM 40 MG: 40 TABLET ORAL at 08:19

## 2022-08-02 RX ADMIN — FLECAINIDE ACETATE 50 MG: 50 TABLET ORAL at 08:19

## 2022-08-02 RX ADMIN — TIMOLOL MALEATE 1 DROP: 5 SOLUTION OPHTHALMIC at 08:20

## 2022-08-02 RX ADMIN — FUROSEMIDE 20 MG: 20 TABLET ORAL at 08:19

## 2022-08-02 RX ADMIN — TRAMADOL HYDROCHLORIDE 50 MG: 50 TABLET ORAL at 14:08

## 2022-08-02 RX ADMIN — FAMOTIDINE 20 MG: 20 TABLET ORAL at 08:19

## 2022-08-02 RX ADMIN — POTASSIUM CHLORIDE 10 MEQ: 750 TABLET, FILM COATED, EXTENDED RELEASE ORAL at 08:19

## 2022-08-02 RX ADMIN — DOCUSATE SODIUM 50MG AND SENNOSIDES 8.6MG 1 TABLET: 8.6; 5 TABLET, FILM COATED ORAL at 08:20

## 2022-08-02 RX ADMIN — POLYETHYLENE GLYCOL 3350 17 G: 17 POWDER, FOR SOLUTION ORAL at 08:19

## 2022-08-02 RX ADMIN — ACETAMINOPHEN 650 MG: 325 TABLET ORAL at 11:44

## 2022-08-02 NOTE — PROGRESS NOTES
2000 Received patient from [facility].    1533 Bedside shift change report given to Pamela Wells RN (oncoming nurse) by Angela Trevino RN. Report included the following information SBAR, Kardex, MAR, and Recent Results. Problem: Falls - Risk of  Goal: *Absence of Falls  Description: Document Delfino Ballesteros Fall Risk and appropriate interventions in the flowsheet.   Outcome: Progressing Towards Goal  Note: Fall Risk Interventions:  Mobility Interventions: Patient to call before getting OOB         Medication Interventions: Patient to call before getting OOB    Elimination Interventions: Call light in reach              Problem: Hypertension  Goal: *Blood pressure within specified parameters  Outcome: Progressing Towards Goal  Goal: *Fluid volume balance  Outcome: Progressing Towards Goal  Goal: *Labs within defined limits  Outcome: Progressing Towards Goal No

## 2022-08-02 NOTE — PROGRESS NOTES
Bedside and Verbal shift change report given to [facility] (oncoming nurse) by Barbara Sosa (offgoing nurse). Report included the following information SBAR, Kardex, Intake/Output, MAR, and Recent Results.

## 2022-08-02 NOTE — PROGRESS NOTES
Transition Of Care: The patient plans to discharge to [facility] SNF today with [facility] daniel at [address] granted with [facility]. Auth # [id number]. Care coordinator: Mitul Moscoso ([phone]). Auth good from 8/1 through 8/3. Rapid covid completed. The patient is going to room 44-B. RN to call report to 4 [address] [place] [address]. RUR: N/A    CM called and spoke with liaison, Elder Garcia ([phone]) at [facility] and she has accepted the patient today. CM called and notified the patient's daughter, Elliot Giordano ([phone]) of transport time. CM offered screening for Medicaid Long-Term Services & Supports. Patient Declined screening. Transition of Care Plan to SNF/Rehab    SNF/Rehab Transition:  Patient has been accepted to [facility] and meets criteria for admission. Patient will transported by [facility] and expected to leave at 2pm.    Communication to Patient/Family:  Met with patient and Elliot Giordano (identified care giver) and they are agreeable to the transition plan. Communication to SNF/Rehab:  Bedside RN, Gibran Osei, has been notified to update the transition plan to the facility and call report (phone number [phone]). Discharge information has been updated on the AVS.       Nursing Please include all hard scripts for controlled substances, med rec and dc summary, and AVS in packet.      Reviewed and confirmed with facility, Elder Garcia, can manage the patient care needs for the following:     Reviewed and confirmed with facility, Elder Garcia they can manage the patient care needs for the following:     Sherald Spurling with (X) only those applicable:    Medication:  []  Medications will be available at the facility  []  IV Antibiotics   []  Controlled Substance - hard copy to be sent with patient   []  Weekly Labs   Documents:  [x] Hard RX  [x] MAR  [x] Kardex  [x] AVS  [x]Transfer Summary  [x]Discharge   Equipment:  []  CPAP/BiPAP  []  Wound Vacuum  []  Hassan or Urinary Device  []  PICC/Central Line  []  Nebulizer  [] Ventilator   Treatment:  []Isolation (for MRSA, VRE, etc.)  []Surgical Drain Management  []Tracheostomy Care  []Dressing Changes  []Dialysis with transportation and chair time   []PEG Care  []Oxygen  []Daily Weights for Heart Failure   Dietary:  []Any diet limitations  []Tube Feedings   []Total Parenteral Management (TPN)   Eligible for Medicaid Long Term Services and Supports  Yes:  [] Eligible for medical assistance or will become eligible within 180 days and UAI completed. [] Provider/Patient and/or support system has requested screening. [] UAI copy provided to patient or responsible party. [] UAI unavailable at discharge will send once processed to SNF provider. [] UAI unavailable at discharged mailed to patient  No:   [] Private pay and is not financially eligible for Medicaid within the next 180 days. [] Reside out-of-state. [] A residents of a state owned/operated facility that is licensed  by [place] [address] [employer] or [facility]  [] Enrollment in [facility] hospice services  [] [address] [place] [address]  [] Patient /Family declines to have screening completed or provide financial information for screening     Financial Resources:  Medicaid    [] Initiated and application pending   [] Full coverage     Advanced Care Plan:  []Surrogate Decision Maker of Care  []POA  []Communicated Code Status full(DDNR\", \"Full\")    Other      CM following for discharge needs.     Elvira Lopes RN/CRM

## 2022-08-02 NOTE — DISCHARGE SUMMARY
Ortho Discharge Summary    Patient ID:  Nirmala Beach  [id number]  female  80 y.o.  3/21/1930    Admit date: 7/27/2022    Discharge date: 8/2/2022    Admitting Physician: Juan Truong MD     Consulting Physician(s):   Treatment Team: Attending Provider: Johny Anaya MD; Utilization Review: Kai Padgett RN; Care Manager: Lilian Rodriguez, RN; Staff Nurse: Emily Jesus RN; Staff Nurse: Evi Gibson RN; Physical Therapist: Macy Moreland PT; Occupational Therapist: Monika Simental OT    Date of Surgery:   7/27/2022     Preoperative Diagnosis:  Primary osteoarthritis of right hip [M16.11]    Postoperative Diagnosis:   Primary osteoarthritis of right hip [M16.11]    Procedure(s):   RIGHT TOTAL HIP ARTHROPLASTY ANTERIOR APPROACH     Anesthesia Type:   Spinal     Surgeon: Johny Anaya MD                            HPI:  Pt is a 80 y.o. female who has a history of Primary osteoarthritis of right hip [M16.11]  with pain and limitations of activities of daily living who presents at this time for a right RIGHT TOTAL HIP ARTHROPLASTY ANTERIOR APPROACH following the failure of conservative management. PMH:   Past Medical History:   Diagnosis Date    A-fib ([facility] [address]     Arthritis     Cancer ([facility] [address]     STOMACH    Hypertension     PUD (peptic ulcer disease)        Body mass index is 30.9 kg/m². : A BMI > 30 is classified as obesity and > 40 is classified as morbid obesity. Medications upon admission :   Prior to Admission Medications   Prescriptions Last Dose Informant Patient Reported? Taking? Xarelto 20 mg tab tablet 7/24/2022  Yes No   Sig: TAKE 1 TABLET BY MOUTH EVERY DAY WITH EVENING MEAL   acetaminophen (TYLENOL) 500 mg tablet 7/26/2022  Yes Yes   Sig: Take  by mouth every six (6) hours as needed for Pain. flecainide (TAMBOCOR) 50 mg tablet 7/27/2022 at 0800  Yes Yes   Sig: Take 50 mg by mouth every twelve (12) hours.    furosemide (LASIX) 40 mg tablet 7/25/2022  Yes Yes   Sig: Take 20 mg by mouth in the morning. losartan (COZAAR) 50 mg tablet 7/26/2022  Yes Yes   potassium chloride SA (MICRO-K) 10 mEq capsule 7/26/2022  Yes Yes   pravastatin (PRAVACHOL) 40 mg tablet 7/26/2022  Yes Yes   timolol (TIMOPTIC) 0.5 % ophthalmic solution 7/27/2022 at 0800  Yes Yes   Sig: INSTILL 1 DROP INTO BOTH EYES TWICE A DAY      Facility-Administered Medications: None        Allergies: Allergies   Allergen Reactions    Aleve [Naproxen Sodium] Unknown (comments)        Hospital Course: The patient underwent surgery. Complications:  None; patient tolerated the procedure well. Was taken to the PACU in stable condition and then transferred to the ortho floor. Patient mobilized by nursing on day of surgery. Post operative pain control achieved using primarily tylenol and as needed tramadol. Therapy ongoing throughout hospitalization. However, due to advanced age, limited home support, and second floor bedroom, patient expressed desire for placement at facility for ongoing rehab. Case management involved with referrals sent on POD 1. Accepted at [facility] and Rehab with authorization obtained on POD 6. Transferred to facility on POD 6 for ongoing rehabilitation prior to return to private residence. Perioperative Antibiotics:  Ancef     Postoperative Pain Management:  Tramadol & Tylenol     DVT Prophylaxis: Xarelto daily as prescribed    Postoperative transfusions:    Number of units banked PRBCs =   none     Post Op complications: none    Hemoglobin at discharge:    Lab Results   Component Value Date/Time    HGB 11.4 (L) 07/28/2022 05:17 AM    INR 1.1 07/21/2022 12:12 PM       Aquacel dressing remained in place, scant midline drainage but seal intact. No significant erythema or swelling. Wound appears to be healing without any evidence of infection. Neurovascular exam found to be within normal limits.      Physical Therapy started following surgery and participated in bed mobility, transfers and ambulation. Gait:  Gait  Base of Support: Widened  Speed/Felicitas: Slow  Step Length: Right shortened, Left shortened  Swing Pattern: Right asymmetrical  Stance: Right decreased  Gait Abnormalities: Decreased step clearance  Ambulation - Level of Assistance: Contact guard assistance  Distance (ft): 80 Feet (ft)  Assistive Device: Walker, rolling, Gait belt  Interventions: Safety awareness training, Verbal cues            Interventions: Safety awareness training, Verbal cues      Discharged to: SNF. Condition on Discharge:   stable    Discharge instructions:  - Anticoagulate with Xarelto daily  - Take pain medications as prescribed  - Resume pre hospital diet      - Discharge activity: activity as tolerated  - Ambulate with assistive device as needed. - Weight bearing status as tolerated  - Wound Care Keep wound clean and dry. See discharge instruction sheet. - Remove Aquacel 7 days after surgery (date of Surgery: 7/27)            -DISCHARGE MEDICATION LIST     Current Discharge Medication List        START taking these medications    Details   traMADoL (ULTRAM) 50 mg tablet Take 1 Tablet by mouth every six (6) hours as needed for Pain for up to 7 days. Max Daily Amount: 200 mg. Qty: 42 Tablet, Refills: 0  Start date: 7/28/2022, End date: 8/4/2022    Associated Diagnoses: S/P total right hip arthroplasty      senna-docusate (PERICOLACE) 8.6-50 mg per tablet Take 1 Tablet by mouth two (2) times a day for 30 days. Qty: 60 Tablet, Refills: 0  Start date: 7/28/2022, End date: 8/27/2022           CONTINUE these medications which have CHANGED    Details   acetaminophen (TYLENOL) 325 mg tablet Take 2 Tablets by mouth every six (6) hours.   Qty: 100 Tablet, Refills: 0  Start date: 7/28/2022           CONTINUE these medications which have NOT CHANGED    Details   pravastatin (PRAVACHOL) 40 mg tablet       potassium chloride SA (MICRO-K) 10 mEq capsule       losartan (COZAAR) 50 mg tablet       flecainide (TAMBOCOR) 50 mg tablet Take 50 mg by mouth every twelve (12) hours. timolol (TIMOPTIC) 0.5 % ophthalmic solution INSTILL 1 DROP INTO BOTH EYES TWICE A DAY      furosemide (LASIX) 40 mg tablet Take 20 mg by mouth in the morning.       Xarelto 20 mg tab tablet TAKE 1 TABLET BY MOUTH EVERY DAY WITH EVENING MEAL          per medical continuation form      -Follow up in office in 3-4 weeks after surgery date with Dr. Gracie Rich      Signed:  Karen Bethea NP  Orthopaedic Nurse Practitioner    8/2/2022  9:58 AM

## 2022-08-02 NOTE — PROGRESS NOTES
POD 6 Days Post-Op    Procedure:  Procedure(s):  RIGHT TOTAL HIP ARTHROPLASTY ANTERIOR APPROACH    Subjective:     Patient doing well, complaining of appropriate post-op pain. Resting comfortably. Eating well. No acute events. Objective:     Blood pressure (!) 148/72, pulse 65, temperature 98.1 °F (36.7 °C), resp. rate 16, height 4' 11\" (1.499 m), weight 153 lb (69.4 kg), SpO2 99 %. Temp (24hrs), Av °F (36.7 °C), Min:97.8 °F (36.6 °C), Max:98.2 °F (36.8 °C)      Physical Exam:  Examination of the right hip reveals that the incision is clean, dry and intact. Sensation is intact to light touch.  mild swelling. No calf pain. NVSI    Labs:   Lab Results   Component Value Date/Time    HGB 11.4 (L) 2022 05:17 AM         Assessment:     Active Problems:    Osteoarthritis of right hip, unspecified osteoarthritis type (2022)      Procedure(s):  RIGHT TOTAL HIP ARTHROPLASTY ANTERIOR APPROACH    Plan/Recommendations/Medical Decision Making:     - pain control - oxy vs tramadol  - ice  - pt/ot  - dvt prophylaxis - xarelto  - d/c planning - snf pending. Goal for dispo today if bed available/authorization.

## 2022-08-02 NOTE — PROGRESS NOTES
Problem: Mobility Impaired (Adult and Pediatric)  Goal: *Acute Goals and Plan of Care (Insert Text)  Description: FUNCTIONAL STATUS PRIOR TO ADMISSION: Patient was modified independent using a rolling walker and single point cane for functional mobility. HOME SUPPORT PRIOR TO ADMISSION: The patient lived with daughter but did not require assist and daughter is disabled and is unable to assist her. Physical Therapy Goals  Initiated 7/28/2022    1. Patient will move from supine to sit and sit to supine , scoot up and down, and roll side to side in bed with minimal assistance/contact guard assist within 4 days. 2. Patient will perform sit to stand with minimal assistance/contact guard assist within 4 days. 3. Patient will ambulate with minimal assistance/contact guard assist for 50 feet with the least restrictive device within 4 days. 4. Patient will ascend/descend 4 stairs with cane and one handrail(s) with minimal assistance/contact guard assist within 4 days. 5. Patient will perform post-SHANON home exercise program per protocol with independence within 4 days. Outcome: Progressing Towards Goal   PHYSICAL THERAPY TREATMENT  Patient: Merry Canas (50 y.o. female)  Date: 8/2/2022  Diagnosis: Primary osteoarthritis of right hip [M16.11]  Osteoarthritis of right hip, unspecified osteoarthritis type [M16.11] <principal problem not specified>  Procedure(s) (LRB):  RIGHT TOTAL HIP ARTHROPLASTY ANTERIOR APPROACH (Right) 6 Days Post-Op  Precautions: Fall, WBAT  Chart, physical therapy assessment, plan of care and goals were reviewed. ASSESSMENT  Patient continues with skilled PT services and is progressing towards goals. Pt continues to require Tramadol for right hip pain - especially after activity. Pt making steady progress with PT. Pt will need to progress to stairs at rehab.      Current Level of Function Impacting Discharge (mobility/balance): Right hip pain, decreased activity tolerance, fall risk    Other factors to consider for discharge: lives alone, steps to enter home         PLAN :  Patient continues to benefit from skilled intervention to address the above impairments. Continue treatment per established plan of care. to address goals. Recommendation for discharge: (in order for the patient to meet his/her long term goals)  Therapy up to 5 days/week in SNF setting    This discharge recommendation:  Has been made in collaboration with the attending provider and/or case management    IF patient discharges home will need the following DME: patient owns DME required for discharge       SUBJECTIVE:   Patient stated I'm going to rehab today.     OBJECTIVE DATA SUMMARY:   Critical Behavior:  Neurologic State: Alert  Orientation Level: Oriented X4  Cognition: Appropriate for age attention/concentration  Safety/Judgement: Awareness of environment  Functional Mobility Training:  Bed Mobility:     Supine to Sit: Other (comment) (Pt up in bedside chair and returned to chair)              Transfers:  Sit to Stand: Stand-by assistance  Stand to Sit: Stand-by assistance        Bed to Chair: Stand-by assistance                    Balance:  Sitting: Intact  Standing: Impaired; With support  Standing - Static: Good;Constant support  Standing - Dynamic : Constant support; Fair (Limited dynamic balance d/t requies bilateral support of RW at all times)  Ambulation/Gait Training:  Distance (ft): 80 Feet (ft)  Assistive Device: Walker, rolling;Gait belt  Ambulation - Level of Assistance: Contact guard assistance        Gait Abnormalities: Decreased step clearance (flexed at hips - able to correct with cues)  Right Side Weight Bearing: As tolerated     Base of Support: Widened;Shift to left  Stance: Right decreased  Speed/Felicitas: Slow  Step Length: Right shortened;Left shortened  Swing Pattern: Right asymmetrical     Interventions: Safety awareness training;Verbal cues  Pain Ratin/10 after transfers, amb to bathroom and short distance hallway    Activity Tolerance:   Improving - requires intermittent rest, good OOB tolerance    After treatment patient left in no apparent distress:   Sitting in chair and Call bell within reach    COMMUNICATION/COLLABORATION:   The patients plan of care was discussed with: Registered nurse.      Ltety Weir, PT   Time Calculation: 25 mins

## 2022-08-22 ENCOUNTER — OFFICE VISIT (OUTPATIENT)
Dept: ORTHOPEDIC SURGERY | Age: 87
End: 2022-08-22
Payer: MEDICARE

## 2022-08-22 VITALS — WEIGHT: 153 LBS | BODY MASS INDEX: 30.84 KG/M2 | HEIGHT: 59 IN

## 2022-08-22 DIAGNOSIS — Z96.641 STATUS POST RIGHT HIP REPLACEMENT: Primary | ICD-10-CM

## 2022-08-22 PROCEDURE — 99024 POSTOP FOLLOW-UP VISIT: CPT | Performed by: ORTHOPAEDIC SURGERY

## 2022-08-22 NOTE — PROGRESS NOTES
Hi Mccallum (: 3/21/1930) is a 80 y.o. female patient, here for evaluation of the following chief complaint(s):  Surgical Follow-up (Right hip follow up/)       ASSESSMENT/PLAN:  Below is the assessment and plan developed based on review of pertinent history, physical exam, labs, studies, and medications. Radiographs reviewed including 2 views of the right hip. Status post hip arthroplasty. No evidence of aseptic loosening. Leg lengths and offset are appropriate. Status post right hip arthroplasty 2022. The patient is doing well and they are happy with progress. Incision healing well. I would like to see them back in 6 weeks. Patient seen in conjunction with Dr. Will Stephenson today. 1. Status post right hip replacement  -     XR HIP RT W OR WO PELV 2-3 VWS; Future      Encounter Diagnosis   Name Primary? Status post right hip replacement Yes        No follow-ups on file. SUBJECTIVE/OBJECTIVE:  Hi Mccallum (: 3/21/1930) is a 80 y.o. female who presents today for the following:  Chief Complaint   Patient presents with    Surgical Follow-up     Right hip follow up         80-year-old female comes in today for follow-up. She status post right total hip replacement. Postoperatively she is doing very well. No pain. Working well with physical therapy. Walking with a walker which is close to baseline. Overall she is extremely happy and pleased with her progress and outcomes thus far. IMAGING:  XR Results (most recent):  Results from Appointment encounter on 22    XR HIP RT W OR WO PELV 2-3 VWS    Narrative  Radiographs reviewed including 2 views of the right hip. Status post hip arthroplasty. No evidence of aseptic loosening. Leg lengths and offset are appropriate.        Allergies   Allergen Reactions    Aleve [Naproxen Sodium] Unknown (comments)       Current Outpatient Medications   Medication Sig    senna-docusate (PERICOLACE) 8.6-50 mg per tablet Take 1 Tablet by mouth two (2) times a day for 30 days. acetaminophen (TYLENOL) 325 mg tablet Take 2 Tablets by mouth every six (6) hours. pravastatin (PRAVACHOL) 40 mg tablet     potassium chloride SA (MICRO-K) 10 mEq capsule     losartan (COZAAR) 50 mg tablet     flecainide (TAMBOCOR) 50 mg tablet Take 50 mg by mouth every twelve (12) hours. timolol (TIMOPTIC) 0.5 % ophthalmic solution INSTILL 1 DROP INTO BOTH EYES TWICE A DAY    Xarelto 20 mg tab tablet TAKE 1 TABLET BY MOUTH EVERY DAY WITH EVENING MEAL    furosemide (LASIX) 40 mg tablet Take 20 mg by mouth in the morning. No current facility-administered medications for this visit. Past Medical History:   Diagnosis Date    A-fib ([facility] [address]     Arthritis     Cancer ([facility] [address]     STOMACH    Hypertension     PUD (peptic ulcer disease)         Past Surgical History:   Procedure Laterality Date    HX HEENT Right 2013    RESTORED VISION    HX HYSTERECTOMY  1971    IN ABDOMEN SURGERY PROC UNLISTED  2016    REMOVED CANCER FROM STOMACH       Family History   Problem Relation Age of Onset    Cancer Mother     Diabetes Sister     Cancer Sister     Hypertension Son     Diabetes Son     Cancer Son     Headache Daughter     Anesth Problems Neg Hx         Social History     Tobacco Use    Smoking status: Never    Smokeless tobacco: Never   Substance Use Topics    Alcohol use: Not Currently        All systems reviewed x 12 and were negative with the exception of None      No flowsheet data found. Vitals:  Ht 4' 11\" (1.499 m)   Wt 153 lb (69.4 kg)   BMI 30.90 kg/m²    Body mass index is 30.9 kg/m². Physical Exam    General: NAD    Cardiac: Extremities well perfused. Respiratory: Nonlabored breathing. RLE: Incision clean dry and intact with no erythema. Minimal numbness over the LFCN. Normal gait and station. Negative stinchfield. No pain with gentle internal and external rotation. .  Motor strength is grossly intact. Skin warm well perfused.  Capillary refill <2 sec. Ashley Avelar M.D. was available for immediate consultation as the supervising physician. An electronic signature was used to authenticate this note.   -- Dustin Vinson PA-C

## 2022-10-07 ENCOUNTER — OFFICE VISIT (OUTPATIENT)
Dept: ORTHOPEDIC SURGERY | Age: 87
End: 2022-10-07
Payer: MEDICARE

## 2022-10-07 VITALS — BODY MASS INDEX: 30.84 KG/M2 | WEIGHT: 153 LBS | HEIGHT: 59 IN

## 2022-10-07 DIAGNOSIS — Z98.890 STATUS POST HIP SURGERY: Primary | ICD-10-CM

## 2022-10-07 PROCEDURE — 99024 POSTOP FOLLOW-UP VISIT: CPT | Performed by: ORTHOPAEDIC SURGERY

## 2022-10-07 NOTE — PROGRESS NOTES
Bola Adam (: 3/21/1930) is a 80 y.o. female patient, here for evaluation of the following chief complaint(s):  Surgical Follow-up (Right hip follow up/)       ASSESSMENT/PLAN:  Below is the assessment and plan developed based on review of pertinent history, physical exam, labs, studies, and medications. Radiographs reviewed including 2 views of the right hip from previous visit. Status post hip arthroplasty. No evidence of aseptic loosening. Leg lengths and offset are appropriate. Status post right hip arthroplasty on 2022. The patient is doing well and they are happy with progress. No pain, continues with in-home physical therapy. Doing extremely well. Very happy. Incision well-healed. Plans to follow-up at 1 year jesse or sooner as needed. Patient seen in conjunction with Dr. Rian King today. 1. Status post hip surgery      Encounter Diagnosis   Name Primary? Status post hip surgery Yes        No follow-ups on file. SUBJECTIVE/OBJECTIVE:  Bola Adam (: 3/21/1930) is a 80 y.o. female who presents today for the following:  Chief Complaint   Patient presents with    Surgical Follow-up     Right hip follow up         55-year-old female comes in today for follow-up. She status post right total hip replacement on 2022. Post operatively patient doing very well. Incision well-healed. She is feeling great. IMAGING:  XR Results (most recent):  Results from Appointment encounter on 22    XR HIP RT W OR WO PELV 2-3 VWS    Narrative  Radiographs reviewed including 2 views of the right hip. Status post hip arthroplasty. No evidence of aseptic loosening. Leg lengths and offset are appropriate. Allergies   Allergen Reactions    Aleve [Naproxen Sodium] Unknown (comments)       Current Outpatient Medications   Medication Sig    acetaminophen (TYLENOL) 325 mg tablet Take 2 Tablets by mouth every six (6) hours.     pravastatin (PRAVACHOL) 40 mg tablet potassium chloride SA (MICRO-K) 10 mEq capsule     losartan (COZAAR) 50 mg tablet     flecainide (TAMBOCOR) 50 mg tablet Take 50 mg by mouth every twelve (12) hours. timolol (TIMOPTIC) 0.5 % ophthalmic solution INSTILL 1 DROP INTO BOTH EYES TWICE A DAY    Xarelto 20 mg tab tablet TAKE 1 TABLET BY MOUTH EVERY DAY WITH EVENING MEAL    furosemide (LASIX) 40 mg tablet Take 20 mg by mouth in the morning. No current facility-administered medications for this visit. Past Medical History:   Diagnosis Date    A-fib ([facility]     Arthritis     Cancer ([facility] [address]     STOMACH    Hypertension     PUD (peptic ulcer disease)         Past Surgical History:   Procedure Laterality Date    HX HEENT Right 2013    RESTORED VISION    HX HYSTERECTOMY  1971    NM ABDOMEN SURGERY PROC UNLISTED  2016    REMOVED CANCER FROM STOMACH       Family History   Problem Relation Age of Onset    Cancer Mother     Diabetes Sister     Cancer Sister     Hypertension Son     Diabetes Son     Cancer Son     Headache Daughter     Anesth Problems Neg Hx         Social History     Tobacco Use    Smoking status: Never    Smokeless tobacco: Never   Substance Use Topics    Alcohol use: Not Currently        All systems reviewed x 12 and were negative with the exception of None      No flowsheet data found. Vitals:  Ht 4' 11\" (1.499 m)   Wt 153 lb (69.4 kg)   BMI 30.90 kg/m²    Body mass index is 30.9 kg/m². Physical Exam    General: NAD    Cardiac: Extremities well perfused. Respiratory: Nonlabored breathing. RLE: Incision clean dry and intact with no erythema. Minimal numbness over the LFCN. Normal gait and station. Negative stinchfield. No pain with gentle internal and external rotation. .  Motor strength is grossly intact. Skin warm well perfused. Capillary refill <2 sec. Marilu oV M.D. was available for immediate consultation as the supervising physician.         An electronic signature was used to authenticate this note.  -- ISRAEL PuenteC

## 2022-10-28 ENCOUNTER — OFFICE VISIT (OUTPATIENT)
Dept: ORTHOPEDIC SURGERY | Age: 87
End: 2022-10-28
Payer: MEDICARE

## 2022-10-28 DIAGNOSIS — Z96.649 PAIN DUE TO HIP JOINT PROSTHESIS, UNSPECIFIED LATERALITY, INITIAL ENCOUNTER (HCC): ICD-10-CM

## 2022-10-28 DIAGNOSIS — T84.84XA PAIN DUE TO HIP JOINT PROSTHESIS, UNSPECIFIED LATERALITY, INITIAL ENCOUNTER (HCC): ICD-10-CM

## 2022-10-28 DIAGNOSIS — Z96.641 STATUS POST RIGHT HIP REPLACEMENT: Primary | ICD-10-CM

## 2022-10-28 PROCEDURE — G8536 NO DOC ELDER MAL SCRN: HCPCS | Performed by: ORTHOPAEDIC SURGERY

## 2022-10-28 PROCEDURE — 99213 OFFICE O/P EST LOW 20 MIN: CPT | Performed by: ORTHOPAEDIC SURGERY

## 2022-10-28 PROCEDURE — G8427 DOCREV CUR MEDS BY ELIG CLIN: HCPCS | Performed by: ORTHOPAEDIC SURGERY

## 2022-10-28 PROCEDURE — 1090F PRES/ABSN URINE INCON ASSESS: CPT | Performed by: ORTHOPAEDIC SURGERY

## 2022-10-28 PROCEDURE — 1101F PT FALLS ASSESS-DOCD LE1/YR: CPT | Performed by: ORTHOPAEDIC SURGERY

## 2022-10-28 PROCEDURE — 1123F ACP DISCUSS/DSCN MKR DOCD: CPT | Performed by: ORTHOPAEDIC SURGERY

## 2022-10-28 PROCEDURE — G8417 CALC BMI ABV UP PARAM F/U: HCPCS | Performed by: ORTHOPAEDIC SURGERY

## 2022-10-28 PROCEDURE — G8432 DEP SCR NOT DOC, RNG: HCPCS | Performed by: ORTHOPAEDIC SURGERY

## 2022-10-28 NOTE — PROGRESS NOTES
Lori Green (: 3/21/1930) is a 80 y.o. female patient, here for evaluation of the following chief complaint(s):  Surgical Follow-up (RTHA in 2022, patient had on 10/26, fell on left hip - no pain reported, patient would like to have xray of right hip to be sure no injury to hip was made during fall )       ASSESSMENT/PLAN:  Below is the assessment and plan developed based on review of pertinent history, physical exam, labs, studies, and medications. 68-year-old female comes in today complaining of right-sided hip pain after a fall. She had a right total hip last summer. She says she has had no pain ever since surgery. She is very happy with it. She fell on Wednesday. She said she has some very mild pain over the fall but was able to walk. She said that today she has no pain at all. Her x-rays are completely benign. Follow-up at her next scheduled visit. 1. Status post right hip replacement  -     XR HIP RT W OR WO PELV 2-3 VWS; Future  2. Pain due to hip joint prosthesis, unspecified laterality, initial encounter [facility])      Encounter Diagnoses   Name Primary? Status post right hip replacement Yes    Pain due to hip joint prosthesis, unspecified laterality, initial encounter ([facility]         No follow-ups on file. SUBJECTIVE/OBJECTIVE:  Lori Green (: 3/21/1930) is a 80 y.o. female who presents today for the following:  Chief Complaint   Patient presents with    Surgical Follow-up     RTHA in 2022, patient had on 10/26, fell on left hip - no pain reported, patient would like to have xray of right hip to be sure no injury to hip was made during fall        68-year-old female comes in today complaining of right-sided hip pain after a fall. She had a right total hip last summer. She says she has had no pain ever since surgery. She is very happy with it. She fell on Wednesday. She said she has some very mild pain over the fall but was able to walk.   She said that today she has no pain at all. IMAGING:  XR Results (most recent):  Results from Appointment encounter on 10/28/22    XR HIP RT W OR WO PELV 2-3 VWS    Narrative  Radiographs reviewed including 2 views of the right hip. Status post hip arthroplasty. No evidence of aseptic loosening. Leg lengths and offset are appropriate. Allergies   Allergen Reactions    Aleve [Naproxen Sodium] Unknown (comments)       Current Outpatient Medications   Medication Sig    acetaminophen (TYLENOL) 325 mg tablet Take 2 Tablets by mouth every six (6) hours. pravastatin (PRAVACHOL) 40 mg tablet     potassium chloride SA (MICRO-K) 10 mEq capsule     losartan (COZAAR) 50 mg tablet     flecainide (TAMBOCOR) 50 mg tablet Take 50 mg by mouth every twelve (12) hours. timolol (TIMOPTIC) 0.5 % ophthalmic solution INSTILL 1 DROP INTO BOTH EYES TWICE A DAY    Xarelto 20 mg tab tablet TAKE 1 TABLET BY MOUTH EVERY DAY WITH EVENING MEAL    furosemide (LASIX) 40 mg tablet Take 20 mg by mouth in the morning. No current facility-administered medications for this visit. Past Medical History:   Diagnosis Date    A-fib ([facility] [address]     Arthritis     Cancer ([facility] [address]     STOMACH    Hypertension     PUD (peptic ulcer disease)         Past Surgical History:   Procedure Laterality Date    HX HEENT Right 2013    RESTORED VISION    HX HYSTERECTOMY  1971    NE ABDOMEN SURGERY PROC UNLISTED  2016    REMOVED CANCER FROM STOMACH       Family History   Problem Relation Age of Onset    Cancer Mother     Diabetes Sister     Cancer Sister     Hypertension Son     Diabetes Son     Cancer Son     Headache Daughter     Anesth Problems Neg Hx         Social History     Tobacco Use    Smoking status: Never    Smokeless tobacco: Never   Substance Use Topics    Alcohol use: Not Currently        All systems reviewed x 12 and were negative with the exception of None      No flowsheet data found. Vitals: There were no vitals taken for this visit.    There is no height or weight on file to calculate BMI. Physical Exam      General: NAD    Cardiac: Extremities well perfused. Respiratory: Nonlabored breathing. RLE: Incision clean dry and intact with no erythema. Minimal numbness over the LFCN. Normal gait and station. Negative stinchfield. No pain with gentle internal and external rotation. .  Motor strength is grossly intact. Skin warm well perfused. Capillary refill <2 sec. An electronic signature was used to authenticate this note.   -- Dk Kennedy MD

## 2023-05-02 NOTE — PROGRESS NOTES
Chronic problem, uncontrolled, we have increased her dose of losartan/HCTZ 2 weeks ago and the numbers still above 130s.   -Patient will bring BP cuff for calibration in 3 weeks    -Continue healthful lifestyle measures   - Continue losartan-HCTZ twice daily   - Consider adding second agent if BP remains uncontrolled   Physical Therapy    Patient was sleeping soundly. Awakened her and she stated that she did not want to get up, that she needed to sleep. Will follow up tomorrow.     Kailee Denney

## 2024-01-01 ENCOUNTER — HOSPITAL ENCOUNTER (OUTPATIENT)
Facility: HOSPITAL | Age: 88
Setting detail: INFUSION SERIES
End: 2024-01-01

## 2024-01-01 DIAGNOSIS — Z72.89 OTHER PROBLEMS RELATED TO LIFESTYLE: ICD-10-CM

## 2024-01-01 DIAGNOSIS — C82.00 FOLLICULAR LYMPHOMA GRADE I, UNSPECIFIED BODY REGION (HCC): ICD-10-CM

## 2024-01-01 DIAGNOSIS — Z11.59 ENCOUNTER FOR SCREENING FOR OTHER VIRAL DISEASES: Primary | ICD-10-CM

## 2024-07-23 ENCOUNTER — HOSPITAL ENCOUNTER (INPATIENT)
Facility: HOSPITAL | Age: 89
LOS: 3 days | Discharge: HOME HEALTH | End: 2024-07-26
Attending: STUDENT IN AN ORGANIZED HEALTH CARE EDUCATION/TRAINING PROGRAM | Admitting: FAMILY MEDICINE
Payer: MEDICARE

## 2024-07-23 ENCOUNTER — APPOINTMENT (OUTPATIENT)
Facility: HOSPITAL | Age: 89
End: 2024-07-23
Payer: MEDICARE

## 2024-07-23 DIAGNOSIS — I50.9 CONGESTIVE HEART FAILURE, UNSPECIFIED HF CHRONICITY, UNSPECIFIED HEART FAILURE TYPE (HCC): ICD-10-CM

## 2024-07-23 DIAGNOSIS — E87.5 HYPERKALEMIA: ICD-10-CM

## 2024-07-23 DIAGNOSIS — E87.1 HYPONATREMIA: ICD-10-CM

## 2024-07-23 DIAGNOSIS — R53.1 GENERALIZED WEAKNESS: Primary | ICD-10-CM

## 2024-07-23 LAB
ALBUMIN SERPL-MCNC: 2.5 G/DL (ref 3.5–5)
ALBUMIN SERPL-MCNC: 2.5 G/DL (ref 3.5–5)
ALBUMIN SERPL-MCNC: 3.3 G/DL (ref 3.5–5)
ALBUMIN SERPL-MCNC: 3.3 G/DL (ref 3.5–5)
ALBUMIN/GLOB SERPL: 0.7 (ref 1.1–2.2)
ALP SERPL-CCNC: 97 U/L (ref 45–117)
ALT SERPL-CCNC: 17 U/L (ref 12–78)
ANION GAP SERPL CALC-SCNC: 10 MMOL/L (ref 5–15)
ANION GAP SERPL CALC-SCNC: 13 MMOL/L (ref 5–15)
ANION GAP SERPL CALC-SCNC: 6 MMOL/L (ref 5–15)
ANION GAP SERPL CALC-SCNC: 8 MMOL/L (ref 5–15)
ANION GAP SERPL CALC-SCNC: 8 MMOL/L (ref 5–15)
ANION GAP SERPL CALC-SCNC: 9 MMOL/L (ref 5–15)
APPEARANCE UR: CLEAR
AST SERPL-CCNC: 15 U/L (ref 15–37)
BACTERIA URNS QL MICRO: NEGATIVE /HPF
BASOPHILS # BLD: 0 K/UL (ref 0–0.1)
BASOPHILS NFR BLD: 0 % (ref 0–1)
BILIRUB SERPL-MCNC: 0.5 MG/DL (ref 0.2–1)
BILIRUB UR QL: NEGATIVE
BUN SERPL-MCNC: 12 MG/DL (ref 6–20)
BUN SERPL-MCNC: 12 MG/DL (ref 6–20)
BUN SERPL-MCNC: 15 MG/DL (ref 6–20)
BUN SERPL-MCNC: 16 MG/DL (ref 6–20)
BUN SERPL-MCNC: 9 MG/DL (ref 6–20)
BUN SERPL-MCNC: 9 MG/DL (ref 6–20)
BUN/CREAT SERPL: 12 (ref 12–20)
BUN/CREAT SERPL: 12 (ref 12–20)
BUN/CREAT SERPL: 14 (ref 12–20)
BUN/CREAT SERPL: 14 (ref 12–20)
BUN/CREAT SERPL: 15 (ref 12–20)
BUN/CREAT SERPL: 17 (ref 12–20)
CALCIUM SERPL-MCNC: 8.2 MG/DL (ref 8.5–10.1)
CALCIUM SERPL-MCNC: 8.3 MG/DL (ref 8.5–10.1)
CALCIUM SERPL-MCNC: 8.5 MG/DL (ref 8.5–10.1)
CALCIUM SERPL-MCNC: 8.5 MG/DL (ref 8.5–10.1)
CALCIUM SERPL-MCNC: 9.5 MG/DL (ref 8.5–10.1)
CALCIUM SERPL-MCNC: 9.6 MG/DL (ref 8.5–10.1)
CHLORIDE SERPL-SCNC: 85 MMOL/L (ref 97–108)
CHLORIDE SERPL-SCNC: 86 MMOL/L (ref 97–108)
CHLORIDE SERPL-SCNC: 92 MMOL/L (ref 97–108)
CHLORIDE SERPL-SCNC: 93 MMOL/L (ref 97–108)
CHLORIDE SERPL-SCNC: 94 MMOL/L (ref 97–108)
CHLORIDE SERPL-SCNC: 94 MMOL/L (ref 97–108)
CO2 SERPL-SCNC: 21 MMOL/L (ref 21–32)
CO2 SERPL-SCNC: 21 MMOL/L (ref 21–32)
CO2 SERPL-SCNC: 22 MMOL/L (ref 21–32)
CO2 SERPL-SCNC: 22 MMOL/L (ref 21–32)
CO2 SERPL-SCNC: 23 MMOL/L (ref 21–32)
CO2 SERPL-SCNC: 25 MMOL/L (ref 21–32)
COLOR UR: NORMAL
COMMENT:: NORMAL
COMMENT:: NORMAL
CORTIS AM PEAK SERPL-MCNC: 17.7 UG/DL (ref 4.3–22.45)
CREAT SERPL-MCNC: 0.74 MG/DL (ref 0.55–1.02)
CREAT SERPL-MCNC: 0.76 MG/DL (ref 0.55–1.02)
CREAT SERPL-MCNC: 0.83 MG/DL (ref 0.55–1.02)
CREAT SERPL-MCNC: 0.84 MG/DL (ref 0.55–1.02)
CREAT SERPL-MCNC: 0.96 MG/DL (ref 0.55–1.02)
CREAT SERPL-MCNC: 0.97 MG/DL (ref 0.55–1.02)
DIFFERENTIAL METHOD BLD: ABNORMAL
EKG ATRIAL RATE: 69 BPM
EKG DIAGNOSIS: NORMAL
EKG P AXIS: 63 DEGREES
EKG P-R INTERVAL: 352 MS
EKG Q-T INTERVAL: 410 MS
EKG QRS DURATION: 96 MS
EKG QTC CALCULATION (BAZETT): 439 MS
EKG R AXIS: 11 DEGREES
EKG T AXIS: 63 DEGREES
EKG VENTRICULAR RATE: 69 BPM
EOSINOPHIL # BLD: 0 K/UL (ref 0–0.4)
EOSINOPHIL NFR BLD: 1 % (ref 0–7)
EPITH CASTS URNS QL MICRO: NORMAL /LPF
ERYTHROCYTE [DISTWIDTH] IN BLOOD BY AUTOMATED COUNT: 13.6 % (ref 11.5–14.5)
FLUAV RNA SPEC QL NAA+PROBE: NOT DETECTED
FLUBV RNA SPEC QL NAA+PROBE: NOT DETECTED
GLOBULIN SER CALC-MCNC: 4.8 G/DL (ref 2–4)
GLUCOSE SERPL-MCNC: 102 MG/DL (ref 65–100)
GLUCOSE SERPL-MCNC: 106 MG/DL (ref 65–100)
GLUCOSE SERPL-MCNC: 110 MG/DL (ref 65–100)
GLUCOSE SERPL-MCNC: 111 MG/DL (ref 65–100)
GLUCOSE SERPL-MCNC: 112 MG/DL (ref 65–100)
GLUCOSE SERPL-MCNC: 115 MG/DL (ref 65–100)
GLUCOSE UR STRIP.AUTO-MCNC: NEGATIVE MG/DL
HCT VFR BLD AUTO: 39.1 % (ref 35–47)
HGB BLD-MCNC: 13.3 G/DL (ref 11.5–16)
HGB UR QL STRIP: NEGATIVE
HYALINE CASTS URNS QL MICRO: NORMAL /LPF (ref 0–5)
IMM GRANULOCYTES # BLD AUTO: 0 K/UL (ref 0–0.04)
IMM GRANULOCYTES NFR BLD AUTO: 1 % (ref 0–0.5)
KETONES UR QL STRIP.AUTO: NEGATIVE MG/DL
LEUKOCYTE ESTERASE UR QL STRIP.AUTO: NEGATIVE
LYMPHOCYTES # BLD: 0.9 K/UL (ref 0.8–3.5)
LYMPHOCYTES NFR BLD: 14 % (ref 12–49)
MAGNESIUM SERPL-MCNC: 1.9 MG/DL (ref 1.6–2.4)
MCH RBC QN AUTO: 27.4 PG (ref 26–34)
MCHC RBC AUTO-ENTMCNC: 34 G/DL (ref 30–36.5)
MCV RBC AUTO: 80.6 FL (ref 80–99)
MONOCYTES # BLD: 0.8 K/UL (ref 0–1)
MONOCYTES NFR BLD: 12 % (ref 5–13)
NEUTS SEG # BLD: 4.6 K/UL (ref 1.8–8)
NEUTS SEG NFR BLD: 72 % (ref 32–75)
NITRITE UR QL STRIP.AUTO: NEGATIVE
NRBC # BLD: 0 K/UL (ref 0–0.01)
NRBC BLD-RTO: 0 PER 100 WBC
OSMOLALITY SERPL: 261 MOSM/KG H2O
OSMOLALITY UR: 335 MOSM/KG H2O
PH UR STRIP: 6.5 (ref 5–8)
PHOSPHATE SERPL-MCNC: 1.9 MG/DL (ref 2.6–4.7)
PHOSPHATE SERPL-MCNC: 2.1 MG/DL (ref 2.6–4.7)
PHOSPHATE SERPL-MCNC: 2.2 MG/DL (ref 2.6–4.7)
PLATELET # BLD AUTO: 348 K/UL (ref 150–400)
PMV BLD AUTO: 9.1 FL (ref 8.9–12.9)
POTASSIUM SERPL-SCNC: 4.3 MMOL/L (ref 3.5–5.1)
POTASSIUM SERPL-SCNC: 4.3 MMOL/L (ref 3.5–5.1)
POTASSIUM SERPL-SCNC: 4.5 MMOL/L (ref 3.5–5.1)
POTASSIUM SERPL-SCNC: 4.5 MMOL/L (ref 3.5–5.1)
POTASSIUM SERPL-SCNC: 5.3 MMOL/L (ref 3.5–5.1)
POTASSIUM SERPL-SCNC: 5.3 MMOL/L (ref 3.5–5.1)
POTASSIUM UR-SCNC: 18 MMOL/L
PROT SERPL-MCNC: 8.1 G/DL (ref 6.4–8.2)
PROT UR STRIP-MCNC: NEGATIVE MG/DL
RBC # BLD AUTO: 4.85 M/UL (ref 3.8–5.2)
RBC #/AREA URNS HPF: NORMAL /HPF (ref 0–5)
SARS-COV-2 RNA RESP QL NAA+PROBE: NOT DETECTED
SODIUM SERPL-SCNC: 119 MMOL/L (ref 136–145)
SODIUM SERPL-SCNC: 120 MMOL/L (ref 136–145)
SODIUM SERPL-SCNC: 123 MMOL/L (ref 136–145)
SODIUM SERPL-SCNC: 124 MMOL/L (ref 136–145)
SODIUM UR-SCNC: 87 MMOL/L
SP GR UR REFRACTOMETRY: 1.01 (ref 1–1.03)
SPECIMEN HOLD: NORMAL
SPECIMEN HOLD: NORMAL
TROPONIN I SERPL HS-MCNC: 11 NG/L (ref 0–51)
TSH SERPL DL<=0.05 MIU/L-ACNC: 2.76 UIU/ML (ref 0.36–3.74)
UROBILINOGEN UR QL STRIP.AUTO: 0.2 EU/DL (ref 0.2–1)
WBC # BLD AUTO: 6.3 K/UL (ref 3.6–11)
WBC URNS QL MICRO: NORMAL /HPF (ref 0–4)

## 2024-07-23 PROCEDURE — 97161 PT EVAL LOW COMPLEX 20 MIN: CPT

## 2024-07-23 PROCEDURE — 2580000003 HC RX 258: Performed by: NURSE PRACTITIONER

## 2024-07-23 PROCEDURE — 36415 COLL VENOUS BLD VENIPUNCTURE: CPT

## 2024-07-23 PROCEDURE — 97530 THERAPEUTIC ACTIVITIES: CPT

## 2024-07-23 PROCEDURE — 80048 BASIC METABOLIC PNL TOTAL CA: CPT

## 2024-07-23 PROCEDURE — 80053 COMPREHEN METABOLIC PANEL: CPT

## 2024-07-23 PROCEDURE — 83735 ASSAY OF MAGNESIUM: CPT

## 2024-07-23 PROCEDURE — 97535 SELF CARE MNGMENT TRAINING: CPT

## 2024-07-23 PROCEDURE — 85025 COMPLETE CBC W/AUTO DIFF WBC: CPT

## 2024-07-23 PROCEDURE — 84300 ASSAY OF URINE SODIUM: CPT

## 2024-07-23 PROCEDURE — 6370000000 HC RX 637 (ALT 250 FOR IP): Performed by: NURSE PRACTITIONER

## 2024-07-23 PROCEDURE — 82533 TOTAL CORTISOL: CPT

## 2024-07-23 PROCEDURE — 83935 ASSAY OF URINE OSMOLALITY: CPT

## 2024-07-23 PROCEDURE — 80069 RENAL FUNCTION PANEL: CPT

## 2024-07-23 PROCEDURE — 87636 SARSCOV2 & INF A&B AMP PRB: CPT

## 2024-07-23 PROCEDURE — 83930 ASSAY OF BLOOD OSMOLALITY: CPT

## 2024-07-23 PROCEDURE — 84133 ASSAY OF URINE POTASSIUM: CPT

## 2024-07-23 PROCEDURE — 93005 ELECTROCARDIOGRAM TRACING: CPT | Performed by: STUDENT IN AN ORGANIZED HEALTH CARE EDUCATION/TRAINING PROGRAM

## 2024-07-23 PROCEDURE — 71046 X-RAY EXAM CHEST 2 VIEWS: CPT

## 2024-07-23 PROCEDURE — 97165 OT EVAL LOW COMPLEX 30 MIN: CPT

## 2024-07-23 PROCEDURE — 93010 ELECTROCARDIOGRAM REPORT: CPT | Performed by: SPECIALIST

## 2024-07-23 PROCEDURE — 2060000000 HC ICU INTERMEDIATE R&B

## 2024-07-23 PROCEDURE — 84484 ASSAY OF TROPONIN QUANT: CPT

## 2024-07-23 PROCEDURE — 84443 ASSAY THYROID STIM HORMONE: CPT

## 2024-07-23 PROCEDURE — 81001 URINALYSIS AUTO W/SCOPE: CPT

## 2024-07-23 PROCEDURE — 99285 EMERGENCY DEPT VISIT HI MDM: CPT

## 2024-07-23 RX ORDER — SODIUM CHLORIDE 9 MG/ML
INJECTION, SOLUTION INTRAVENOUS CONTINUOUS
Status: DISCONTINUED | OUTPATIENT
Start: 2024-07-23 | End: 2024-07-24

## 2024-07-23 RX ORDER — ACETAMINOPHEN 325 MG/1
650 TABLET ORAL EVERY 6 HOURS PRN
Status: DISCONTINUED | OUTPATIENT
Start: 2024-07-23 | End: 2024-07-26 | Stop reason: HOSPADM

## 2024-07-23 RX ORDER — ACETAMINOPHEN 650 MG/1
650 SUPPOSITORY RECTAL EVERY 6 HOURS PRN
Status: DISCONTINUED | OUTPATIENT
Start: 2024-07-23 | End: 2024-07-26 | Stop reason: HOSPADM

## 2024-07-23 RX ORDER — POTASSIUM CHLORIDE 7.45 MG/ML
10 INJECTION INTRAVENOUS PRN
Status: DISCONTINUED | OUTPATIENT
Start: 2024-07-23 | End: 2024-07-26 | Stop reason: HOSPADM

## 2024-07-23 RX ORDER — PRAVASTATIN SODIUM 20 MG
20 TABLET ORAL NIGHTLY
Status: DISCONTINUED | OUTPATIENT
Start: 2024-07-23 | End: 2024-07-26 | Stop reason: HOSPADM

## 2024-07-23 RX ORDER — POLYETHYLENE GLYCOL 3350 17 G/17G
17 POWDER, FOR SOLUTION ORAL DAILY PRN
Status: DISCONTINUED | OUTPATIENT
Start: 2024-07-23 | End: 2024-07-26 | Stop reason: HOSPADM

## 2024-07-23 RX ORDER — MAGNESIUM SULFATE IN WATER 40 MG/ML
2000 INJECTION, SOLUTION INTRAVENOUS PRN
Status: DISCONTINUED | OUTPATIENT
Start: 2024-07-23 | End: 2024-07-26 | Stop reason: HOSPADM

## 2024-07-23 RX ORDER — SODIUM CHLORIDE 0.9 % (FLUSH) 0.9 %
5-40 SYRINGE (ML) INJECTION EVERY 12 HOURS SCHEDULED
Status: DISCONTINUED | OUTPATIENT
Start: 2024-07-23 | End: 2024-07-26 | Stop reason: HOSPADM

## 2024-07-23 RX ORDER — TIMOLOL MALEATE 5 MG/ML
1 SOLUTION/ DROPS OPHTHALMIC 2 TIMES DAILY
Status: DISCONTINUED | OUTPATIENT
Start: 2024-07-23 | End: 2024-07-26 | Stop reason: HOSPADM

## 2024-07-23 RX ORDER — SULFAMETHOXAZOLE AND TRIMETHOPRIM 800; 160 MG/1; MG/1
1 TABLET ORAL EVERY 12 HOURS SCHEDULED
Status: DISCONTINUED | OUTPATIENT
Start: 2024-07-23 | End: 2024-07-25

## 2024-07-23 RX ORDER — ONDANSETRON 2 MG/ML
4 INJECTION INTRAMUSCULAR; INTRAVENOUS EVERY 6 HOURS PRN
Status: DISCONTINUED | OUTPATIENT
Start: 2024-07-23 | End: 2024-07-26 | Stop reason: HOSPADM

## 2024-07-23 RX ORDER — POTASSIUM CHLORIDE 750 MG/1
40 TABLET, FILM COATED, EXTENDED RELEASE ORAL PRN
Status: DISCONTINUED | OUTPATIENT
Start: 2024-07-23 | End: 2024-07-26 | Stop reason: HOSPADM

## 2024-07-23 RX ORDER — SODIUM CHLORIDE 9 MG/ML
INJECTION, SOLUTION INTRAVENOUS PRN
Status: DISCONTINUED | OUTPATIENT
Start: 2024-07-23 | End: 2024-07-26 | Stop reason: HOSPADM

## 2024-07-23 RX ORDER — LOSARTAN POTASSIUM 50 MG/1
50 TABLET ORAL DAILY
Status: DISCONTINUED | OUTPATIENT
Start: 2024-07-23 | End: 2024-07-26 | Stop reason: HOSPADM

## 2024-07-23 RX ORDER — SULFAMETHOXAZOLE AND TRIMETHOPRIM 800; 160 MG/1; MG/1
1 TABLET ORAL EVERY 12 HOURS SCHEDULED
Status: DISCONTINUED | OUTPATIENT
Start: 2024-07-23 | End: 2024-07-23

## 2024-07-23 RX ORDER — ONDANSETRON 4 MG/1
4 TABLET, ORALLY DISINTEGRATING ORAL EVERY 8 HOURS PRN
Status: DISCONTINUED | OUTPATIENT
Start: 2024-07-23 | End: 2024-07-26 | Stop reason: HOSPADM

## 2024-07-23 RX ORDER — ACETAMINOPHEN 325 MG/1
650 TABLET ORAL EVERY 6 HOURS
Status: DISCONTINUED | OUTPATIENT
Start: 2024-07-23 | End: 2024-07-26 | Stop reason: HOSPADM

## 2024-07-23 RX ORDER — FLECAINIDE ACETATE 50 MG/1
50 TABLET ORAL EVERY 12 HOURS
Status: DISCONTINUED | OUTPATIENT
Start: 2024-07-23 | End: 2024-07-26 | Stop reason: HOSPADM

## 2024-07-23 RX ORDER — SODIUM CHLORIDE 0.9 % (FLUSH) 0.9 %
5-40 SYRINGE (ML) INJECTION PRN
Status: DISCONTINUED | OUTPATIENT
Start: 2024-07-23 | End: 2024-07-26 | Stop reason: HOSPADM

## 2024-07-23 RX ADMIN — FLECAINIDE ACETATE 50 MG: 50 TABLET ORAL at 09:52

## 2024-07-23 RX ADMIN — FLECAINIDE ACETATE 50 MG: 50 TABLET ORAL at 20:41

## 2024-07-23 RX ADMIN — TIMOLOL MALEATE 1 DROP: 5 SOLUTION/ DROPS OPHTHALMIC at 21:12

## 2024-07-23 RX ADMIN — RIVAROXABAN 20 MG: 20 TABLET, FILM COATED ORAL at 17:13

## 2024-07-23 RX ADMIN — ACETAMINOPHEN 650 MG: 325 TABLET ORAL at 23:19

## 2024-07-23 RX ADMIN — ACETAMINOPHEN 650 MG: 325 TABLET ORAL at 09:51

## 2024-07-23 RX ADMIN — LOSARTAN POTASSIUM 50 MG: 50 TABLET, FILM COATED ORAL at 09:51

## 2024-07-23 RX ADMIN — ACETAMINOPHEN 650 MG: 325 TABLET ORAL at 17:12

## 2024-07-23 RX ADMIN — SODIUM CHLORIDE: 9 INJECTION, SOLUTION INTRAVENOUS at 03:49

## 2024-07-23 RX ADMIN — SODIUM CHLORIDE: 9 INJECTION, SOLUTION INTRAVENOUS at 18:32

## 2024-07-23 RX ADMIN — PRAVASTATIN SODIUM 20 MG: 20 TABLET ORAL at 20:41

## 2024-07-23 ASSESSMENT — PAIN - FUNCTIONAL ASSESSMENT
PAIN_FUNCTIONAL_ASSESSMENT: ACTIVITIES ARE NOT PREVENTED
PAIN_FUNCTIONAL_ASSESSMENT: 0-10

## 2024-07-23 ASSESSMENT — ENCOUNTER SYMPTOMS
NAUSEA: 0
VOMITING: 0
CHEST TIGHTNESS: 0
SHORTNESS OF BREATH: 0
ABDOMINAL PAIN: 0

## 2024-07-23 ASSESSMENT — PAIN SCALES - GENERAL
PAINLEVEL_OUTOF10: 0
PAINLEVEL_OUTOF10: 2
PAINLEVEL_OUTOF10: 0
PAINLEVEL_OUTOF10: 0

## 2024-07-23 ASSESSMENT — PAIN DESCRIPTION - ORIENTATION: ORIENTATION: RIGHT

## 2024-07-23 ASSESSMENT — PAIN DESCRIPTION - DESCRIPTORS: DESCRIPTORS: ACHING

## 2024-07-23 ASSESSMENT — PAIN DESCRIPTION - LOCATION: LOCATION: HIP

## 2024-07-23 NOTE — PROGRESS NOTES
Patient admitted to floor from ED approx 1610 hrs.  Two daughters with patient.  Patient alert and oriented x4.  Trace edema to BLEs.  Pure wick draining moderate amounts of straw-colored urine.  Admission done.  RN advised patient and children on fluid restrictions.  All verbalize understanding.  RN will continue to monitor

## 2024-07-23 NOTE — H&P
Son     Cancer Son     Hypertension Son     Anesth Problems Neg Hx     Headache Daughter     Cancer Sister     Diabetes Sister     Cancer Mother       Social History:  reports that she has never smoked. She has never used smokeless tobacco. She reports that she does not currently use alcohol. She reports that she does not currently use drugs.   Social Determinants of Health     Tobacco Use: Low Risk  (10/28/2022)    Patient History     Smoking Tobacco Use: Never     Smokeless Tobacco Use: Never     Passive Exposure: Not on file   Alcohol Use: Not on file   Financial Resource Strain: Not on file   Food Insecurity: Not on file   Transportation Needs: Not on file   Physical Activity: Not on file   Stress: Not on file   Social Connections: Not on file   Intimate Partner Violence: Not on file   Depression: Not on file   Housing Stability: Not on file   Interpersonal Safety: Not on file   Utilities: Not on file        Medications were reconciled to the best of my ability given all available resources at the time of admission. Route is PO if not otherwise noted.     Family and social history were personally reviewed, all pertinent and relevant details are outlined as above.    Objective:   /70   Pulse 66   Temp 97.6 °F (36.4 °C) (Oral)   Resp 14   Ht 1.499 m (4' 11\")   Wt 79.6 kg (175 lb 7.8 oz)   SpO2 99%   BMI 35.44 kg/m²         PHYSICAL EXAM:   General : alert x 3, awake, conversational  HEENT: EOMI, normocephalic, atraumatic, moist mucous membranes   Neck: supple, nontender, no JVD, no masses or swelling   Respiratory: clear to auscultation, no distress, normal resp rate  Cardiovascular: regular rate and rhythm, no murmur appreciated, normal heart sounds   Abd: non-tender, soft, no distention, bowel sounds active x 4 quadrants  Genitourinary: no reyes  Extremities: moves all, normal capillary refill, trace bilateral lower extremity edema  Neuro: alert, oriented x 3, normal speech, no obvious motor  deficits   Skin: warm, dry, normal color, no rash  Psych: normal affect, normal judgment/insight, normal mood    Data Review:   I have independently reviewed and interpreted patient's lab and all other diagnostic data    Abnormal Labs Reviewed   CBC WITH AUTO DIFFERENTIAL - Abnormal; Notable for the following components:       Result Value    Immature Granulocytes % 1 (*)     All other components within normal limits   COMPREHENSIVE METABOLIC PANEL - Abnormal; Notable for the following components:    Sodium 119 (*)     Potassium 5.3 (*)     Chloride 85 (*)     Glucose 106 (*)     Est, Glom Filt Rate 55 (*)     Albumin 3.3 (*)     Globulin 4.8 (*)     Albumin/Globulin Ratio 0.7 (*)     All other components within normal limits   RENAL FUNCTION PANEL - Abnormal; Notable for the following components:    Sodium 120 (*)     Potassium 5.3 (*)     Chloride 86 (*)     Glucose 102 (*)     Est, Glom Filt Rate 54 (*)     Phosphorus 1.9 (*)     Albumin 3.3 (*)     All other components within normal limits       [unfilled]    IMAGING:   XR CHEST (2 VW)   Final Result      Small left pleural effusion with left lower lobe atelectasis.         Electronically signed by JOHN PAUL GAY           ECG/ECHO:  [unfilled]       Notes reviewed from all clinical/nonclinical/nursing services involved in patient's clinical care. Care coordination discussions were held with appropriate clinical/nonclinical/ nursing providers based on care coordination needs.     Assessment:   Given the patient's current clinical presentation, there is a high level of concern for decompensation if discharged from the emergency department. Complex decision making was performed, which includes reviewing the patient's available past medical records, laboratory results, and imaging studies.    Principal Problem:    Acute hyponatremia  Active Problems:    Hyponatremia  Resolved Problems:    * No resolved hospital problems. *      Plan:     Severe

## 2024-07-23 NOTE — CONSULTS
NEPHROLOGY CONSULT NOTE     Patient: Elizabeth Kay MRN: [id number]  PCP: Valentina Hills MD   :     3/21/1930  Age:   89 y.o.  Sex:  female      Referring physician: Jeffy Aguilar DO  Reason for consultation: 89 y.o. female with hyponatremia  Chief Complaint: Weakness  Admission Date: 2024 12:52 AM  LOS: 0 days      ASSESSMENT and PLAN :   Hyponatremia  -Sodium 119, down from 133 on 7/3/24. Denies prior history of hyponatremia or nephrology evaluation.  -Etiology unclear, suspect hypovolemic hyponatremia in the setting of poor intake, diuretic use  -No immediate indication for 3% saline, will trial NS and repeat labs in 4 hours  -Send urine chemistries: Osmolality, sodium, potassium, chloride  -Check serum osmolality, TSH, a.m. cortisol  -Fluid restrict 1500 mL/day  -Bladder scanned in the ER, not retaining  -Start NS 75 MLS per hour  -Hold home Lasix  -Repeat labs in 4 hours and every 6 hours thereafter  -Goal correction 6 mmol/L over 24 hours, 125-126 by midnight    Hyperkalemia  -Potassium 5.3  -Hold home potassium supplements  -Volume expansion with NS  -Monitor with serial labs    HTN  -Resume home anithypertensive regimen    pAfib  -On Eliquis    CHF, hx of  -Hold Lasix  -Monitor for signs of overload with IVF    Care Plan discussed with:  Patient, family, nurse, Dr Nguyen (nephrology supervising physician)        Thank you for consulting [facility] in the care of your patient.      Subjective:   HPI: Elizabeth Kay is a 89 y.o.  female with past medical history of HTN, pAfib, CHF, recent pneumonia who has been admitted to the hospital for hyponatremia.  Patient brought to the emergency department with family due to generalized weakness, inability to get out of bed.  Family reports that symptoms have been progressing for the past 2 to 3 weeks following discharge from [facility] for pneumonia.  Discharged from  on 7/3/2024 on  azithromycin for treatment of pneumonia.  Since then, she was seen by her PCP and started on Bactrim for skin infection.  Throughout this time, family reports worsening weakness, poor appetite.  This morning, patient was unable to get out of bed, prompting visit to the emergency department.  Evaluation in the emergency department revealed hyponatremia, hyperkalemia, CXR with small left pleural effusion with left lower lobe atelectasis.  Plan admission to the hospitalist service for management of hyponatremia.  Nephrology is consulted for management of hyponatremia  -Sodium 119 down from 133 on recent hospital discharge.  Denies prior history of hyponatremia, has not seen nephrologist in the past  -Home medication list reviewed, she is on Lasix, no thiazide diuretics, SSRIs, AEDs, antipsychotics, NSAIDs  -Endorses poor appetite, decreased intake of both fluids and solutes  -Denies persistent nausea, vomiting, diarrhea  -Denies alcohol use  -Potassium 5.3, she is currently Kcl 10meq daily    Past Medical History:   Diagnosis Date    A-fib (HCC)     Arthritis     Cancer (HCC)     STOMACH    Hypertension     PUD (peptic ulcer disease)         Past Surgical History:   Procedure Laterality Date    HEENT Right 2013    RESTORED VISION    HYSTERECTOMY (CERVIX STATUS UNKNOWN)  1971    AR ABDOMEN SURGERY PROC UNLISTED  2016    REMOVED CANCER FROM STOMACH       Allergies   Allergen Reactions    Naproxen Sodium      Other reaction(s): Unknown (comments)       Social Hx:    reports that she has never smoked. She has never used smokeless tobacco. She reports that she does not currently use alcohol. She reports that she does not currently use drugs.     Family History   Problem Relation Age of Onset    Diabetes Son     Cancer Son     Hypertension Son     Anesth Problems Neg Hx     Headache Daughter     Cancer Sister     Diabetes Sister     Cancer Mother        Review of Systems   Constitutional:  Positive for activity change,

## 2024-07-23 NOTE — ED TRIAGE NOTES
Pt arrives via EMS from home with c/o generalized weakness. Pt felt this way 2 weeks ago and was at [facility] where she was diagnosed with pneumonia. Pt started having the same generalized weakness this morning around 8 am. Denies CP and denies SOB. Pt is on antibiotics still but has not finished them (sulfamethoxazole). Pt A&O x4    Pmhx: HTN

## 2024-07-23 NOTE — ED NOTES
Patients shirt, pants and underwear removed and change to hospital gown. Patient belongings placed on plastic bag and given to relatives for safe keeping.

## 2024-07-23 NOTE — PLAN OF CARE
Problem: Occupational Therapy - Adult  Goal: By Discharge: Performs self-care activities at highest level of function for planned discharge setting.  See evaluation for individualized goals.  Description: FUNCTIONAL STATUS PRIOR TO ADMISSION:  Patient was ambulatory using a cane typically but more recently has been using a RW due to weakness and was independent for ADLs and IADLs PTA.    ADL Assistance: Independent, Homemaking Assistance: Independent, Ambulation Assistance: Independent, Transfer Assistance: Independent,       HOME SUPPORT: Patient lived with her sister but didn't require assistance.    Occupational Therapy Goals:  Initiated 7/23/2024  1.  Patient will perform grooming standing at sink with Modified [place] within 7 day(s).  2.  Patient will perform lower body dressing using AD PRN with Minimal Assist within 7 day(s).  3.  Patient will perform bathing with Supervision within 7 day(s).  4.  Patient will perform toilet transfers with Modified [place]  within 7 day(s).  5.  Patient will perform all aspects of toileting with Modified [place] within 7 day(s).  6.  Patient will participate in upper extremity therapeutic exercise/activities with [place] for 10 minutes within 7 day(s).    7.  Patient will utilize energy conservation techniques during functional activities with verbal cues within 7 day(s).  Outcome: Progressing   OCCUPATIONAL THERAPY EVALUATION    Patient: Elizabeth Kay (89 y.o. female)  Date: 7/23/2024  Primary Diagnosis: Acute hyponatremia [E87.1]  Hyperkalemia [E87.5]  Hyponatremia [E87.1]  Generalized weakness [R53.1]  Congestive heart failure, unspecified HF chronicity, unspecified heart failure type (HCC) [I50.9]         Precautions: Fall Risk     ASSESSMENT :  The patient is limited by decreased functional mobility, independence in ADLs, high-level IADLs, strength, activity tolerance, endurance, balance. Patient presented to ED for generalized weakness and

## 2024-07-23 NOTE — PLAN OF CARE
Problem: Physical Therapy - Adult  Goal: By Discharge: Performs mobility at highest level of function for planned discharge setting.  See evaluation for individualized goals.  Description: FUNCTIONAL STATUS PRIOR TO ADMISSION: Patient was modified independent using a rolling walker and single point cane for functional mobility.    HOME SUPPORT PRIOR TO ADMISSION: The patient lived with sister but did not require assistance.    Physical Therapy Goals  Initiated 7/23/2024  1.  Patient will move from supine to sit and sit to supine, scoot up and down, and roll side to side in bed with independence within 7 day(s).    2.  Patient will perform sit to stand with modified independence within 7 day(s).  3.  Patient will transfer from bed to chair and chair to bed with modified independence using the least restrictive device within 7 day(s).  4.  Patient will ambulate with modified independence for 150 feet with the least restrictive device within 7 day(s).     Outcome: Progressing   PHYSICAL THERAPY EVALUATION    Patient: Elizabeth Kay (89 y.o. female)  Date: 7/23/2024  Primary Diagnosis: Acute hyponatremia [E87.1]  Hyponatremia [E87.1]       Precautions:                        ASSESSMENT :   DEFICITS/IMPAIRMENTS:   The patient is limited by decreased functional mobility, independence in ADLs, high-level IADLs, ROM, strength, activity tolerance, endurance, coordination, balance, posture s/p admission for acute hyponatremia and generalized weakness/fatigue.    Based on the impairments listed above Elizabeth appears to be below her functional baseline. She was received in bed and cleared for mobility. She reports to have no pain or numbness/tingling and feels weak. She reports she was Mod I (uses cane/RW) mobility and I with all ADLs prior to admission. Today, she required up to Min A with bed mobility & CGA with sit <> stand and ambulation of 40 feet with RW. She demonstrated a slow gait pattern with B decreased step  J. Association of AM-PAC \"6-Clicks\" Basic Mobility and Daily Activity Scores With Discharge Destination. Phys Ther. 2021 4;101(4):qwfk503. doi: 10.1093/ptj/oarj519. PMID: 83762098.  3. Sid OROURKE, Taj D, Deysi S, Abdiel K, Mariah S. Activity Measure for Post-Acute Care \"6-Clicks\" Basic Mobility Scores Predict Discharge Destination After Acute Care Hospitalization in Select Patient Groups: A Retrospective, Observational Study. Arch Rehabil Res Clin Transl. 2022;4(3):273895. doi: 10.1016/j.arrct..996340. PMID: 35571312; PMCID: [id number].  4. Vipul JIMENEZ, Concepción S, Elpidio W, Ligia DELANEY. AM-PAC Short Forms Manual 4.0. Revised 2020.                                                                                                                                                                                                                               Pain Ratin/10     Activity Tolerance:   Fair  and vitals maintained stable throughout session    After treatment:   Patient left in no apparent distress in bed, Call bell within reach, Caregiver / family present, and RN aware    COMMUNICATION/EDUCATION:   The patient's plan of care was discussed with: occupational therapist and registered nurse    Patient Education  Education Given To: Patient;Family  Education Provided: Role of Therapy;Plan of Care;Equipment  Education Provided Comments: management of RW  Education Method: Verbal  Barriers to Learning: None  Education Outcome: Verbalized understanding    Thank you for this referral.  JAMAR Mccain  Minutes: 34      Physical Therapy Evaluation Charge Determination   History Examination Presentation Decision-Making   MEDIUM  Complexity : 1-2 comorbidities / personal factors will impact the outcome/ POC  LOW Complexity : 1-2 Standardized tests and measures addressing body structure, function, activity limitation and / or participation in recreation  LOW Complexity : Stable, uncomplicated  AM-PAC  LOW   breast and formula feeding

## 2024-07-23 NOTE — CONSULTS
Urine chemistries suggest SIADH picture  Continue NS at 75 cc/h  Held Bactrim given hyponatremia and hyperkalemia  Okay to continue losartan  Recheck labs at noon

## 2024-07-23 NOTE — ACP (ADVANCE CARE PLANNING)
Advance Care Planning     Advance Care Planning (ACP) Physician/NP/PA Conversation    Date of Conversation: 7/23/2024  Conducted with: Patient with Decision Making Capacity  Other persons present: Daughter Maricruz Leavitt, daughter Ximena     Healthcare Decision Maker: No healthcare decision makers have been documented.  Click here to complete HealthCare Decision Makers including selection of the Healthcare Decision Maker Relationship (ie \"Primary\")       Care Preferences:    Hospitalization:  \"If your health worsens and it becomes clear that your chance of recovery is unlikely, what would be your preference regarding hospitalization?\"  The patient would prefer hospitalization.    Ventilation:  \"If you were unable to breath on your own and your chance of recovery was unlikely, what would be your preference about the use of a ventilator (breathing machine) if it was available to you?\"  The patient would NOT desire the use of a ventilator.    Resuscitation:  \"In the event your heart stopped as a result of an underlying serious health condition, would you want attempts made to restart your heart, or would you prefer a natural death?\"  No, do NOT attempt to resuscitate.    ventilation preferences, hospitalization preferences, and resuscitation preferences    Conversation Outcomes / Follow-Up Plan:  ACP complete - no further action today  Reviewed DNR/DNI and patient elects DNR order - referred to ACP Clinical Specialist & placed order    Length of Voluntary ACP Conversation in minutes:  16 minutes    AL Keith - CNP

## 2024-07-23 NOTE — ED NOTES
ED TO INPATIENT SBAR HANDOFF    Patient Name: Elizabeth Kay   :  3/21/1930  89 y.o.   MRN:  [id number]  ED Room #:  ER06/06  Family/Caregiver Present yes       Situation  Code Status: DNR     Allergies: Naproxen sodium  Weight: Patient Vitals for the past 96 hrs (Last 3 readings):   Weight   24 0100 79.6 kg (175 lb 7.8 oz)     Arrived from: home  Chief Complaint:   Chief Complaint   Patient presents with    Generalized Weakness     Hospital Problem/Diagnosis:  Principal Problem:    Acute hyponatremia  Active Problems:    Hyponatremia  Resolved Problems:    * No resolved hospital problems. *    Imaging:   XR CHEST (2 VW)   Final Result      Small left pleural effusion with left lower lobe atelectasis.         Electronically signed by JOHN PAUL GAY        Abnormal labs:   Abnormal Labs Reviewed   CBC WITH AUTO DIFFERENTIAL - Abnormal; Notable for the following components:       Result Value    Immature Granulocytes % 1 (*)     All other components within normal limits   COMPREHENSIVE METABOLIC PANEL - Abnormal; Notable for the following components:    Sodium 119 (*)     Potassium 5.3 (*)     Chloride 85 (*)     Glucose 106 (*)     Est, Glom Filt Rate 55 (*)     Albumin 3.3 (*)     Globulin 4.8 (*)     Albumin/Globulin Ratio 0.7 (*)     All other components within normal limits   RENAL FUNCTION PANEL - Abnormal; Notable for the following components:    Sodium 120 (*)     Potassium 5.3 (*)     Chloride 86 (*)     Glucose 102 (*)     Est, Glom Filt Rate 54 (*)     Phosphorus 1.9 (*)     Albumin 3.3 (*)     All other components within normal limits   BASIC METABOLIC PANEL - Abnormal; Notable for the following components:    Sodium 123 (*)     Chloride 92 (*)     Glucose 110 (*)     Calcium 8.2 (*)     All other components within normal limits   RENAL FUNCTION PANEL - Abnormal; Notable for the following components:    Sodium 123 (*)     Chloride 93 (*)     Glucose 111 (*)     Calcium 8.3 (*)     Phosphorus 2.2  alert  Orientation Level: Orientation Level: Oriented X4  NIH Score:    C-SSRS: Risk of Suicide: No Risk  Bedside swallow:    [place] Coma Scale (GCS): [place] Coma Scale  Eye Opening: Spontaneous  Best Verbal Response: Oriented  Best Motor Response: Obeys commands  [place] Coma Scale Score: 15  Active LDA's:   Peripheral IV 07/23/24 Right Forearm (Active)   Site Assessment Clean, dry & intact;Clean;Dry;Intact 07/23/24 0245   Phlebitis Assessment No symptoms 07/23/24 0245   Infiltration Assessment 0 07/23/24 0245   Dressing Status Clean, dry & intact;Clean;Dry;New dressing applied 07/23/24 0245   Dressing Type Transparent 07/23/24 0245     PO Status: Regular  Pertinent or High Risk Medications/Drips: no   If Yes, please provide details: no  Titratable drips: no   Pending Blood Product Administration: no     Sepsis    Sepsis Risk Score   Predictive Model Details          10 (Low)  Factor Value    Calculated 7/23/2024 14:49 10% Albumin 2.5 g/dL    [facility] EARLY DETECTION OF SEPSIS VERSION 2 Model 10% Total Active Inpatient Meds 20     8% O2 Delivery Method ROOM AIR     7% Age 89 years old     -6% Duration of Encounter .6 days     5% Chloride 93 mmol/L     3% Sodium 123 mmol/L     -3% AST 15 U/L     3% Has Imaging Procedure 1     -2% Change in Creatinine 0.97 MG/DL -> 0.84 MG/DL      Recent Labs     07/23/24  0115   WBC 6.3     Blood Cultures Drawn: No   Repeat LA: Time Due   Antibiotic Given: No  Fluid Resuscitation: Total needed , Status other n/a    VS x 2 post-fluid resuscitation: N/A    Recommendation    Pending orders Nephro consult  Consults ordered: IP CONSULT TO NEPHROLOGY    Consulted provider:      Plan for next 24 hours: Serial labs, Echo, PT/OT eval  Additional Comments: none     If any further questions, please call Sending RN at [phone]  Electronically signed by: Electronically signed by Radha Mc RN on 7/23/2024 at 2:50 PM

## 2024-07-23 NOTE — ED PROVIDER NOTES
[facility] EMERGENCY DEP  EMERGENCY DEPARTMENT ENCOUNTER      Pt Name: Elizabeth Kay  MRN: [id number]  Birthdate 3/21/1930  Date of evaluation: 7/23/2024  Provider: Jeffy Aguilar DO    CHIEF COMPLAINT       Chief Complaint   Patient presents with    Generalized Weakness         HISTORY OF PRESENT ILLNESS   (Location/Symptom, Timing/Onset, Context/Setting, Quality, Duration, Modifying Factors, Severity)  Note limiting factors.   89-year-old female history of atrial fibrillation, gastric cancer, hypertension and arthritis presenting today with generalized weakness.  She tells me that she has been feeling weak for the duration of this evening however her daughter is at bedside, and this has been going on for days.  Had recent admission for pneumonia but getting better on antibiotics.  Patient complains of urinary frequency and states that she was so weak that she could not get out of bed to urinate.  She denies any pain such as chest pain, headache, neck pain, abdominal pain.  No shortness of breath.  No fevers or chills.  No vomiting or diarrhea.  No changes in medications other than starting antibiotics recently.  No other complaints at this time.            Review of External Medical Records:     Nursing Notes were reviewed.    REVIEW OF SYSTEMS    (2-9 systems for level 4, 10 or more for level 5)     Review of Systems   Constitutional:  Positive for fatigue.   Genitourinary:  Positive for frequency.       Except as noted above the remainder of the review of systems was reviewed and negative.       PAST MEDICAL HISTORY     Past Medical History:   Diagnosis Date    A-fib (HCC)     Arthritis     Cancer (HCC)     STOMACH    Hypertension     PUD (peptic ulcer disease)          SURGICAL HISTORY       Past Surgical History:   Procedure Laterality Date    HEENT Right 2013    RESTORED VISION    HYSTERECTOMY (CERVIX STATUS UNKNOWN)  1971    MD ABDOMEN SURGERY PROC UNLISTED  2016    REMOVED CANCER FROM STOMACH  hospitalization.          FINAL IMPRESSION      1. Generalized weakness    2. Hyponatremia          DISPOSITION/PLAN   DISPOSITION Decision To Admit 07/23/2024 03:07:17 AM    Perfect Serve Consult for Admission  3:09 AM    ED Room Number: ER06/06  Patient Name and age:  Elizabeth Kay 89 y.o.  female  Working Diagnosis:   1. Generalized weakness    2. Hyponatremia        COVID-19 Suspicion: No  Sepsis present:  No  Reassessment needed: No  Code Status:  Full Code  Readmission: No  Isolation Requirements: no  Recommended Level of Care: telemetry  Department: [facility] Adult ED - [phone]  89 y.o. female here with generalized weakness, marked hyponatremia, mild hyperkalemia. Nephrology consulted    Total critical care time spent exclusive of procedures:  38 minutes.   Due to a high probability of clinically significant, life threatening deterioration, the patient required my highest level of preparedness to intervene emergently and I personally spent this critical care time directly and personally managing the patient. This critical care time included obtaining a history; examining the patient; pulse oximetry; ordering and review of studies; arranging urgent treatment with development of a management plan; evaluation of patient's response to treatment; frequent reassessment; and, discussions with other providers.  This critical care time was performed to assess and manage the high probability of imminent, life-threatening deterioration that could result in multi-organ failure. It was exclusive of separately billable procedures and treating other patients and teaching time.          (Please note that portions of this note were completed with a voice recognition program.  Efforts were made to edit the dictations but occasionally words are mis-transcribed.)    Jeffy Aguilar DO (electronically signed)  Emergency Attending Physician               Jeffy Aguilar DO  07/23/24 0329

## 2024-07-24 ENCOUNTER — APPOINTMENT (OUTPATIENT)
Facility: HOSPITAL | Age: 89
End: 2024-07-24
Payer: MEDICARE

## 2024-07-24 LAB
ALBUMIN SERPL-MCNC: 2.6 G/DL (ref 3.5–5)
ALBUMIN SERPL-MCNC: 2.6 G/DL (ref 3.5–5)
ALBUMIN SERPL-MCNC: 2.7 G/DL (ref 3.5–5)
ANION GAP SERPL CALC-SCNC: 5 MMOL/L (ref 5–15)
ANION GAP SERPL CALC-SCNC: 6 MMOL/L (ref 5–15)
ANION GAP SERPL CALC-SCNC: 6 MMOL/L (ref 5–15)
ANION GAP SERPL CALC-SCNC: 7 MMOL/L (ref 5–15)
ANION GAP SERPL CALC-SCNC: 8 MMOL/L (ref 5–15)
BASOPHILS # BLD: 0 K/UL (ref 0–0.1)
BASOPHILS NFR BLD: 1 % (ref 0–1)
BUN SERPL-MCNC: 22 MG/DL (ref 6–20)
BUN SERPL-MCNC: 22 MG/DL (ref 6–20)
BUN SERPL-MCNC: 27 MG/DL (ref 6–20)
BUN SERPL-MCNC: 8 MG/DL (ref 6–20)
BUN SERPL-MCNC: 8 MG/DL (ref 6–20)
BUN/CREAT SERPL: 12 (ref 12–20)
BUN/CREAT SERPL: 12 (ref 12–20)
BUN/CREAT SERPL: 25 (ref 12–20)
BUN/CREAT SERPL: 26 (ref 12–20)
BUN/CREAT SERPL: 38 (ref 12–20)
CALCIUM SERPL-MCNC: 8.6 MG/DL (ref 8.5–10.1)
CALCIUM SERPL-MCNC: 8.7 MG/DL (ref 8.5–10.1)
CALCIUM SERPL-MCNC: 8.8 MG/DL (ref 8.5–10.1)
CALCIUM SERPL-MCNC: 8.9 MG/DL (ref 8.5–10.1)
CALCIUM SERPL-MCNC: 9 MG/DL (ref 8.5–10.1)
CHLORIDE SERPL-SCNC: 96 MMOL/L (ref 97–108)
CHLORIDE SERPL-SCNC: 97 MMOL/L (ref 97–108)
CO2 SERPL-SCNC: 23 MMOL/L (ref 21–32)
CO2 SERPL-SCNC: 24 MMOL/L (ref 21–32)
CO2 SERPL-SCNC: 26 MMOL/L (ref 21–32)
COMMENT:: NORMAL
CREAT SERPL-MCNC: 0.67 MG/DL (ref 0.55–1.02)
CREAT SERPL-MCNC: 0.67 MG/DL (ref 0.55–1.02)
CREAT SERPL-MCNC: 0.72 MG/DL (ref 0.55–1.02)
CREAT SERPL-MCNC: 0.86 MG/DL (ref 0.55–1.02)
CREAT SERPL-MCNC: 0.89 MG/DL (ref 0.55–1.02)
DIFFERENTIAL METHOD BLD: ABNORMAL
ECHO AV PEAK GRADIENT: 7 MMHG
ECHO AV PEAK VELOCITY: 1.3 M/S
ECHO BSA: 1.82 M2
ECHO LV E' SEPTAL VELOCITY: 7 CM/S
ECHO LV FRACTIONAL SHORTENING: 45 % (ref 28–44)
ECHO LV INTERNAL DIMENSION DIASTOLE INDEX: 1.9 CM/M2
ECHO LV INTERNAL DIMENSION DIASTOLIC: 3.3 CM (ref 3.9–5.3)
ECHO LV INTERNAL DIMENSION SYSTOLIC INDEX: 1.03 CM/M2
ECHO LV INTERNAL DIMENSION SYSTOLIC: 1.8 CM
ECHO LV IVSD: 1.1 CM (ref 0.6–0.9)
ECHO LV MASS 2D: 141.6 G (ref 67–162)
ECHO LV MASS INDEX 2D: 81.4 G/M2 (ref 43–95)
ECHO LV POSTERIOR WALL DIASTOLIC: 1.5 CM (ref 0.6–0.9)
ECHO LV RELATIVE WALL THICKNESS RATIO: 0.91
ECHO MV A VELOCITY: 0.56 M/S
ECHO MV E VELOCITY: 0.57 M/S
ECHO MV E/A RATIO: 1.02
ECHO MV E/E' SEPTAL: 8.14
EOSINOPHIL # BLD: 0.1 K/UL (ref 0–0.4)
EOSINOPHIL NFR BLD: 2 % (ref 0–7)
ERYTHROCYTE [DISTWIDTH] IN BLOOD BY AUTOMATED COUNT: 14 % (ref 11.5–14.5)
GLUCOSE SERPL-MCNC: 116 MG/DL (ref 65–100)
GLUCOSE SERPL-MCNC: 116 MG/DL (ref 65–100)
GLUCOSE SERPL-MCNC: 82 MG/DL (ref 65–100)
GLUCOSE SERPL-MCNC: 83 MG/DL (ref 65–100)
GLUCOSE SERPL-MCNC: 97 MG/DL (ref 65–100)
HCT VFR BLD AUTO: 36.1 % (ref 35–47)
HGB BLD-MCNC: 12.1 G/DL (ref 11.5–16)
IMM GRANULOCYTES # BLD AUTO: 0 K/UL (ref 0–0.04)
IMM GRANULOCYTES NFR BLD AUTO: 1 % (ref 0–0.5)
LYMPHOCYTES # BLD: 0.9 K/UL (ref 0.8–3.5)
LYMPHOCYTES NFR BLD: 20 % (ref 12–49)
MCH RBC QN AUTO: 27.9 PG (ref 26–34)
MCHC RBC AUTO-ENTMCNC: 33.5 G/DL (ref 30–36.5)
MCV RBC AUTO: 83.4 FL (ref 80–99)
MONOCYTES # BLD: 1 K/UL (ref 0–1)
MONOCYTES NFR BLD: 21 % (ref 5–13)
NEUTS SEG # BLD: 2.7 K/UL (ref 1.8–8)
NEUTS SEG NFR BLD: 55 % (ref 32–75)
NRBC # BLD: 0 K/UL (ref 0–0.01)
NRBC BLD-RTO: 0 PER 100 WBC
PHOSPHATE SERPL-MCNC: 2.5 MG/DL (ref 2.6–4.7)
PHOSPHATE SERPL-MCNC: 2.9 MG/DL (ref 2.6–4.7)
PHOSPHATE SERPL-MCNC: 3 MG/DL (ref 2.6–4.7)
PLATELET # BLD AUTO: 319 K/UL (ref 150–400)
PMV BLD AUTO: 9.4 FL (ref 8.9–12.9)
POTASSIUM SERPL-SCNC: 5.1 MMOL/L (ref 3.5–5.1)
POTASSIUM SERPL-SCNC: 5.3 MMOL/L (ref 3.5–5.1)
POTASSIUM SERPL-SCNC: 5.3 MMOL/L (ref 3.5–5.1)
POTASSIUM SERPL-SCNC: 5.5 MMOL/L (ref 3.5–5.1)
POTASSIUM SERPL-SCNC: 5.5 MMOL/L (ref 3.5–5.1)
RBC # BLD AUTO: 4.33 M/UL (ref 3.8–5.2)
RBC MORPH BLD: ABNORMAL
SODIUM SERPL-SCNC: 127 MMOL/L (ref 136–145)
SODIUM SERPL-SCNC: 128 MMOL/L (ref 136–145)
SODIUM SERPL-SCNC: 128 MMOL/L (ref 136–145)
SPECIMEN HOLD: NORMAL
WBC # BLD AUTO: 4.7 K/UL (ref 3.6–11)

## 2024-07-24 PROCEDURE — 80048 BASIC METABOLIC PNL TOTAL CA: CPT

## 2024-07-24 PROCEDURE — 80069 RENAL FUNCTION PANEL: CPT

## 2024-07-24 PROCEDURE — 97110 THERAPEUTIC EXERCISES: CPT

## 2024-07-24 PROCEDURE — 93306 TTE W/DOPPLER COMPLETE: CPT

## 2024-07-24 PROCEDURE — 97116 GAIT TRAINING THERAPY: CPT

## 2024-07-24 PROCEDURE — 6370000000 HC RX 637 (ALT 250 FOR IP): Performed by: STUDENT IN AN ORGANIZED HEALTH CARE EDUCATION/TRAINING PROGRAM

## 2024-07-24 PROCEDURE — 2060000000 HC ICU INTERMEDIATE R&B

## 2024-07-24 PROCEDURE — 2580000003 HC RX 258: Performed by: NURSE PRACTITIONER

## 2024-07-24 PROCEDURE — 85025 COMPLETE CBC W/AUTO DIFF WBC: CPT

## 2024-07-24 PROCEDURE — 93306 TTE W/DOPPLER COMPLETE: CPT | Performed by: SPECIALIST

## 2024-07-24 PROCEDURE — 6370000000 HC RX 637 (ALT 250 FOR IP): Performed by: INTERNAL MEDICINE

## 2024-07-24 PROCEDURE — 97535 SELF CARE MNGMENT TRAINING: CPT

## 2024-07-24 PROCEDURE — 6370000000 HC RX 637 (ALT 250 FOR IP): Performed by: NURSE PRACTITIONER

## 2024-07-24 PROCEDURE — 36415 COLL VENOUS BLD VENIPUNCTURE: CPT

## 2024-07-24 RX ORDER — DOXYCYCLINE HYCLATE 100 MG
100 TABLET ORAL EVERY 12 HOURS SCHEDULED
Status: DISCONTINUED | OUTPATIENT
Start: 2024-07-24 | End: 2024-07-26 | Stop reason: HOSPADM

## 2024-07-24 RX ADMIN — Medication 15 G: at 09:09

## 2024-07-24 RX ADMIN — ACETAMINOPHEN 650 MG: 325 TABLET ORAL at 06:12

## 2024-07-24 RX ADMIN — PRAVASTATIN SODIUM 20 MG: 20 TABLET ORAL at 20:16

## 2024-07-24 RX ADMIN — DOXYCYCLINE HYCLATE 100 MG: 100 TABLET, COATED ORAL at 09:09

## 2024-07-24 RX ADMIN — ACETAMINOPHEN 650 MG: 325 TABLET ORAL at 11:04

## 2024-07-24 RX ADMIN — TIMOLOL MALEATE 1 DROP: 5 SOLUTION/ DROPS OPHTHALMIC at 09:09

## 2024-07-24 RX ADMIN — ACETAMINOPHEN 650 MG: 325 TABLET ORAL at 16:31

## 2024-07-24 RX ADMIN — FLECAINIDE ACETATE 50 MG: 50 TABLET ORAL at 20:16

## 2024-07-24 RX ADMIN — TIMOLOL MALEATE 1 DROP: 5 SOLUTION/ DROPS OPHTHALMIC at 20:16

## 2024-07-24 RX ADMIN — FLECAINIDE ACETATE 50 MG: 50 TABLET ORAL at 09:09

## 2024-07-24 RX ADMIN — SODIUM CHLORIDE, PRESERVATIVE FREE 10 ML: 5 INJECTION INTRAVENOUS at 09:10

## 2024-07-24 RX ADMIN — LOSARTAN POTASSIUM 50 MG: 50 TABLET, FILM COATED ORAL at 09:09

## 2024-07-24 RX ADMIN — DOXYCYCLINE HYCLATE 100 MG: 100 TABLET, COATED ORAL at 20:16

## 2024-07-24 RX ADMIN — RIVAROXABAN 20 MG: 20 TABLET, FILM COATED ORAL at 16:31

## 2024-07-24 RX ADMIN — SODIUM CHLORIDE, PRESERVATIVE FREE 10 ML: 5 INJECTION INTRAVENOUS at 20:16

## 2024-07-24 NOTE — PLAN OF CARE
Problem: Physical Therapy - Adult  Goal: By Discharge: Performs mobility at highest level of function for planned discharge setting.  See evaluation for individualized goals.  Description: FUNCTIONAL STATUS PRIOR TO ADMISSION: Patient was modified independent using a rolling walker and single point cane for functional mobility.    HOME SUPPORT PRIOR TO ADMISSION: The patient lived with sister but did not require assistance.    Physical Therapy Goals  Initiated 7/23/2024  1.  Patient will move from supine to sit and sit to supine, scoot up and down, and roll side to side in bed with independence within 7 day(s).    2.  Patient will perform sit to stand with modified independence within 7 day(s).  3.  Patient will transfer from bed to chair and chair to bed with modified independence using the least restrictive device within 7 day(s).  4.  Patient will ambulate with modified independence for 150 feet with the least restrictive device within 7 day(s).     Outcome: Progressing     PHYSICAL THERAPY TREATMENT    Patient: Elizabeth Kay (89 y.o. female)  Date: 7/24/2024  Diagnosis: Acute hyponatremia [E87.1]  Hyperkalemia [E87.5]  Hyponatremia [E87.1]  Generalized weakness [R53.1]  Congestive heart failure, unspecified HF chronicity, unspecified heart failure type (HCC) [I50.9] Acute hyponatremia      Precautions: Fall Risk                      ASSESSMENT:  Patient continues to benefit from skilled PT services and is progressing towards goals. Pt received in chair, eager to gain back her strength. Pt tolerates sit to stand and able to mobilize to the toilet with CGA-SBA. Pt voids and performs hygiene well. Pt able to complete gait training in the hallway for 200ft with minimal exertion. Pt returned to supine, very happy with progression as she states that she will need to care for self at home. Will continue to follow.        PLAN:  Patient continues to benefit from skilled intervention to address the above

## 2024-07-24 NOTE — PROGRESS NOTES
Patient alert and oriented x4.  Patient slightly "[employer].  Trace edema to BLEs.  Patient works with therapy for ADLs and ambulation.  Patient up to chair and continues to deny pain.  RN will continue to monitor

## 2024-07-24 NOTE — PROGRESS NOTES
mL IntraVENous PRN    0.9 % sodium chloride infusion   IntraVENous PRN    potassium chloride (KLOR-CON) extended release tablet 40 mEq  40 mEq Oral PRN    Or    potassium bicarb-citric acid (EFFER-K) effervescent tablet 40 mEq  40 mEq Oral PRN    Or    potassium chloride 10 mEq/100 mL IVPB (Peripheral Line)  10 mEq IntraVENous PRN    magnesium sulfate 2000 mg in 50 mL IVPB premix  2,000 mg IntraVENous PRN    ondansetron (ZOFRAN-ODT) disintegrating tablet 4 mg  4 mg Oral Q8H PRN    Or    ondansetron (ZOFRAN) injection 4 mg  4 mg IntraVENous Q6H PRN    polyethylene glycol (GLYCOLAX) packet 17 g  17 g Oral Daily PRN    acetaminophen (TYLENOL) tablet 650 mg  650 mg Oral Q6H PRN    Or    acetaminophen (TYLENOL) suppository 650 mg  650 mg Rectal Q6H PRN    [Held by provider] sulfamethoxazole-trimethoprim (BACTRIM DS;SEPTRA DS) 800-160 MG per tablet 1 tablet  1 tablet Oral 2 times per day      Allergies   Allergen Reactions    Naproxen Sodium      Other reaction(s): Unknown (comments)       Objective:  Vitals:    Vitals:    07/24/24 0411 07/24/24 0542 07/24/24 0831 07/24/24 1000   BP: 124/65  122/62    Pulse: 56 57 58 76   Resp: 18  18    Temp: 97.9 °F (36.6 °C)  97.5 °F (36.4 °C)    TempSrc: Oral  Oral    SpO2: 98%  99%    Weight:       Height:         Intake and Output:  07/24 0701 - 07/24 1900  In: 240 [P.O.:240]  Out: -   07/22 1901 - 07/24 0700  In: 840 [P.O.:840]  Out: 1550 [Urine:1550]    Physical Examination:  General: NAD,Conversant   Neck:  Supple, no mass  Resp:  Lungs CTA B/L, no wheezing , normal respiratory effort  CV:  RRR,  no murmur or rub, LE edema  GI:  Soft, NT, + BS, no HS megaly  Neurologic:  Non focal  Psych:             AAO x 3 appropriate affect   Skin:  No Rash      []    High complexity decision making was performed  []    Patient is at high-risk of decompensation with multiple organ involvement    Lab Data Personally Reviewed: I have reviewed all the pertinent labs, microbiology data and

## 2024-07-24 NOTE — WOUND CARE
Consulted for vaginal infection.   Admitted for weakness with hyponatremia.  Sitting up in recliner with no reports of pain.   Conversational and repositions herself easily.   She report no diarrhea and walked to bathroom with assist and walker earlier today.     Bilateral heels intact and elevated with foot of recliner.   No evidence of rash or skin irritation to groin or labia.     No wound care needs - will sign off.     DARREN Casey

## 2024-07-24 NOTE — PLAN OF CARE
Problem: Occupational Therapy - Adult  Goal: By Discharge: Performs self-care activities at highest level of function for planned discharge setting.  See evaluation for individualized goals.  Description: FUNCTIONAL STATUS PRIOR TO ADMISSION:  Patient was ambulatory using a cane typically but more recently has been using a RW due to weakness and was independent for ADLs and IADLs PTA.    ADL Assistance: Independent, Homemaking Assistance: Independent, Ambulation Assistance: Independent, Transfer Assistance: Independent,       HOME SUPPORT: Patient lived with her sister but didn't require assistance.    Occupational Therapy Goals:  Initiated 7/23/2024  1.  Patient will perform grooming standing at sink with Modified [place] within 7 day(s).  2.  Patient will perform lower body dressing using AD PRN with Minimal Assist within 7 day(s).  3.  Patient will perform bathing with Supervision within 7 day(s).  4.  Patient will perform toilet transfers with Modified [place]  within 7 day(s).  5.  Patient will perform all aspects of toileting with Modified [place] within 7 day(s).  6.  Patient will participate in upper extremity therapeutic exercise/activities with [place] for 10 minutes within 7 day(s).    7.  Patient will utilize energy conservation techniques during functional activities with verbal cues within 7 day(s).  Outcome: Progressing   OCCUPATIONAL THERAPY TREATMENT  Patient: Elizabeth Kay (89 y.o. female)  Date: 7/24/2024  Primary Diagnosis: Acute hyponatremia [E87.1]  Hyperkalemia [E87.5]  Hyponatremia [E87.1]  Generalized weakness [R53.1]  Congestive heart failure, unspecified HF chronicity, unspecified heart failure type (HCC) [I50.9]       Precautions: Fall Risk                Chart, occupational therapy assessment, plan of care, and goals were reviewed.    ASSESSMENT  Patient continues to benefit from skilled OT services and is progressing towards goals. Patient received semi supine

## 2024-07-24 NOTE — PROGRESS NOTES
BP: 110/60   Pulse: 63   Resp: 18   Temp: 97.7 °F (36.5 °C)   SpO2: 95%         Intake/Output Summary (Last 24 hours) at 7/24/2024 1231  Last data filed at 7/24/2024 0926  Gross per 24 hour   Intake 1080 ml   Output 1550 ml   Net -470 ml        Physical Examination:     I had a face to face encounter with this patient and independently examined them on 7/24/2024 as outlined below:          General : alert x 3, awake, no acute distress,   HEENT: PEERL, EOMI, moist mucus membrane  Neck: supple, no JVD, no meningeal signs  Chest: Clear to auscultation bilaterally   CVS: S1 S2 heard, Capillary refill less than 2 seconds  Abd: soft/ non tender, non distended, BS physiological,   Ext: no clubbing, no cyanosis, no edema, brisk 2+ DP pulses  Neuro/Psych: pleasant mood and affect, CN 2-12 grossly intact, sensory grossly within normal limit  Skin: warm     Data Review:    Review and/or order of clinical lab test  Review and/or order of tests in the radiology section of CPT  Review and/or order of tests in the medicine section of CPT      I have personally and independently reviewed all pertinent labs, diagnostic studies, imaging, and have provided independent interpretation of the same.     Labs:     Recent Labs     07/23/24 0115 07/24/24 0454   WBC 6.3 4.7   HGB 13.3 12.1   HCT 39.1 36.1    319     Recent Labs     07/23/24  0115 07/23/24  1205 07/23/24 2038 07/24/24 0454 07/24/24  0455   *  119* 123*  123* 124*  123* 128* 128*   K 5.3*  5.3* 4.3  4.3 4.5  4.5 5.5* 5.5*   CL 86*  85* 93*  92* 94*  94* 97 97   CO2 21  25 22  21 22  23 24 23   BUN 15  16 12  12 9  9 8 8   MG 1.9  --   --   --   --    PHOS 1.9* 2.2* 2.1* 3.0  --      Recent Labs     07/23/24  0115   ALT 17   GLOB 4.8*     No results for input(s): \"INR\", \"APTT\" in the last 72 hours.    Invalid input(s): \"PTP\"   No results for input(s): \"TIBC\" in the last 72 hours.    Invalid input(s): \"FE\", \"PSAT\", \"FERR\"   No results found for:

## 2024-07-25 LAB
ALBUMIN SERPL-MCNC: 2.7 G/DL (ref 3.5–5)
ALBUMIN SERPL-MCNC: 2.7 G/DL (ref 3.5–5)
ALBUMIN SERPL-MCNC: 2.9 G/DL (ref 3.5–5)
ANION GAP SERPL CALC-SCNC: 6 MMOL/L (ref 5–15)
ANION GAP SERPL CALC-SCNC: 7 MMOL/L (ref 5–15)
ANION GAP SERPL CALC-SCNC: 7 MMOL/L (ref 5–15)
ANION GAP SERPL CALC-SCNC: 8 MMOL/L (ref 5–15)
ANION GAP SERPL CALC-SCNC: 8 MMOL/L (ref 5–15)
BASOPHILS # BLD: 0 K/UL (ref 0–0.1)
BASOPHILS NFR BLD: 1 % (ref 0–1)
BUN SERPL-MCNC: 16 MG/DL (ref 6–20)
BUN SERPL-MCNC: 24 MG/DL (ref 6–20)
BUN SERPL-MCNC: 38 MG/DL (ref 6–20)
BUN/CREAT SERPL: 26 (ref 12–20)
BUN/CREAT SERPL: 27 (ref 12–20)
BUN/CREAT SERPL: 27 (ref 12–20)
BUN/CREAT SERPL: 30 (ref 12–20)
BUN/CREAT SERPL: 55 (ref 12–20)
CALCIUM SERPL-MCNC: 8.8 MG/DL (ref 8.5–10.1)
CALCIUM SERPL-MCNC: 8.9 MG/DL (ref 8.5–10.1)
CALCIUM SERPL-MCNC: 9.1 MG/DL (ref 8.5–10.1)
CHLORIDE SERPL-SCNC: 95 MMOL/L (ref 97–108)
CHLORIDE SERPL-SCNC: 96 MMOL/L (ref 97–108)
CHLORIDE SERPL-SCNC: 97 MMOL/L (ref 97–108)
CO2 SERPL-SCNC: 23 MMOL/L (ref 21–32)
CO2 SERPL-SCNC: 25 MMOL/L (ref 21–32)
CO2 SERPL-SCNC: 25 MMOL/L (ref 21–32)
CREAT SERPL-MCNC: 0.59 MG/DL (ref 0.55–1.02)
CREAT SERPL-MCNC: 0.6 MG/DL (ref 0.55–1.02)
CREAT SERPL-MCNC: 0.61 MG/DL (ref 0.55–1.02)
CREAT SERPL-MCNC: 0.69 MG/DL (ref 0.55–1.02)
CREAT SERPL-MCNC: 0.8 MG/DL (ref 0.55–1.02)
DIFFERENTIAL METHOD BLD: ABNORMAL
EOSINOPHIL # BLD: 0.1 K/UL (ref 0–0.4)
EOSINOPHIL NFR BLD: 2 % (ref 0–7)
ERYTHROCYTE [DISTWIDTH] IN BLOOD BY AUTOMATED COUNT: 14.3 % (ref 11.5–14.5)
GLUCOSE SERPL-MCNC: 83 MG/DL (ref 65–100)
GLUCOSE SERPL-MCNC: 87 MG/DL (ref 65–100)
GLUCOSE SERPL-MCNC: 93 MG/DL (ref 65–100)
GLUCOSE SERPL-MCNC: 95 MG/DL (ref 65–100)
GLUCOSE SERPL-MCNC: 96 MG/DL (ref 65–100)
HCT VFR BLD AUTO: 35.7 % (ref 35–47)
HGB BLD-MCNC: 11.8 G/DL (ref 11.5–16)
IMM GRANULOCYTES # BLD AUTO: 0 K/UL (ref 0–0.04)
IMM GRANULOCYTES NFR BLD AUTO: 1 % (ref 0–0.5)
LYMPHOCYTES # BLD: 0.9 K/UL (ref 0.8–3.5)
LYMPHOCYTES NFR BLD: 19 % (ref 12–49)
MCH RBC QN AUTO: 27.4 PG (ref 26–34)
MCHC RBC AUTO-ENTMCNC: 33.1 G/DL (ref 30–36.5)
MCV RBC AUTO: 83 FL (ref 80–99)
MONOCYTES # BLD: 1 K/UL (ref 0–1)
MONOCYTES NFR BLD: 23 % (ref 5–13)
NEUTS SEG # BLD: 2.5 K/UL (ref 1.8–8)
NEUTS SEG NFR BLD: 54 % (ref 32–75)
NRBC # BLD: 0 K/UL (ref 0–0.01)
NRBC BLD-RTO: 0 PER 100 WBC
PHOSPHATE SERPL-MCNC: 2.3 MG/DL (ref 2.6–4.7)
PHOSPHATE SERPL-MCNC: 2.8 MG/DL (ref 2.6–4.7)
PHOSPHATE SERPL-MCNC: 3.1 MG/DL (ref 2.6–4.7)
PLATELET # BLD AUTO: 320 K/UL (ref 150–400)
PMV BLD AUTO: 9.3 FL (ref 8.9–12.9)
POTASSIUM SERPL-SCNC: 4.7 MMOL/L (ref 3.5–5.1)
POTASSIUM SERPL-SCNC: 4.8 MMOL/L (ref 3.5–5.1)
POTASSIUM SERPL-SCNC: 5.1 MMOL/L (ref 3.5–5.1)
RBC # BLD AUTO: 4.3 M/UL (ref 3.8–5.2)
RBC MORPH BLD: ABNORMAL
SODIUM SERPL-SCNC: 126 MMOL/L (ref 136–145)
SODIUM SERPL-SCNC: 127 MMOL/L (ref 136–145)
SODIUM SERPL-SCNC: 128 MMOL/L (ref 136–145)
WBC # BLD AUTO: 4.5 K/UL (ref 3.6–11)

## 2024-07-25 PROCEDURE — 80069 RENAL FUNCTION PANEL: CPT

## 2024-07-25 PROCEDURE — 85025 COMPLETE CBC W/AUTO DIFF WBC: CPT

## 2024-07-25 PROCEDURE — 97530 THERAPEUTIC ACTIVITIES: CPT

## 2024-07-25 PROCEDURE — 97116 GAIT TRAINING THERAPY: CPT

## 2024-07-25 PROCEDURE — 2060000000 HC ICU INTERMEDIATE R&B

## 2024-07-25 PROCEDURE — 97535 SELF CARE MNGMENT TRAINING: CPT

## 2024-07-25 PROCEDURE — 6370000000 HC RX 637 (ALT 250 FOR IP): Performed by: INTERNAL MEDICINE

## 2024-07-25 PROCEDURE — 6370000000 HC RX 637 (ALT 250 FOR IP): Performed by: STUDENT IN AN ORGANIZED HEALTH CARE EDUCATION/TRAINING PROGRAM

## 2024-07-25 PROCEDURE — 6370000000 HC RX 637 (ALT 250 FOR IP): Performed by: NURSE PRACTITIONER

## 2024-07-25 PROCEDURE — 36415 COLL VENOUS BLD VENIPUNCTURE: CPT

## 2024-07-25 PROCEDURE — 97110 THERAPEUTIC EXERCISES: CPT

## 2024-07-25 PROCEDURE — 80048 BASIC METABOLIC PNL TOTAL CA: CPT

## 2024-07-25 PROCEDURE — 2580000003 HC RX 258: Performed by: NURSE PRACTITIONER

## 2024-07-25 RX ADMIN — SODIUM CHLORIDE, PRESERVATIVE FREE 10 ML: 5 INJECTION INTRAVENOUS at 20:35

## 2024-07-25 RX ADMIN — FLECAINIDE ACETATE 50 MG: 50 TABLET ORAL at 09:30

## 2024-07-25 RX ADMIN — Medication 15 G: at 09:30

## 2024-07-25 RX ADMIN — ACETAMINOPHEN 650 MG: 325 TABLET ORAL at 00:33

## 2024-07-25 RX ADMIN — DOXYCYCLINE HYCLATE 100 MG: 100 TABLET, COATED ORAL at 09:30

## 2024-07-25 RX ADMIN — LOSARTAN POTASSIUM 50 MG: 50 TABLET, FILM COATED ORAL at 09:30

## 2024-07-25 RX ADMIN — RIVAROXABAN 20 MG: 20 TABLET, FILM COATED ORAL at 19:14

## 2024-07-25 RX ADMIN — DOXYCYCLINE HYCLATE 100 MG: 100 TABLET, COATED ORAL at 20:28

## 2024-07-25 RX ADMIN — ACETAMINOPHEN 650 MG: 325 TABLET ORAL at 19:13

## 2024-07-25 RX ADMIN — PRAVASTATIN SODIUM 20 MG: 20 TABLET ORAL at 20:28

## 2024-07-25 RX ADMIN — FLECAINIDE ACETATE 50 MG: 50 TABLET ORAL at 20:28

## 2024-07-25 RX ADMIN — TIMOLOL MALEATE 1 DROP: 5 SOLUTION/ DROPS OPHTHALMIC at 20:29

## 2024-07-25 RX ADMIN — ACETAMINOPHEN 650 MG: 325 TABLET ORAL at 11:28

## 2024-07-25 RX ADMIN — ACETAMINOPHEN 650 MG: 325 TABLET ORAL at 05:58

## 2024-07-25 RX ADMIN — SODIUM CHLORIDE, PRESERVATIVE FREE 10 ML: 5 INJECTION INTRAVENOUS at 09:31

## 2024-07-25 RX ADMIN — TIMOLOL MALEATE 1 DROP: 5 SOLUTION/ DROPS OPHTHALMIC at 09:31

## 2024-07-25 ASSESSMENT — PAIN SCALES - GENERAL: PAINLEVEL_OUTOF10: 0

## 2024-07-25 NOTE — PROGRESS NOTES
Review of Systems:   Pertinent items are noted in HPI.       Vital Signs:    Last 24hrs VS reviewed since prior progress note. Most recent are:  Vitals:    07/25/24 1800   BP:    Pulse: 76   Resp:    Temp:    SpO2:          Intake/Output Summary (Last 24 hours) at 7/25/2024 1848  Last data filed at 7/25/2024 0449  Gross per 24 hour   Intake 300 ml   Output 775 ml   Net -475 ml          Physical Examination:     I had a face to face encounter with this patient and independently examined them on 7/25/2024 as outlined below:          General : alert x 3, awake, no acute distress,   HEENT: PEERL, EOMI, moist mucus membrane  Neck: supple, no JVD, no meningeal signs  Chest: Clear to auscultation bilaterally   CVS: S1 S2 heard, Capillary refill less than 2 seconds  Abd: soft/ non tender, non distended, BS physiological,   Ext: no clubbing, no cyanosis, no edema, brisk 2+ DP pulses  Neuro/Psych: pleasant mood and affect, CN 2-12 grossly intact, sensory grossly within normal limit  Skin: warm     Data Review:    Review and/or order of clinical lab test  Review and/or order of tests in the radiology section of CPT  Review and/or order of tests in the medicine section of CPT      I have personally and independently reviewed all pertinent labs, diagnostic studies, imaging, and have provided independent interpretation of the same.     Labs:     Recent Labs     07/24/24  0454 07/25/24  0643   WBC 4.7 4.5   HGB 12.1 11.8   HCT 36.1 35.7    320       Recent Labs     07/23/24  0115 07/23/24  1205 07/24/24 2014 07/25/24  0643 07/25/24  0644 07/25/24  1225   *  119*   < > 127*  127* 127* 128*  128* 128*   K 5.3*  5.3*   < > 5.3*  5.3* 4.8 4.8  4.8 5.1   CL 86*  85*   < > 97  96* 97 97  97 96*   CO2 21  25   < > 24  26 23 23  23 25   BUN 15  16   < > 22*  22* 16 16  16 38*   MG 1.9  --   --   --   --   --    PHOS 1.9*   < > 2.5*  --  3.1 2.8    < > = values in this interval not displayed.       Recent  Labs     07/23/24  0115   ALT 17   GLOB 4.8*       No results for input(s): \"INR\", \"APTT\" in the last 72 hours.    Invalid input(s): \"PTP\"   No results for input(s): \"TIBC\" in the last 72 hours.    Invalid input(s): \"FE\", \"PSAT\", \"FERR\"   No results found for: \"RBCF\"   No results for input(s): \"PH\", \"PCO2\", \"PO2\" in the last 72 hours.  No results for input(s): \"CPK\" in the last 72 hours.    Invalid input(s): \"CPKMB\", \"CKNDX\", \"TROIQ\"  No results found for: \"CHOL\", \"CHLST\", \"CHOLV\", \"HDL\", \"HDLC\", \"LDL\"  No results found for: \"GLUCPOC\"  [unfilled]    Notes reviewed from all clinical/nonclinical/nursing services involved in patient's clinical care. Care coordination discussions were held with appropriate clinical/nonclinical/ nursing providers based on care coordination needs.         Patients current active Medications were reviewed, considered, added and adjusted based on the clinical condition today.      Home Medications were reconciled to the best of my ability given all available resources at the time of admission. Route is PO if not otherwise noted.        Medications Reviewed:     Current Facility-Administered Medications   Medication Dose Route Frequency    urea (URE-NA) packet 15 g  15 g Oral Daily    doxycycline hyclate (VIBRA-TABS) tablet 100 mg  100 mg Oral 2 times per day    acetaminophen (TYLENOL) tablet 650 mg  650 mg Oral Q6H    flecainide (TAMBOCOR) tablet 50 mg  50 mg Oral Q12H    losartan (COZAAR) tablet 50 mg  50 mg Oral Daily    pravastatin (PRAVACHOL) tablet 20 mg  20 mg Oral Nightly    rivaroxaban (XARELTO) tablet 20 mg  20 mg Oral Dinner    timolol (TIMOPTIC) 0.5 % ophthalmic solution 1 drop  1 drop Both Eyes BID    sodium chloride flush 0.9 % injection 5-40 mL  5-40 mL IntraVENous 2 times per day    sodium chloride flush 0.9 % injection 5-40 mL  5-40 mL IntraVENous PRN    0.9 % sodium chloride infusion   IntraVENous PRN    potassium chloride (KLOR-CON) extended release tablet 40 mEq  40 mEq Oral

## 2024-07-25 NOTE — PROGRESS NOTES
[address], [facility] [place] [address]  Phone: [phone]   [phone]    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 No new sx   Denies any N/V/CP/SOB    Review of Systems: A comprehensive review of systems was negative.    Current Medications:   Current Facility-Administered Medications   Medication Dose Route Frequency    urea (URE-NA) packet 15 g  15 g Oral Daily    doxycycline hyclate (VIBRA-TABS) tablet 100 mg  100 mg Oral 2 times per day    acetaminophen (TYLENOL) tablet 650 mg  650 mg Oral Q6H    flecainide (TAMBOCOR) tablet 50 mg  50 mg Oral Q12H    losartan (COZAAR) tablet 50 mg  50 mg Oral Daily    pravastatin (PRAVACHOL) tablet 20 mg  20 mg Oral Nightly    rivaroxaban (XARELTO) tablet 20 mg  20 mg Oral Dinner    timolol (TIMOPTIC) 0.5 % ophthalmic solution 1 drop  1 drop Both Eyes BID    sodium chloride flush 0.9 % injection 5-40 mL  5-40 mL IntraVENous 2 times per day    sodium chloride flush 0.9 % injection 5-40 mL  5-40 mL IntraVENous PRN    0.9 % sodium chloride infusion   IntraVENous PRN    potassium chloride (KLOR-CON) extended release tablet 40 mEq  40 mEq Oral PRN    Or    potassium bicarb-citric acid (EFFER-K) effervescent tablet 40 mEq  40 mEq Oral PRN    Or    potassium chloride 10 mEq/100 mL IVPB (Peripheral Line)  10 mEq IntraVENous PRN    magnesium sulfate 2000 mg in 50

## 2024-07-25 NOTE — PLAN OF CARE
Problem: Occupational Therapy - Adult  Goal: By Discharge: Performs self-care activities at highest level of function for planned discharge setting.  See evaluation for individualized goals.  Description: FUNCTIONAL STATUS PRIOR TO ADMISSION:  Patient was ambulatory using a cane typically but more recently has been using a RW due to weakness and was independent for ADLs and IADLs PTA.    ADL Assistance: Independent, Homemaking Assistance: Independent, Ambulation Assistance: Independent, Transfer Assistance: Independent,       HOME SUPPORT: Patient lived with her sister but didn't require assistance.    Occupational Therapy Goals:  Initiated 7/23/2024  1.  Patient will perform grooming standing at sink with Modified [place] within 7 day(s).  2.  Patient will perform lower body dressing using AD PRN with Minimal Assist within 7 day(s).  3.  Patient will perform bathing with Supervision within 7 day(s).  4.  Patient will perform toilet transfers with Modified [place]  within 7 day(s).  5.  Patient will perform all aspects of toileting with Modified [place] within 7 day(s).  6.  Patient will participate in upper extremity therapeutic exercise/activities with [place] for 10 minutes within 7 day(s).    7.  Patient will utilize energy conservation techniques during functional activities with verbal cues within 7 day(s).  Outcome: Progressing   OCCUPATIONAL THERAPY TREATMENT  Patient: Elizabeth Kay (89 y.o. female)  Date: 7/25/2024  Primary Diagnosis: Acute hyponatremia [E87.1]  Hyperkalemia [E87.5]  Hyponatremia [E87.1]  Generalized weakness [R53.1]  Congestive heart failure, unspecified HF chronicity, unspecified heart failure type (HCC) [I50.9]       Precautions: Fall Risk                Chart, occupational therapy assessment, plan of care, and goals were reviewed.    ASSESSMENT  Patient continues to benefit from skilled OT services and is progressing towards goals. Patient received OOB in

## 2024-07-25 NOTE — PLAN OF CARE
Problem: Physical Therapy - Adult  Goal: By Discharge: Performs mobility at highest level of function for planned discharge setting.  See evaluation for individualized goals.  Description: FUNCTIONAL STATUS PRIOR TO ADMISSION: Patient was modified independent using a rolling walker and single point cane for functional mobility.    HOME SUPPORT PRIOR TO ADMISSION: The patient lived with sister but did not require assistance.    Physical Therapy Goals  Initiated 7/23/2024  1.  Patient will move from supine to sit and sit to supine, scoot up and down, and roll side to side in bed with independence within 7 day(s).    2.  Patient will perform sit to stand with modified independence within 7 day(s).  3.  Patient will transfer from bed to chair and chair to bed with modified independence using the least restrictive device within 7 day(s).  4.  Patient will ambulate with modified independence for 150 feet with the least restrictive device within 7 day(s).     Outcome: Progressing     PHYSICAL THERAPY TREATMENT    Patient: Elizabeth Kay (89 y.o. female)  Date: 7/25/2024  Diagnosis: Acute hyponatremia [E87.1]  Hyperkalemia [E87.5]  Hyponatremia [E87.1]  Generalized weakness [R53.1]  Congestive heart failure, unspecified HF chronicity, unspecified heart failure type (HCC) [I50.9] Acute hyponatremia      Precautions: Fall Risk                    ASSESSMENT:  Patient continues to benefit from skilled PT services and is progressing towards goals. Overall mobilizing at stand-by-assist level with rolling walker for hallway ambulation with intermittent cues for assistive device management. Patient reports decreased energy compared to baseline. She is mobilizing well enough to return home with family and HHPT. Will continue to benefit from Acute PT for further activity progression.     PLAN:  Patient continues to benefit from skilled intervention to address the above impairments.  Continue treatment per established plan of  care.    Recommend with staff: therapy recommendations for staff: Recommend mobility with staff assist x1 using rolling walker.    Recommend for next PT session: further progression of gait with existing device    Recommendation for discharge: (in order for the patient to meet his/her long term goals): Therapy 2x a week in the home    Other factors to consider for discharge: no additional factors    IF patient discharges home will need the following DME: patient owns DME required for discharge     SUBJECTIVE:   Patient stated, \"I need to be able to do for my self.\"    OBJECTIVE DATA SUMMARY:   Critical Behavior:  Orientation  Overall Orientation Status: Within Normal Limits  Cognition  Overall Cognitive Status: WNL    Functional Mobility Training:  Bed Mobility:  Bed Mobility Training  Supine to Sit: Stand-by assistance;Additional time;Adaptive equipment (HOB elevated)  Scooting: Stand-by assistance;Additional time  Transfers:  Transfer Training  Sit to Stand: Stand-by assistance;Supervision  Stand to Sit: Stand-by assistance;Supervision;Adaptive equipment  Toilet Transfer: Stand-by assistance;Adaptive equipment  Balance:  Balance  Sitting: Intact  Standing: Impaired  Standing - Static: Good;Constant support  Standing - Dynamic: Good;Constant support   Ambulation/Gait Training:     Gait  Overall Level of Assistance: Stand-by assistance;Supervision  Distance (ft): 200 Feet  Assistive Device: Walker, rolling  Interventions: Verbal cues  Base of Support: Narrowed  Speed/[place]: Slow  Step Length: Left shortened;Right shortened  Gait Abnormalities: Decreased step clearance (forward flexed)                Pain Rating:  Did not interfere    Activity Tolerance:   Fair     After treatment:   Patient left in no apparent distress sitting up in chair, Call bell within reach, and Bed/ chair alarm activated    COMMUNICATION/EDUCATION:   The patient's plan of care was discussed with: occupational therapist and registered

## 2024-07-25 NOTE — PLAN OF CARE
Problem: Safety - Adult  Goal: Free from fall injury  7/25/2024 1031 by Kaylah Montesinos, RN  Outcome: Progressing  Flowsheets (Taken 7/25/2024 1030)  Free From Fall Injury: Instruct family/caregiver on patient safety  7/24/2024 2214 by Carisa Abbasi  Outcome: Progressing  Flowsheets (Taken 7/24/2024 2000)  Free From Fall Injury: Instruct family/caregiver on patient safety     Problem: Discharge Planning  Goal: Discharge to home or other facility with appropriate resources  Recent Flowsheet Documentation  Taken 7/25/2024 0800 by Kaylah Montesinos, RN  Discharge to home or other facility with appropriate resources: Identify barriers to discharge with patient and caregiver  7/24/2024 2214 by Carisa Abbasi  Outcome: Progressing  Flowsheets (Taken 7/24/2024 2000)  Discharge to home or other facility with appropriate resources:   Identify barriers to discharge with patient and caregiver   Arrange for needed discharge resources and transportation as appropriate

## 2024-07-25 NOTE — CARE COORDINATION
Care Management Initial Assessment       RUR:11%  Readmission? No  1st IM letter given? Yes - 7/25/24  1st  letter given: No    Anticipate d/c home with [facility] RN/PT/OT,  agency added to AVS;    Daughter transport    -Severe hyponatremia  -hyperkalemia  -weakness  -history of: HTN, CHF, AFIB, Skin infection    CM met with patient at bedside to introduce self and role. Pt resides with her daughter in a two story home with ramped entrance and 2nd floor bedroom. Pt has a lift chair to access 2nd floor bedroom. Pt reported she was open to [facility] but does not want to continue with their agency at d/c, due to staffing concerns. She requested CM send referral to another agency and is agreeable to [facility] since they are in network with her insurance. Referral sent and noted they have accepted.    ADLs: Independent with RW and cane  DME: RW, cane, shower chair, lift chair  PCP follow up: Dr. Valentina Osorio, as directed  Previous Home Health: [facility], bill does not want to continue with this agency at d/c due to staffing concerns  Previous Skilled Nursing Facility: denies  Previous Inpatient Rehab: yes, does not recall name of IPR  Insurance verified: [employer]  Pharmacy: [facility] order and local pharmacy as needed, no copay concerns  Emergency Contact: DaughterMaricruz, [phone]    CM will follow patient progress and assist as needed with ABDULAZIZ plan.       07/25/24 1524   Service Assessment   Patient Orientation Alert and Oriented;Person;Place;Situation;Self   Cognition Alert   History Provided By Patient   Primary Caregiver Self   Support Systems Children   Patient's Healthcare Decision Maker is: Legal Next of Kin   PCP Verified by CM Yes  (DR. VALENTINA OSORIO)   Last Visit to PCP Within last 3 months   Prior Functional Level Assistance with the following:;Mobility;Shopping;Housework;Cooking   Current Functional Level Assistance with the  following:;Mobility;Housework;Shopping;Cooking   Can patient return to prior living arrangement Yes   Ability to make needs known: Good   Family able to assist with home care needs: Yes   Would you like for me to discuss the discharge plan with any other family members/significant others, and if so, who? No   Financial Resources Medicare   Social/Functional History   Lives With Daughter   Type of Home House   Home Layout Two level   Home Access Ramped entrance   Bathroom Equipment Shower chair   Home Equipment Walker - Rolling;Cane;Lift chair   Receives Help From Family   Occupation Retired   Discharge Planning   Type of Residence House   Current Services Prior To Admission Home Care   Potential Assistance Needed Home Care   DME Ordered? No   Potential Assistance Purchasing Medications No   Type of Home Care Services OT;PT;Nursing Services   Patient expects to be discharged to: House   Services At/After Discharge   Transition of Care Consult (CM Consult) Discharge Planning;Home Health   Internal Home Health No   Reason Outside Agency Chosen Patient already serviced by other home care/hospice agency   Services At/After Discharge Home Health   Confirm Follow Up Transport Family   Condition of Participation: Discharge Planning   The Plan for Transition of Care is related to the following treatment goals: hh   The Patient and/or Patient Representative was provided with a Choice of Provider? Patient   The Patient and/Or Patient Representative agree with the Discharge Plan? Yes   Freedom of Choice list was provided with basic dialogue that supports the patient's individualized plan of care/goals, treatment preferences, and shares the quality data associated with the providers?  Yes

## 2024-07-26 VITALS
HEART RATE: 62 BPM | DIASTOLIC BLOOD PRESSURE: 65 MMHG | WEIGHT: 175.49 LBS | BODY MASS INDEX: 35.38 KG/M2 | RESPIRATION RATE: 18 BRPM | TEMPERATURE: 97.7 F | SYSTOLIC BLOOD PRESSURE: 110 MMHG | HEIGHT: 59 IN | OXYGEN SATURATION: 97 %

## 2024-07-26 LAB
ALBUMIN SERPL-MCNC: 2.6 G/DL (ref 3.5–5)
ANION GAP SERPL CALC-SCNC: 6 MMOL/L (ref 5–15)
ANION GAP SERPL CALC-SCNC: 7 MMOL/L (ref 5–15)
BASOPHILS # BLD: 0 K/UL (ref 0–0.1)
BASOPHILS NFR BLD: 1 % (ref 0–1)
BUN SERPL-MCNC: 18 MG/DL (ref 6–20)
BUN SERPL-MCNC: 18 MG/DL (ref 6–20)
BUN/CREAT SERPL: 30 (ref 12–20)
BUN/CREAT SERPL: 32 (ref 12–20)
CALCIUM SERPL-MCNC: 9.1 MG/DL (ref 8.5–10.1)
CALCIUM SERPL-MCNC: 9.3 MG/DL (ref 8.5–10.1)
CHLORIDE SERPL-SCNC: 96 MMOL/L (ref 97–108)
CHLORIDE SERPL-SCNC: 97 MMOL/L (ref 97–108)
CO2 SERPL-SCNC: 24 MMOL/L (ref 21–32)
CO2 SERPL-SCNC: 25 MMOL/L (ref 21–32)
CREAT SERPL-MCNC: 0.57 MG/DL (ref 0.55–1.02)
CREAT SERPL-MCNC: 0.6 MG/DL (ref 0.55–1.02)
DIFFERENTIAL METHOD BLD: ABNORMAL
EOSINOPHIL # BLD: 0.1 K/UL (ref 0–0.4)
EOSINOPHIL NFR BLD: 2 % (ref 0–7)
ERYTHROCYTE [DISTWIDTH] IN BLOOD BY AUTOMATED COUNT: 14.3 % (ref 11.5–14.5)
GLUCOSE SERPL-MCNC: 102 MG/DL (ref 65–100)
GLUCOSE SERPL-MCNC: 98 MG/DL (ref 65–100)
HCT VFR BLD AUTO: 38.3 % (ref 35–47)
HGB BLD-MCNC: 12.5 G/DL (ref 11.5–16)
IMM GRANULOCYTES # BLD AUTO: 0 K/UL (ref 0–0.04)
IMM GRANULOCYTES NFR BLD AUTO: 0 % (ref 0–0.5)
LYMPHOCYTES # BLD: 1.2 K/UL (ref 0.8–3.5)
LYMPHOCYTES NFR BLD: 25 % (ref 12–49)
MCH RBC QN AUTO: 27.3 PG (ref 26–34)
MCHC RBC AUTO-ENTMCNC: 32.6 G/DL (ref 30–36.5)
MCV RBC AUTO: 83.6 FL (ref 80–99)
MONOCYTES # BLD: 0.7 K/UL (ref 0–1)
MONOCYTES NFR BLD: 15 % (ref 5–13)
NEUTS SEG # BLD: 2.7 K/UL (ref 1.8–8)
NEUTS SEG NFR BLD: 57 % (ref 32–75)
NRBC # BLD: 0 K/UL (ref 0–0.01)
NRBC BLD-RTO: 0 PER 100 WBC
PHOSPHATE SERPL-MCNC: 2.9 MG/DL (ref 2.6–4.7)
PLATELET # BLD AUTO: 322 K/UL (ref 150–400)
PMV BLD AUTO: 9 FL (ref 8.9–12.9)
POTASSIUM SERPL-SCNC: 4.4 MMOL/L (ref 3.5–5.1)
POTASSIUM SERPL-SCNC: 4.8 MMOL/L (ref 3.5–5.1)
RBC # BLD AUTO: 4.58 M/UL (ref 3.8–5.2)
SODIUM SERPL-SCNC: 127 MMOL/L (ref 136–145)
SODIUM SERPL-SCNC: 128 MMOL/L (ref 136–145)
WBC # BLD AUTO: 4.7 K/UL (ref 3.6–11)

## 2024-07-26 PROCEDURE — 80048 BASIC METABOLIC PNL TOTAL CA: CPT

## 2024-07-26 PROCEDURE — 6370000000 HC RX 637 (ALT 250 FOR IP): Performed by: STUDENT IN AN ORGANIZED HEALTH CARE EDUCATION/TRAINING PROGRAM

## 2024-07-26 PROCEDURE — 6370000000 HC RX 637 (ALT 250 FOR IP): Performed by: NURSE PRACTITIONER

## 2024-07-26 PROCEDURE — 85025 COMPLETE CBC W/AUTO DIFF WBC: CPT

## 2024-07-26 PROCEDURE — 6370000000 HC RX 637 (ALT 250 FOR IP): Performed by: INTERNAL MEDICINE

## 2024-07-26 PROCEDURE — 36415 COLL VENOUS BLD VENIPUNCTURE: CPT

## 2024-07-26 PROCEDURE — 80069 RENAL FUNCTION PANEL: CPT

## 2024-07-26 PROCEDURE — 2580000003 HC RX 258: Performed by: NURSE PRACTITIONER

## 2024-07-26 RX ORDER — DOXYCYCLINE HYCLATE 100 MG
100 TABLET ORAL EVERY 12 HOURS SCHEDULED
Qty: 3 TABLET | Refills: 0 | Status: SHIPPED | OUTPATIENT
Start: 2024-07-26 | End: 2024-07-28

## 2024-07-26 RX ADMIN — ACETAMINOPHEN 650 MG: 325 TABLET ORAL at 06:58

## 2024-07-26 RX ADMIN — Medication 15 G: at 08:24

## 2024-07-26 RX ADMIN — SODIUM CHLORIDE, PRESERVATIVE FREE 10 ML: 5 INJECTION INTRAVENOUS at 08:24

## 2024-07-26 RX ADMIN — FLECAINIDE ACETATE 50 MG: 50 TABLET ORAL at 08:24

## 2024-07-26 RX ADMIN — TIMOLOL MALEATE 1 DROP: 5 SOLUTION/ DROPS OPHTHALMIC at 08:24

## 2024-07-26 RX ADMIN — ACETAMINOPHEN 650 MG: 325 TABLET ORAL at 12:59

## 2024-07-26 RX ADMIN — LOSARTAN POTASSIUM 50 MG: 50 TABLET, FILM COATED ORAL at 08:24

## 2024-07-26 RX ADMIN — DOXYCYCLINE HYCLATE 100 MG: 100 TABLET, COATED ORAL at 08:24

## 2024-07-26 RX ADMIN — ACETAMINOPHEN 650 MG: 325 TABLET ORAL at 00:15

## 2024-07-26 ASSESSMENT — PAIN SCALES - GENERAL: PAINLEVEL_OUTOF10: 0

## 2024-07-26 NOTE — PLAN OF CARE
Problem: Safety - Adult  Goal: Free from fall injury  Recent Flowsheet Documentation  Taken 7/26/2024 1120 by Kaylah Montesinos, RN  Free From Fall Injury: Instruct family/caregiver on patient safety  7/25/2024 2346 by Ibrahima Bullock, RN  Outcome: Progressing

## 2024-07-26 NOTE — DISCHARGE INSTRUCTIONS
Discharge Instructions       PATIENT ID: Elizabeth Kay  MRN: [id number]   YOB: 1930    DATE OF ADMISSION: 7/23/2024   DATE OF DISCHARGE: 7/26/2024    PRIMARY CARE PROVIDER: Valentina Hills     ATTENDING PHYSICIAN: Dex Ortiz MD   DISCHARGING PROVIDER: Dex Ortiz MD    To contact this individual call [phone] and ask the  to page.   If unavailable ask to be transferred the Adult Hospitalist Department.    DISCHARGE DIAGNOSES   Hyponatremia    CONSULTATIONS:  Nephrology    PROCEDURES/SURGERIES: * No surgery found *    PENDING TEST RESULTS:   At the time of discharge the following test results are still pending: None    FOLLOW UP APPOINTMENTS:   PCP in 1 week to repeat labs: CBC, BMP, Mg, Phos;   Nephrology is hyponatremia persists;     ADDITIONAL CARE RECOMMENDATIONS:   Please resume all your home medications as prior to admission;     DIET: regular diet  Fluid restriction: 1.5 liters (about 6 cups) per day;     ACTIVITY: activity as tolerated  PT/OT to evaluate and treat  Fall precautions    WOUND CARE: n/a    EQUIPMENT needed: Per Home Health      DISCHARGE MEDICATIONS:   See Medication Reconciliation Form    It is important that you take the medication exactly as they are prescribed.   Keep your medication in the bottles provided by the pharmacist and keep a list of the medication names, dosages, and times to be taken in your wallet.   Do not take other medications without consulting your doctor.       NOTIFY YOUR PHYSICIAN FOR ANY OF THE FOLLOWING:   Fever over 101 degrees for 24 hours.   Chest pain, shortness of breath, fever, chills, nausea, vomiting, diarrhea, change in mentation, falling, weakness, bleeding. Severe pain or pain not relieved by medications.  Or, any other signs or symptoms that you may have questions about.      DISPOSITION:   x Home With:   OT  PT x HH  RN       SNF/Inpatient Rehab/LTAC    Independent/assisted living    Hospice    Other:

## 2024-07-26 NOTE — PLAN OF CARE
Problem: Safety - Adult  Goal: Free from fall injury  Outcome: Adequate for Discharge  Flowsheets (Taken 7/26/2024 1120)  Free From Fall Injury: Instruct family/caregiver on patient safety     Problem: Discharge Planning  Goal: Discharge to home or other facility with appropriate resources  7/26/2024 1519 by Kaylah Montesinos, RN  Outcome: Adequate for Discharge  7/26/2024 1121 by Kaylah Montesinos RN  Outcome: Progressing  Flowsheets (Taken 7/26/2024 0818)  Discharge to home or other facility with appropriate resources:   Identify barriers to discharge with patient and caregiver   Arrange for needed discharge resources and transportation as appropriate     Problem: ABCDS Injury Assessment  Goal: Absence of physical injury  Outcome: Adequate for Discharge

## 2024-07-26 NOTE — PLAN OF CARE
Problem: Physical Therapy - Adult  Goal: By Discharge: Performs mobility at highest level of function for planned discharge setting.  See evaluation for individualized goals.  Description: FUNCTIONAL STATUS PRIOR TO ADMISSION: Patient was modified independent using a rolling walker and single point cane for functional mobility.    HOME SUPPORT PRIOR TO ADMISSION: The patient lived with sister but did not require assistance.    Physical Therapy Goals  Initiated 7/23/2024  1.  Patient will move from supine to sit and sit to supine, scoot up and down, and roll side to side in bed with independence within 7 day(s).    2.  Patient will perform sit to stand with modified independence within 7 day(s).  3.  Patient will transfer from bed to chair and chair to bed with modified independence using the least restrictive device within 7 day(s).  4.  Patient will ambulate with modified independence for 150 feet with the least restrictive device within 7 day(s).     7/25/2024 1340 by Michelle Calderon, PT  Outcome: Progressing     Problem: Occupational Therapy - Adult  Goal: By Discharge: Performs self-care activities at highest level of function for planned discharge setting.  See evaluation for individualized goals.  Description: FUNCTIONAL STATUS PRIOR TO ADMISSION:  Patient was ambulatory using a cane typically but more recently has been using a RW due to weakness and was independent for ADLs and IADLs PTA.    ADL Assistance: Independent, Homemaking Assistance: Independent, Ambulation Assistance: Independent, Transfer Assistance: Independent,       HOME SUPPORT: Patient lived with her sister but didn't require assistance.    Occupational Therapy Goals:  Initiated 7/23/2024  1.  Patient will perform grooming standing at sink with Modified [place] within 7 day(s).  2.  Patient will perform lower body dressing using AD PRN with Minimal Assist within 7 day(s).  3.  Patient will perform bathing with Supervision within 7  day(s).  4.  Patient will perform toilet transfers with Modified [place]  within 7 day(s).  5.  Patient will perform all aspects of toileting with Modified [place] within 7 day(s).  6.  Patient will participate in upper extremity therapeutic exercise/activities with [place] for 10 minutes within 7 day(s).    7.  Patient will utilize energy conservation techniques during functional activities with verbal cues within 7 day(s).  7/25/2024 1547 by Nori Hamilton, JULIO C  Outcome: Progressing     Problem: Safety - Adult  Goal: Free from fall injury  7/25/2024 2346 by Ibrahima Bullock, RN  Outcome: Progressing  7/25/2024 1031 by Kaylah Montesinos, RN  Outcome: Progressing  Flowsheets (Taken 7/25/2024 1030)  Free From Fall Injury: Instruct family/caregiver on patient safety     Problem: Discharge Planning  Goal: Discharge to home or other facility with appropriate resources  Outcome: Progressing  Flowsheets (Taken 7/25/2024 2000)  Discharge to home or other facility with appropriate resources: Identify barriers to discharge with patient and caregiver     Problem: ABCDS Injury Assessment  Goal: Absence of physical injury  Outcome: Progressing

## 2024-07-26 NOTE — PROGRESS NOTES
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 No new sx. Resting in bed  Denies any N/V/CP/SOB    Review of Systems: A comprehensive review of systems was negative.    Current Medications:   Current Facility-Administered Medications   Medication Dose Route Frequency    doxycycline hyclate (VIBRA-TABS) tablet 100 mg  100 mg Oral 2 times per day    acetaminophen (TYLENOL) tablet 650 mg  650 mg Oral Q6H    flecainide (TAMBOCOR) tablet 50 mg  50 mg Oral Q12H    losartan (COZAAR) tablet 50 mg  50 mg Oral Daily    pravastatin (PRAVACHOL) tablet 20 mg  20 mg Oral Nightly    rivaroxaban (XARELTO) tablet 20 mg  20 mg Oral Dinner    timolol (TIMOPTIC) 0.5 % ophthalmic solution 1 drop  1 drop Both Eyes BID    sodium chloride flush 0.9 % injection 5-40 mL  5-40 mL IntraVENous 2 times per day    sodium chloride flush 0.9 % injection 5-40 mL  5-40 mL IntraVENous PRN    0.9 % sodium chloride infusion   IntraVENous PRN    potassium chloride (KLOR-CON) extended release tablet 40 mEq  40 mEq Oral PRN    Or    potassium bicarb-citric acid (EFFER-K) effervescent tablet 40 mEq  40 mEq Oral PRN    Or    potassium chloride 10 mEq/100 mL IVPB (Peripheral Line)  10 mEq IntraVENous PRN    magnesium sulfate 2000 mg in 50 mL IVPB premix  2,000 mg IntraVENous PRN    ondansetron (ZOFRAN-ODT)  hours.    Invalid input(s): \"SGOT\", \"GPT\", \"AP\", \"TBIL\", \"ALB\", \"AML\", \"AMYP\", \"LPSE\", \"LAC\"    No results for input(s): \"INR\", \"APTT\" in the last 72 hours.    Invalid input(s): \"PTP\"   No results for input(s): \"CPK\", \"CKMB\", \"TROPONINI\" in the last 72 hours.    Invalid input(s): \"B-NP\"  Invalid input(s): \"PHI\", \"PCO2I\", \"PO2I\", \"FIO2I\"     Ventilator:       Microbiology:  No results found for: \"SDES\"  No components found for: \"CULT\"      I have reviewed the flowsheets.  Chart and Pertinent Notes have been reviewed.   No change in PMH ,family and social history from Consult note.      Oscar Killian MD  [facility]

## 2024-07-26 NOTE — DISCHARGE SUMMARY
Discharge Summary       PATIENT ID: Elizabeth Kay  MRN: [id number]   YOB: 1930    DATE OF ADMISSION: 7/23/2024 12:52 AM    DATE OF DISCHARGE: 7/26/2024   PRIMARY CARE PROVIDER: Valentina Hills MD     DISCHARGING PROVIDER: Dex Ortiz MD    To contact this individual call [phone] and ask the  to page.  If unavailable ask to be transferred the Adult Hospitalist Department.    CONSULTATIONS: IP CONSULT TO NEPHROLOGY  IP WOUND CARE NURSE CONSULT TO EVAL  IP CONSULT TO CASE MANAGEMENT    PROCEDURES/SURGERIES: * No surgery found *     ADMITTING DIAGNOSES & HOSPITAL COURSE:   ***        DISCHARGE DIAGNOSES / PLAN:       ***       PENDING TEST RESULTS:   At the time of discharge the following test results are still pending: ***    FOLLOW UP APPOINTMENTS:    Follow-up Information       Follow up With Specialties Details Why Contact [facility]  Follow up FOR HOME HEALTH SERVICES [phone]    Valentina Hills MD Family Medicine Schedule an appointment as soon as possible for a visit in 1 week(s) Repeat labs: CBC, BMP, Mg, Phos; [address]  [facility] [address] [phone]                ADDITIONAL CARE RECOMMENDATIONS: ***    DIET: {diet:98878}  Oral Nutritional Supplements: {NUTRITION SUPPLEMENTS:383642}{Frequencies; times a day:29309}    ACTIVITY: {discharge activity:69091}    WOUND CARE: ***    EQUIPMENT needed: ***      DISCHARGE MEDICATIONS:     Medication List        START taking these medications      doxycycline hyclate 100 MG tablet  Commonly known as: VIBRA-TABS  Take 1 tablet by mouth every 12 hours for 3 doses            CONTINUE taking these medications      acetaminophen 325 MG tablet  Commonly known as: TYLENOL     flecainide 50 MG tablet  Commonly known as: TAMBOCOR     furosemide 40 MG tablet  Commonly known as: LASIX     losartan 50 MG tablet  Commonly known as: COZAAR     potassium chloride 10 MEQ extended release

## 2024-09-29 ENCOUNTER — HOSPITAL ENCOUNTER (EMERGENCY)
Facility: HOSPITAL | Age: 89
Discharge: HOME | End: 2024-09-29
Payer: MEDICARE

## 2024-09-29 ENCOUNTER — APPOINTMENT (OUTPATIENT)
Facility: HOSPITAL | Age: 89
End: 2024-09-29
Payer: MEDICARE

## 2024-09-29 VITALS
BODY MASS INDEX: 32.25 KG/M2 | TEMPERATURE: 97.7 F | WEIGHT: 160 LBS | HEIGHT: 59 IN | RESPIRATION RATE: 18 BRPM | SYSTOLIC BLOOD PRESSURE: 178 MMHG | OXYGEN SATURATION: 96 % | HEART RATE: 83 BPM | DIASTOLIC BLOOD PRESSURE: 86 MMHG

## 2024-09-29 DIAGNOSIS — M79.605 LEFT LEG PAIN: Primary | ICD-10-CM

## 2024-09-29 DIAGNOSIS — S80.12XA CONTUSION OF LEFT LOWER EXTREMITY, INITIAL ENCOUNTER: ICD-10-CM

## 2024-09-29 DIAGNOSIS — T14.8XXA BLOOD BLISTER: ICD-10-CM

## 2024-09-29 PROCEDURE — 99284 EMERGENCY DEPT VISIT MOD MDM: CPT

## 2024-09-29 PROCEDURE — 93971 EXTREMITY STUDY: CPT

## 2024-09-29 ASSESSMENT — PAIN SCALES - GENERAL: PAINLEVEL_OUTOF10: 5

## 2024-09-29 ASSESSMENT — PAIN DESCRIPTION - PAIN TYPE: TYPE: ACUTE PAIN

## 2024-09-29 ASSESSMENT — PAIN - FUNCTIONAL ASSESSMENT: PAIN_FUNCTIONAL_ASSESSMENT: 0-10

## 2024-09-29 ASSESSMENT — PAIN DESCRIPTION - DESCRIPTORS: DESCRIPTORS: ACHING

## 2024-09-29 ASSESSMENT — PAIN DESCRIPTION - FREQUENCY: FREQUENCY: CONTINUOUS

## 2024-09-29 ASSESSMENT — PAIN DESCRIPTION - LOCATION: LOCATION: LEG

## 2024-09-29 ASSESSMENT — PAIN DESCRIPTION - ORIENTATION: ORIENTATION: LEFT

## 2024-09-29 NOTE — ED NOTES
Discharge instructions were given to the patient by Sara Lugo RN.  The patient left the Emergency Department alert and oriented and in no acute distress with 0 prescription(s). The patient was encouraged to call or return to the ED for worsening issues or problems and was encouraged to schedule a follow up appointment for continuing care.  The patient verbalized understanding of discharge instructions and prescriptions; all questions were answered. The patient has no further concerns at this time.

## 2024-09-29 NOTE — ED NOTES
Pt presents to ED complaining of leg pain x10 days. Pt reports that she was sitting in chair when her 15mo old grandson knocked into the outside of her left leg. Pt reports pain with ambulation. Pt has hematoma and bruising at site. Pt reports hx of Afib and HTN. Pt reports taking blood thinners. Pt is alert and oriented x 4, RR even and unlabored, skin is warm and dry. Pt appears in NAD at this time. Assessment completed and pt updated on plan of care.  Call bell in reach.   Emergency Department Nursing Plan of Care  The Nursing Plan of Care is developed from the Nursing assessment and Emergency Department Attending provider initial evaluation.  The plan of care may be reviewed in the “ED Provider note”.  The Plan of Care was developed with the following considerations:  Patient / Family readiness to learn indicated by:Refer to Medical chart in [facility]  Persons(s) to be included in education: Refer to Medical chart in [facility]  Barriers to Learning/Limitations:Normal

## 2024-09-30 LAB — ECHO BSA: 1.74 M2

## 2024-09-30 PROCEDURE — 93971 EXTREMITY STUDY: CPT | Performed by: INTERNAL MEDICINE

## 2024-10-01 ASSESSMENT — ENCOUNTER SYMPTOMS
BACK PAIN: 0
ABDOMINAL PAIN: 0
SHORTNESS OF BREATH: 0

## 2024-10-01 NOTE — ED PROVIDER NOTES
[facility] EMERGENCY DEPT  EMERGENCY DEPARTMENT ENCOUNTER       Pt Name: Elizabeth Kay  MRN: [id number]  Birthdate 3/21/1930  Date of evaluation: 9/29/2024  Provider: AL Julien - NP   PCP: Valentina Hills MD  Note Started: 6:51 PM 10/1/24     CHIEF COMPLAINT       Chief Complaint   Patient presents with    Leg Pain     Complainsof left leg pain and bruise after her grandchild \"knocked\" into her leg 10 days ago         HISTORY OF PRESENT ILLNESS: 1 or more elements      History Provided by: Patient   History is limited by: Nothing     Elizabeth Kay is a 89 y.o. female who presents cc left leg pain onset 10 days ago. States he grandchild bumped into her leg.[facility] . States xray negative but leg is still swollen. Reports blister on leg.     Nursing Notes were all reviewed and agreed with or any disagreements were addressed in the HPI.     REVIEW OF SYSTEMS      Review of Systems   Constitutional:  Negative for fever.   HENT:  Negative for congestion.    Eyes:  Negative for visual disturbance.   Respiratory:  Negative for shortness of breath.    Cardiovascular:  Negative for chest pain.   Gastrointestinal:  Negative for abdominal pain.   Genitourinary:  Negative for difficulty urinating.   Musculoskeletal:  Negative for back pain and neck pain.        Leg pain   Skin:  Negative for rash.   Neurological:  Negative for dizziness, weakness and headaches.   All other systems reviewed and are negative.       Positives and Pertinent negatives as per HPI.    PAST HISTORY     Past Medical History:  Past Medical History:   Diagnosis Date    A-fib (HCC)     Arthritis     Cancer (HCC)     STOMACH    Hypertension     PUD (peptic ulcer disease)        Past Surgical History:  Past Surgical History:   Procedure Laterality Date    HEENT Right 2013    RESTORED VISION    HYSTERECTOMY (CERVIX STATUS UNKNOWN)  1971    TN UNLISTED PROCEDURE ABDOMEN PERITONEUM & OMENTUM  2016

## 2024-10-08 ENCOUNTER — FOLLOWUP TELEPHONE ENCOUNTER (OUTPATIENT)
Facility: HOSPITAL | Age: 89
End: 2024-10-08

## 2024-10-08 NOTE — TELEPHONE ENCOUNTER
Pt's daughter called ER today (Ximena: [phone]) asking for help w/ scheduling follow up for her mom. She stated that pt was only given note to \"follow up with wound care\" in her AVS. CM returned daughter's call today. She did not answer; left vm w/ work cell and let her know she was available to help as needed.     Viry Stockton, LMSW, CM

## 2024-10-10 ENCOUNTER — APPOINTMENT (OUTPATIENT)
Facility: HOSPITAL | Age: 89
DRG: 378 | End: 2024-10-10
Payer: MEDICARE

## 2024-10-10 ENCOUNTER — HOSPITAL ENCOUNTER (INPATIENT)
Facility: HOSPITAL | Age: 89
LOS: 7 days | Discharge: NURSING, REHAB OR LONG-TERM CARE | DRG: 378 | End: 2024-10-18
Attending: STUDENT IN AN ORGANIZED HEALTH CARE EDUCATION/TRAINING PROGRAM | Admitting: STUDENT IN AN ORGANIZED HEALTH CARE EDUCATION/TRAINING PROGRAM
Payer: MEDICARE

## 2024-10-10 DIAGNOSIS — K92.2 LOWER GI BLEED: Primary | ICD-10-CM

## 2024-10-10 DIAGNOSIS — Z79.01 ON RIVAROXABAN THERAPY: ICD-10-CM

## 2024-10-10 LAB
ALBUMIN SERPL-MCNC: 2.5 G/DL (ref 3.5–5)
ALBUMIN/GLOB SERPL: 0.5 (ref 1.1–2.2)
ALP SERPL-CCNC: 84 U/L (ref 45–117)
ALT SERPL-CCNC: 12 U/L (ref 12–78)
ANION GAP SERPL CALC-SCNC: 2 MMOL/L (ref 2–12)
APTT PPP: 43.6 SEC (ref 22.1–31)
AST SERPL-CCNC: ABNORMAL U/L (ref 15–37)
BASOPHILS # BLD: 0 K/UL (ref 0–0.1)
BASOPHILS NFR BLD: 0 % (ref 0–1)
BILIRUB SERPL-MCNC: 0.9 MG/DL (ref 0.2–1)
BUN SERPL-MCNC: 13 MG/DL (ref 6–20)
BUN/CREAT SERPL: 14 (ref 12–20)
CALCIUM SERPL-MCNC: 9 MG/DL (ref 8.5–10.1)
CHLORIDE SERPL-SCNC: 96 MMOL/L (ref 97–108)
CO2 SERPL-SCNC: 35 MMOL/L (ref 21–32)
CREAT SERPL-MCNC: 0.9 MG/DL (ref 0.55–1.02)
DIFFERENTIAL METHOD BLD: ABNORMAL
EOSINOPHIL # BLD: 0 K/UL (ref 0–0.4)
EOSINOPHIL NFR BLD: 0 % (ref 0–7)
ERYTHROCYTE [DISTWIDTH] IN BLOOD BY AUTOMATED COUNT: 16.6 % (ref 11.5–14.5)
GLOBULIN SER CALC-MCNC: 4.9 G/DL (ref 2–4)
GLUCOSE SERPL-MCNC: 111 MG/DL (ref 65–100)
HCT VFR BLD AUTO: 39.6 % (ref 35–47)
HGB BLD-MCNC: 12.2 G/DL (ref 11.5–16)
IMM GRANULOCYTES # BLD AUTO: 0.1 K/UL (ref 0–0.04)
IMM GRANULOCYTES NFR BLD AUTO: 1 % (ref 0–0.5)
INR PPP: 1.3 (ref 0.9–1.1)
LYMPHOCYTES # BLD: 0.8 K/UL (ref 0.8–3.5)
LYMPHOCYTES NFR BLD: 12 % (ref 12–49)
MCH RBC QN AUTO: 26.6 PG (ref 26–34)
MCHC RBC AUTO-ENTMCNC: 30.8 G/DL (ref 30–36.5)
MCV RBC AUTO: 86.3 FL (ref 80–99)
MONOCYTES # BLD: 0.6 K/UL (ref 0–1)
MONOCYTES NFR BLD: 10 % (ref 5–13)
NEUTS SEG # BLD: 4.8 K/UL (ref 1.8–8)
NEUTS SEG NFR BLD: 77 % (ref 32–75)
NRBC # BLD: 0 K/UL (ref 0–0.01)
NRBC BLD-RTO: 0 PER 100 WBC
PLATELET # BLD AUTO: 385 K/UL (ref 150–400)
PMV BLD AUTO: 10.2 FL (ref 8.9–12.9)
POTASSIUM SERPL-SCNC: ABNORMAL MMOL/L (ref 3.5–5.1)
PROT SERPL-MCNC: 7.4 G/DL (ref 6.4–8.2)
PROTHROMBIN TIME: 13.7 SEC (ref 9–11.1)
RBC # BLD AUTO: 4.59 M/UL (ref 3.8–5.2)
RBC MORPH BLD: ABNORMAL
SODIUM SERPL-SCNC: 133 MMOL/L (ref 136–145)
THERAPEUTIC RANGE: ABNORMAL SECS (ref 58–77)
WBC # BLD AUTO: 6.3 K/UL (ref 3.6–11)

## 2024-10-10 PROCEDURE — 6360000004 HC RX CONTRAST MEDICATION: Performed by: STUDENT IN AN ORGANIZED HEALTH CARE EDUCATION/TRAINING PROGRAM

## 2024-10-10 PROCEDURE — 86850 RBC ANTIBODY SCREEN: CPT

## 2024-10-10 PROCEDURE — 85730 THROMBOPLASTIN TIME PARTIAL: CPT

## 2024-10-10 PROCEDURE — 85025 COMPLETE CBC W/AUTO DIFF WBC: CPT

## 2024-10-10 PROCEDURE — 36415 COLL VENOUS BLD VENIPUNCTURE: CPT

## 2024-10-10 PROCEDURE — 85610 PROTHROMBIN TIME: CPT

## 2024-10-10 PROCEDURE — 80053 COMPREHEN METABOLIC PANEL: CPT

## 2024-10-10 PROCEDURE — 74178 CT ABD&PLV WO CNTR FLWD CNTR: CPT

## 2024-10-10 PROCEDURE — 86901 BLOOD TYPING SEROLOGIC RH(D): CPT

## 2024-10-10 PROCEDURE — 86900 BLOOD TYPING SEROLOGIC ABO: CPT

## 2024-10-10 PROCEDURE — 99285 EMERGENCY DEPT VISIT HI MDM: CPT

## 2024-10-10 RX ORDER — IOPAMIDOL 755 MG/ML
100 INJECTION, SOLUTION INTRAVASCULAR
Status: COMPLETED | OUTPATIENT
Start: 2024-10-10 | End: 2024-10-10

## 2024-10-10 RX ADMIN — IOPAMIDOL 100 ML: 755 INJECTION, SOLUTION INTRAVENOUS at 23:34

## 2024-10-10 ASSESSMENT — PAIN - FUNCTIONAL ASSESSMENT: PAIN_FUNCTIONAL_ASSESSMENT: NONE - DENIES PAIN

## 2024-10-10 NOTE — ED TRIAGE NOTES
Patient reports rectal bleeding that started today.    + Xalerto. Pulse-ox applied to ear to get accurate reading.

## 2024-10-10 NOTE — ED NOTES
6:52 PM    89 yo F with rectal bleeding with onset today. Denies abdominal pain, dizziness, shortness of breath. On xarelto, hx of afib.     I have evaluated the patient as the Provider in Rapid Medical Evaluation (RME). I have reviewed her vital signs and the triage nurse assessment. I have talked with the patient and any available family and advised that I am the provider in triage and have ordered the appropriate study to initiate their work up based on the clinical presentation during my assessment. I have advised that the patient will be accommodated in the Main ED as soon as possible. I have also requested to contact the triage nurse or myself immediately if the patient experiences any changes in their condition during this brief waiting period.  AMIE Singh Emily A, PA  10/10/24 7973

## 2024-10-11 PROBLEM — K92.2 GI BLEED: Status: ACTIVE | Noted: 2024-10-11

## 2024-10-11 LAB
ABO + RH BLD: NORMAL
ANION GAP SERPL CALC-SCNC: 3 MMOL/L (ref 2–12)
ANION GAP SERPL CALC-SCNC: 5 MMOL/L (ref 2–12)
BASOPHILS # BLD: 0 K/UL (ref 0–0.1)
BASOPHILS # BLD: 0.1 K/UL (ref 0–0.1)
BASOPHILS NFR BLD: 0 % (ref 0–1)
BASOPHILS NFR BLD: 1 % (ref 0–1)
BLOOD GROUP ANTIBODIES SERPL: NORMAL
BUN SERPL-MCNC: 9 MG/DL (ref 6–20)
BUN SERPL-MCNC: 9 MG/DL (ref 6–20)
BUN/CREAT SERPL: 13 (ref 12–20)
BUN/CREAT SERPL: 15 (ref 12–20)
CALCIUM SERPL-MCNC: 8.3 MG/DL (ref 8.5–10.1)
CALCIUM SERPL-MCNC: 8.6 MG/DL (ref 8.5–10.1)
CEA SERPL-MCNC: 1.9 NG/ML
CHLORIDE SERPL-SCNC: 101 MMOL/L (ref 97–108)
CHLORIDE SERPL-SCNC: 105 MMOL/L (ref 97–108)
CO2 SERPL-SCNC: 33 MMOL/L (ref 21–32)
CO2 SERPL-SCNC: 33 MMOL/L (ref 21–32)
CREAT SERPL-MCNC: 0.6 MG/DL (ref 0.55–1.02)
CREAT SERPL-MCNC: 0.68 MG/DL (ref 0.55–1.02)
DIFFERENTIAL METHOD BLD: ABNORMAL
DIFFERENTIAL METHOD BLD: ABNORMAL
EOSINOPHIL # BLD: 0 K/UL (ref 0–0.4)
EOSINOPHIL # BLD: 0 K/UL (ref 0–0.4)
EOSINOPHIL NFR BLD: 0 % (ref 0–7)
EOSINOPHIL NFR BLD: 0 % (ref 0–7)
ERYTHROCYTE [DISTWIDTH] IN BLOOD BY AUTOMATED COUNT: 16.7 % (ref 11.5–14.5)
ERYTHROCYTE [DISTWIDTH] IN BLOOD BY AUTOMATED COUNT: 16.7 % (ref 11.5–14.5)
GLUCOSE SERPL-MCNC: 103 MG/DL (ref 65–100)
GLUCOSE SERPL-MCNC: 91 MG/DL (ref 65–100)
HCT VFR BLD AUTO: 28.9 % (ref 35–47)
HCT VFR BLD AUTO: 29.6 % (ref 35–47)
HGB BLD-MCNC: 9.1 G/DL (ref 11.5–16)
HGB BLD-MCNC: 9.4 G/DL (ref 11.5–16)
HISTORY CHECK: NORMAL
IMM GRANULOCYTES # BLD AUTO: 0.1 K/UL (ref 0–0.04)
IMM GRANULOCYTES # BLD AUTO: 0.1 K/UL (ref 0–0.04)
IMM GRANULOCYTES NFR BLD AUTO: 1 % (ref 0–0.5)
IMM GRANULOCYTES NFR BLD AUTO: 1 % (ref 0–0.5)
LYMPHOCYTES # BLD: 0.6 K/UL (ref 0.8–3.5)
LYMPHOCYTES # BLD: 0.6 K/UL (ref 0.8–3.5)
LYMPHOCYTES NFR BLD: 10 % (ref 12–49)
LYMPHOCYTES NFR BLD: 11 % (ref 12–49)
MCH RBC QN AUTO: 26.5 PG (ref 26–34)
MCH RBC QN AUTO: 26.7 PG (ref 26–34)
MCHC RBC AUTO-ENTMCNC: 31.5 G/DL (ref 30–36.5)
MCHC RBC AUTO-ENTMCNC: 31.8 G/DL (ref 30–36.5)
MCV RBC AUTO: 84.1 FL (ref 80–99)
MCV RBC AUTO: 84.3 FL (ref 80–99)
MONOCYTES # BLD: 0.6 K/UL (ref 0–1)
MONOCYTES # BLD: 0.8 K/UL (ref 0–1)
MONOCYTES NFR BLD: 11 % (ref 5–13)
MONOCYTES NFR BLD: 12 % (ref 5–13)
NEUTS SEG # BLD: 4.5 K/UL (ref 1.8–8)
NEUTS SEG # BLD: 4.8 K/UL (ref 1.8–8)
NEUTS SEG NFR BLD: 76 % (ref 32–75)
NEUTS SEG NFR BLD: 77 % (ref 32–75)
NRBC # BLD: 0 K/UL (ref 0–0.01)
NRBC # BLD: 0 K/UL (ref 0–0.01)
NRBC BLD-RTO: 0 PER 100 WBC
NRBC BLD-RTO: 0 PER 100 WBC
PLATELET # BLD AUTO: 324 K/UL (ref 150–400)
PLATELET # BLD AUTO: 332 K/UL (ref 150–400)
PMV BLD AUTO: 10.1 FL (ref 8.9–12.9)
PMV BLD AUTO: 9.5 FL (ref 8.9–12.9)
POTASSIUM SERPL-SCNC: 2.5 MMOL/L (ref 3.5–5.1)
POTASSIUM SERPL-SCNC: 3.5 MMOL/L (ref 3.5–5.1)
RBC # BLD AUTO: 3.43 M/UL (ref 3.8–5.2)
RBC # BLD AUTO: 3.52 M/UL (ref 3.8–5.2)
RBC MORPH BLD: ABNORMAL
SODIUM SERPL-SCNC: 139 MMOL/L (ref 136–145)
SODIUM SERPL-SCNC: 141 MMOL/L (ref 136–145)
SPECIMEN EXP DATE BLD: NORMAL
WBC # BLD AUTO: 5.9 K/UL (ref 3.6–11)
WBC # BLD AUTO: 6.3 K/UL (ref 3.6–11)

## 2024-10-11 PROCEDURE — 36415 COLL VENOUS BLD VENIPUNCTURE: CPT

## 2024-10-11 PROCEDURE — 2580000003 HC RX 258: Performed by: STUDENT IN AN ORGANIZED HEALTH CARE EDUCATION/TRAINING PROGRAM

## 2024-10-11 PROCEDURE — 82378 CARCINOEMBRYONIC ANTIGEN: CPT

## 2024-10-11 PROCEDURE — 80048 BASIC METABOLIC PNL TOTAL CA: CPT

## 2024-10-11 PROCEDURE — 2060000000 HC ICU INTERMEDIATE R&B

## 2024-10-11 PROCEDURE — 6370000000 HC RX 637 (ALT 250 FOR IP): Performed by: STUDENT IN AN ORGANIZED HEALTH CARE EDUCATION/TRAINING PROGRAM

## 2024-10-11 PROCEDURE — 85025 COMPLETE CBC W/AUTO DIFF WBC: CPT

## 2024-10-11 PROCEDURE — 2580000003 HC RX 258: Performed by: NURSE PRACTITIONER

## 2024-10-11 PROCEDURE — 99223 1ST HOSP IP/OBS HIGH 75: CPT

## 2024-10-11 PROCEDURE — 6360000002 HC RX W HCPCS: Performed by: NURSE PRACTITIONER

## 2024-10-11 RX ORDER — SODIUM CHLORIDE 0.9 % (FLUSH) 0.9 %
5-40 SYRINGE (ML) INJECTION EVERY 12 HOURS SCHEDULED
Status: DISCONTINUED | OUTPATIENT
Start: 2024-10-11 | End: 2024-10-18 | Stop reason: HOSPADM

## 2024-10-11 RX ORDER — ONDANSETRON 4 MG/1
4 TABLET, ORALLY DISINTEGRATING ORAL EVERY 8 HOURS PRN
Status: DISCONTINUED | OUTPATIENT
Start: 2024-10-11 | End: 2024-10-18 | Stop reason: HOSPADM

## 2024-10-11 RX ORDER — SODIUM CHLORIDE 9 MG/ML
INJECTION, SOLUTION INTRAVENOUS PRN
Status: DISCONTINUED | OUTPATIENT
Start: 2024-10-11 | End: 2024-10-18 | Stop reason: HOSPADM

## 2024-10-11 RX ORDER — SODIUM CHLORIDE 0.9 % (FLUSH) 0.9 %
5-40 SYRINGE (ML) INJECTION PRN
Status: DISCONTINUED | OUTPATIENT
Start: 2024-10-11 | End: 2024-10-18 | Stop reason: HOSPADM

## 2024-10-11 RX ORDER — MAGNESIUM SULFATE IN WATER 40 MG/ML
2000 INJECTION, SOLUTION INTRAVENOUS PRN
Status: DISCONTINUED | OUTPATIENT
Start: 2024-10-11 | End: 2024-10-18 | Stop reason: HOSPADM

## 2024-10-11 RX ORDER — FLECAINIDE ACETATE 100 MG/1
50 TABLET ORAL EVERY 12 HOURS
Status: DISCONTINUED | OUTPATIENT
Start: 2024-10-11 | End: 2024-10-18 | Stop reason: HOSPADM

## 2024-10-11 RX ORDER — TIMOLOL MALEATE 5 MG/ML
1 SOLUTION/ DROPS OPHTHALMIC 2 TIMES DAILY
Status: DISCONTINUED | OUTPATIENT
Start: 2024-10-11 | End: 2024-10-11

## 2024-10-11 RX ORDER — TIMOLOL MALEATE 5 MG/ML
1 SOLUTION/ DROPS OPHTHALMIC 2 TIMES DAILY
Status: DISCONTINUED | OUTPATIENT
Start: 2024-10-11 | End: 2024-10-18 | Stop reason: HOSPADM

## 2024-10-11 RX ORDER — POLYETHYLENE GLYCOL 3350 17 G/17G
17 POWDER, FOR SOLUTION ORAL DAILY PRN
Status: DISCONTINUED | OUTPATIENT
Start: 2024-10-11 | End: 2024-10-18 | Stop reason: HOSPADM

## 2024-10-11 RX ORDER — PRAVASTATIN SODIUM 10 MG
40 TABLET ORAL NIGHTLY
Status: DISCONTINUED | OUTPATIENT
Start: 2024-10-11 | End: 2024-10-18 | Stop reason: HOSPADM

## 2024-10-11 RX ORDER — ACETAMINOPHEN 325 MG/1
650 TABLET ORAL EVERY 6 HOURS PRN
Status: DISCONTINUED | OUTPATIENT
Start: 2024-10-11 | End: 2024-10-18 | Stop reason: HOSPADM

## 2024-10-11 RX ORDER — ONDANSETRON 2 MG/ML
4 INJECTION INTRAMUSCULAR; INTRAVENOUS EVERY 6 HOURS PRN
Status: DISCONTINUED | OUTPATIENT
Start: 2024-10-11 | End: 2024-10-18 | Stop reason: HOSPADM

## 2024-10-11 RX ORDER — POTASSIUM CHLORIDE 750 MG/1
40 TABLET, EXTENDED RELEASE ORAL PRN
Status: DISCONTINUED | OUTPATIENT
Start: 2024-10-11 | End: 2024-10-18 | Stop reason: HOSPADM

## 2024-10-11 RX ORDER — POTASSIUM CHLORIDE 7.45 MG/ML
10 INJECTION INTRAVENOUS PRN
Status: DISCONTINUED | OUTPATIENT
Start: 2024-10-11 | End: 2024-10-18 | Stop reason: HOSPADM

## 2024-10-11 RX ORDER — SODIUM CHLORIDE 9 MG/ML
INJECTION, SOLUTION INTRAVENOUS CONTINUOUS
Status: DISCONTINUED | OUTPATIENT
Start: 2024-10-11 | End: 2024-10-12

## 2024-10-11 RX ORDER — ACETAMINOPHEN 650 MG/1
650 SUPPOSITORY RECTAL EVERY 6 HOURS PRN
Status: DISCONTINUED | OUTPATIENT
Start: 2024-10-11 | End: 2024-10-18 | Stop reason: HOSPADM

## 2024-10-11 RX ADMIN — POTASSIUM CHLORIDE 40 MEQ: 750 TABLET, EXTENDED RELEASE ORAL at 13:17

## 2024-10-11 RX ADMIN — SODIUM CHLORIDE: 9 INJECTION, SOLUTION INTRAVENOUS at 04:51

## 2024-10-11 RX ADMIN — SODIUM CHLORIDE: 9 INJECTION, SOLUTION INTRAVENOUS at 16:55

## 2024-10-11 RX ADMIN — TIMOLOL MALEATE 1 DROP: 5 SOLUTION OPHTHALMIC at 09:02

## 2024-10-11 RX ADMIN — FLECAINIDE ACETATE 50 MG: 100 TABLET ORAL at 05:22

## 2024-10-11 RX ADMIN — PANTOPRAZOLE SODIUM 40 MG: 40 INJECTION, POWDER, FOR SOLUTION INTRAVENOUS at 23:52

## 2024-10-11 RX ADMIN — POTASSIUM CHLORIDE 40 MEQ: 750 TABLET, EXTENDED RELEASE ORAL at 16:54

## 2024-10-11 RX ADMIN — PANTOPRAZOLE SODIUM 40 MG: 40 INJECTION, POWDER, FOR SOLUTION INTRAVENOUS at 12:50

## 2024-10-11 RX ADMIN — FLECAINIDE ACETATE 50 MG: 100 TABLET ORAL at 15:42

## 2024-10-11 RX ADMIN — ACETAMINOPHEN 650 MG: 325 TABLET ORAL at 05:26

## 2024-10-11 RX ADMIN — TIMOLOL MALEATE 1 DROP: 5 SOLUTION OPHTHALMIC at 21:01

## 2024-10-11 ASSESSMENT — PAIN DESCRIPTION - DESCRIPTORS: DESCRIPTORS: TENDER

## 2024-10-11 ASSESSMENT — PAIN DESCRIPTION - LOCATION: LOCATION: ABDOMEN

## 2024-10-11 ASSESSMENT — PAIN SCALES - GENERAL: PAINLEVEL_OUTOF10: 4

## 2024-10-11 ASSESSMENT — PAIN DESCRIPTION - ORIENTATION: ORIENTATION: LOWER

## 2024-10-11 NOTE — CARE COORDINATION
Care Management Initial Assessment       RUR: 15%  Readmission? No  1st IM letter given? Yes - 10/10/24  1st  letter given: No      CM met with pt. She is  lying in bed alert and oriented x4. Demographics and insurance confirmed. Pt lives with family in  multi story home. 3 steps to enter home but has a wheelchair ramp. Has stair lift inside.  She is dependent with ADL's. She uses a cane and rolling walker for mobility. Pt has  services for the past 4 weeks with [facility] . Plan is to return home with family support. No needs identified at this time. CM following.     Principal Problem:    GI bleed     10/11/24 1334   Service Assessment   Patient Orientation Alert and Oriented   Cognition Alert   History Provided By Patient   Primary Caregiver Self   Support Systems Family Members   Patient's Healthcare Decision Maker is: Legal Next of Kin   PCP Verified by CM Yes   Last Visit to PCP Within last 6 months   Prior Functional Level Assistance with the following:;Housework;Shopping;Mobility;Cooking;Dressing;Bathing   Current Functional Level Assistance with the following:;Mobility;Shopping;Housework;Cooking;Bathing;Dressing   Can patient return to prior living arrangement Yes   Ability to make needs known: Good   Family able to assist with home care needs: Yes   Would you like for me to discuss the discharge plan with any other family members/significant others, and if so, who? No   Financial Resources Medicare  ([employer] GOLD PLUS HMO)   Social/Functional History   Lives With Family   Type of Home House   Home Layout Two level   Home Access Ramped entrance   Home Equipment Cane;Walker - Rolling   Receives Help From Family   ADL Assistance Needs assistance   Toileting Needs assistance   Homemaking Assistance Needs assistance   Ambulation Assistance Needs assistance   Transfer Assistance Needs assistance   Occupation Retired   Discharge Planning   Living Arrangements Family Members   Type of Home Care Services

## 2024-10-11 NOTE — PROGRESS NOTES
Hospitalist Progress Note  Kaitlin Brito MD  Answering service: [phone] OR [phone] from in house phone        Date of Service:  10/11/2024  NAME:  Elizabeth Kay  :  3/21/1930  MRN:  [id number]      Admission Summary:   Elizabeth Kay is a 89 y.o. female with history of A-fib on Xarelto, CHF, hypertension, peptic ulcer disease, history of gastric cancer status post partial gastric resection who presented to ED with complaints of rectal bleeding.  States she had been in usual state of health until earlier this evening when she noted painless rectal bleeding both with and without bowel movement.  Describes bright red blood per rectum.  Denies any previous history of GI bleeds       Interval history / Subjective:   Patient seen and examined discussed with patient's daughter over the phone and with patient at bedside discussed with rn pt was on xarelto for a fib last 4 years     Assessment & Plan:     GI / Rectal Bleed in the setting of taking Xarelto  -Active bleeding distal descending colon  -Likely secondary to above  -Type and cross and hold 1 unit PRBC  -H&H every 6 transfuse below 7  -Continue empiric Zosyn check Pro-Senthil  -Seen by GI no active acute intervention planned clear liquids     Left renal mass  Left adrenal mass  Retroperitoneal adenopathy  Moderate left pleural effusion  -Findings are concerning for metastatic cancer  -Left renal mass extending to the diaphragm may be causing pleural effusion  -Palliative consult to discuss extent of diagnostic studies and treatment in this 89-year-old patient     Atrial fibrillation  -Continue PTA flecainide  -Hold Xarelto     Hypertension  -Hold Lasix and losartan given GI bleed  -Monitor and treat with as needed medications     Dyslipidemia  -PTA pravastatin     Glaucoma  -Ophthalmic timolol twice daily     Code status: Full  Prophylaxis: SCD  Care Plan  72 hours.    Invalid input(s): \"FE\", \"PSAT\", \"FERR\"   No results found for: \"RBCF\"   No results for input(s): \"PH\", \"PCO2\", \"PO2\" in the last 72 hours.  No results for input(s): \"CPK\" in the last 72 hours.    Invalid input(s): \"CPKMB\", \"CKNDX\", \"TROIQ\"  No results found for: \"CHOL\", \"CHLST\", \"CHOLV\", \"HDL\", \"HDLC\", \"LDL\", \"LDLC\"  No results found for: \"GLUCPOC\"        Medications Reviewed:     Current Facility-Administered Medications   Medication Dose Route Frequency    flecainide (TAMBOCOR) tablet 50 mg  50 mg Oral Q12H    pravastatin (PRAVACHOL) tablet 40 mg  40 mg Oral Nightly    timolol (TIMOPTIC) 0.5 % ophthalmic solution 1 drop  1 drop Both Eyes BID    0.9 % sodium chloride infusion   IntraVENous PRN    sodium chloride flush 0.9 % injection 5-40 mL  5-40 mL IntraVENous 2 times per day    sodium chloride flush 0.9 % injection 5-40 mL  5-40 mL IntraVENous PRN    0.9 % sodium chloride infusion   IntraVENous PRN    potassium chloride (KLOR-CON) extended release tablet 40 mEq  40 mEq Oral PRN    Or    potassium bicarb-citric acid (EFFER-K) effervescent tablet 40 mEq  40 mEq Oral PRN    Or    potassium chloride 10 mEq/100 mL IVPB (Peripheral Line)  10 mEq IntraVENous PRN    magnesium sulfate 2000 mg in 50 mL IVPB premix  2,000 mg IntraVENous PRN    ondansetron (ZOFRAN-ODT) disintegrating tablet 4 mg  4 mg Oral Q8H PRN    Or    ondansetron (ZOFRAN) injection 4 mg  4 mg IntraVENous Q6H PRN    polyethylene glycol (GLYCOLAX) packet 17 g  17 g Oral Daily PRN    acetaminophen (TYLENOL) tablet 650 mg  650 mg Oral Q6H PRN    Or    acetaminophen (TYLENOL) suppository 650 mg  650 mg Rectal Q6H PRN    0.9 % sodium chloride infusion   IntraVENous Continuous     ______________________________________________________________________  EXPECTED LENGTH OF STAY: Unable to retrieve estimated LOS  ACTUAL LENGTH OF STAY:          0                 Kaitlin Brito MD

## 2024-10-11 NOTE — CONSULTS
note routed to primary continuity provider and/or primary health care team members  Hospitalist and  updated.  Hospice referral placed. Sign off  Please call with any palliative questions or concerns.  Palliative Care Team is available via perfect serve or via phone.    Referrals to:   [] Outpatient Palliative Care  [] Home Based Palliative Care  [] Home Based Primary Care  [x] Hospice       ADVANCE CARE PLANNING:   [x] The [facility] Interdisciplinary Team has updated the ACP Navigator with Health Care Decision Maker and Patient Capacity      Confirm Advance Directive: None    Current Code Status: Full Code     Goals of Care: Goals of Care and Interventions  Patient/Health Care Proxy Stated Goals: Recovery from acute illness  Medical Interventions: Full interventions  Artificially Administered Nutrition: No feeding tube  Life Goals  Patient and Family Personal Goals: Get something to eat and get some rest    Please refer to Palliative Medicine ACP notes for further details.    PALLIATIVE ASSESSMENT:      Palliative Performance Scale (PPS):  PPS: 60    ECOG:   ECOG Status : Limited self-care [3]    Modified ESAS:  Modified-[place] Symptom Assessment Scale (ESAS)  Tiredness Score: 2  Drowsiness Score: 2  Depression Score: Not depressed  Pain Score: No pain  Anxiety Score: Not anxious  Nausea Score: Not nauseated  Appetite Score: Best appetite  Dyspnea Score: No shortness of breath  Wellbeing Score: Best feeling of wellbeing  Other Problem Score: Best possible response    Clinical Pain Assessment (nonverbal scale for severity on nonverbal patients):   Clinical Pain Assessment  Severity: 0       NVPS:  Adult Nonverbal Pain Scale (NVPS)  Physiology (Vital Signs): Stable vital signs  Respiratory: Baseline RR/SpO2 compliant with ventilator    RDOS:  RDOS  Heart rate per minute: less than 90  Respiratory Rate per Minute: less than 19  Restlessness:non-purposeful: None  Paradoxical breathing pattern:abdomen moves  in on inspiration: None  Accessory muscle use: rise in clavicle during inspiration: None  Grunting at end-expiration: guttural sound: None  Nasal flaring: involuntary movement of nares: None  Look of fear: None  Total : 0      Vital Signs: Blood pressure 121/61, pulse 72, temperature 98.2 °F (36.8 °C), temperature source Oral, resp. rate 23, height 1.499 m (4' 11\"), weight 69.9 kg (154 lb 1.6 oz), SpO2 100%.    PHYSICAL ASSESSMENT:   General: [x] Oriented x3  [] Well appearing  [] Intubated  []Ill appearing  []Other:  Mental Status: [x] Normal mental status exam  [] Drowsy  [] Confused  []Other:  Cardiovascular: [x] Regular rate/rhythm  [] Arrhythmia  [] Other:  Chest: [x] Effort normal  []Lungs clear  [] Respiratory distress  []Tachypnea  [] Other:  Abdomen: [] Soft/non-tender  [x] Normal appearance  [] Distended  [] Ascites  [] Other:  Neurological: [x] Normal speech  [] Normal sensation  []Deficits present:  Extremity: [] Normal skin color/temp  [] Clubbing/cyanosis  [] No edema  [x] Other: Left leg wound and edema    Wt Readings from Last 15 Encounters:   10/11/24 69.9 kg (154 lb 1.6 oz)   09/29/24 72.6 kg (160 lb)   07/23/24 79.6 kg (175 lb 7.8 oz)   07/28/23 69.4 kg (153 lb)   10/07/22 69.4 kg (153 lb)   08/22/22 69.4 kg (153 lb)   06/10/22 70.8 kg (156 lb)        Current Diet: ADULT DIET; Clear Liquid       PSYCHOSOCIAL/SPIRITUAL SCREENING:   Palliative IDT has assessed this patient for cultural preferences / practices and a referral made as appropriate to needs (Cultural Services, Patient Advocacy, Ethics, etc.)    Spiritual Affiliation: Judaism    Any spiritual / Moravian concerns:  [] Yes /  [x] No   If \"Yes\" to discuss with pastoral care during IDT     Does caregiver feel burdened by caring for their loved one:   [] Yes /  [x] No /  [] No Caregiver Present/Available [] No Caregiver [] Pt Lives at Facility  If \"Yes\" to discuss with social work during IDT    Anticipatory grief assessment:   [x] Normal  /

## 2024-10-11 NOTE — ACP (ADVANCE CARE PLANNING)
Advance Care Planning      Palliative Medicine Provider (MD/NP)  Advance Care Planning (ACP) Conversation      Date of Conversation: 10/11/24  The patient and/or authorized decision maker consented to a voluntary Advance Care Planning conversation.   Individuals present for the conversation:   Patient with decision making capacity, Daughter Yumiko, and Granddaughter Deysi Kay    Legal Healthcare Agent(s):      ACP documents available in EMR prior to discussion:  -None    Primary Palliative Diagnosis(es):  A-Fib  H/o Gastric cancer  Renal mass    Conversation Summary:  Advance Care Planning (ACP) Physician/NP/PA Conversation     Date of Conversation: 10/11/2024  Conducted with: Legal next of kin  Other persons present: Maricruz Van and Deysi Kay     Healthcare Decision Maker: Primary Decision Maker:     Click here to complete Healthcare Decision Makers including selection of the Healthcare Decision Maker Relationship (ie \"Primary\").   Today we documented Decision Maker(s) consistent with Legal Next of Kin hierarchy.     Care Preferences:     Hospitalization: \"If your health worsens and it becomes clear that your chance of recovery is unlikely, what would be your preference regarding hospitalization?\"  The patient would prefer comfort-focused treatment without hospitalization.     Ventilation: \"If you were unable to breath on your own and your chance of recovery was unlikely, what would be your preference about the use of a ventilator (breathing machine) if it was available to you?\"  The patient would NOT desire the use of a ventilator.     Resuscitation: \"In the event your heart stopped as a result of an underlying serious health condition, would you want attempts made to restart your heart, or would you prefer a natural death?\"  No, do NOT attempt to resuscitate.        Conversation Outcomes / Follow-Up Plan:  ACP complete - no further action today  Reviewed DNR/DNI and  patient confirms current DNR status - completed forms on file (place new order if needed)    Resuscitation Status:  DNR    Outcomes / Completed Documentation:  An explanation of advance directives and their importance was provided and the following forms completed:    -Power of  for Healthcare, -Living Will, and -Portable DNR    If new document completed, original was provided to patient and/or family member.    Copy was placed for scanning into the [facility] EMR.      I spent 40 minutes providing separately identifiable ACP services with the patient and/or surrogate decision maker in a voluntary, in-person conversation discussing the patient's wishes and goals as detailed in the above note.       Patrick Louis, APRN - CNP

## 2024-10-11 NOTE — CARE COORDINATION
Care Management Initial Assessment       RUR:  Readmission? No  1st IM letter given? Yes - 10/10/24  1st  letter given: No     10/11/24 6482   Service Assessment   Patient Orientation Alert and Oriented   Cognition Alert   History Provided By Patient   Primary Caregiver Self   Support Systems Family Members   Patient's Healthcare Decision Maker is: Legal Next of Kin   PCP Verified by CM Yes   Last Visit to PCP Within last 6 months   Prior Functional Level Assistance with the following:;Housework;Shopping;Mobility;Cooking;Dressing;Bathing   Current Functional Level Assistance with the following:;Mobility;Shopping;Housework;Cooking;Bathing;Dressing   Can patient return to prior living arrangement Yes   Ability to make needs known: Good   Family able to assist with home care needs: Yes   Would you like for me to discuss the discharge plan with any other family members/significant others, and if so, who? No   Financial Resources Medicare  ([employer] GOLD PLUS HMO)   Social/Functional History   Lives With Family   Type of Home House   Home Layout Two level   Home Access Ramped entrance   Home Equipment Cane;Walker - Rolling   Receives Help From Family   ADL Assistance Needs assistance   Toileting Needs assistance   Homemaking Assistance Needs assistance   Ambulation Assistance Needs assistance   Transfer Assistance Needs assistance   Occupation Retired   Discharge Planning   Living Arrangements Family Members   Type of Home Care Services PT;OT

## 2024-10-11 NOTE — CONSULTS
Assessment and Plan:     Problem List Items Addressed This Visit     None      Visit Diagnoses     Screening for colon cancer    -  Primary    Encounter for hepatitis C screening test for low risk patient          Medicare annual wellness visit, subsequent  Doing well, up to date with all screening  Due for mammogram   Give 5 wishes booklet for patient  Preventive health issues were discussed with patient, and age appropriate screening tests were ordered as noted in patient's After Visit Summary  Personalized health advice and appropriate referrals for health education or preventive services given if needed, as noted in patient's After Visit Summary       History of Present Illness:     Patient presents for Medicare Annual Wellness visit    Patient Care Team:  Rayray Nicole MD as PCP - General     Problem List:     Patient Active Problem List   Diagnosis    Age related osteoporosis    GERD (gastroesophageal reflux disease)    Hypercholesterolemia    Hypertension, essential    Itchy skin    Right knee pain    Palpitations    Breast pain, right    Breast mass, right    Other insomnia    Memory difficulty    Blurry vision, bilateral    Other fatigue      Past Medical and Surgical History:     Past Medical History:   Diagnosis Date    No known health problems      Past Surgical History:   Procedure Laterality Date    HIP SURGERY        Family History:     Family History   Problem Relation Age of Onset    No Known Problems Mother         no pertinent family hx    Hypertension Family       Social History:        Social History     Socioeconomic History    Marital status:      Spouse name: Not on file    Number of children: Not on file    Years of education: Not on file    Highest education level: Not on file   Occupational History    Not on file   Social Needs    Financial resource strain: Not on file    Food insecurity:     Worry: Not on file     Inability: Not on file   lEle [place] Transportation needs:     Medical: Not on file     Non-medical: Not on file   Tobacco Use    Smoking status: Never Smoker    Smokeless tobacco: Never Used   Substance and Sexual Activity    Alcohol use: No    Drug use: Never    Sexual activity: Not on file   Lifestyle    Physical activity:     Days per week: Not on file     Minutes per session: Not on file    Stress: Not on file   Relationships    Social connections:     Talks on phone: Not on file     Gets together: Not on file     Attends Scientologist service: Not on file     Active member of club or organization: Not on file     Attends meetings of clubs or organizations: Not on file     Relationship status: Not on file    Intimate partner violence:     Fear of current or ex partner: Not on file     Emotionally abused: Not on file     Physically abused: Not on file     Forced sexual activity: Not on file   Other Topics Concern    Not on file   Social History Narrative    Not on file      Medications and Allergies:     Current Outpatient Medications   Medication Sig Dispense Refill    acetaminophen (TYLENOL) 500 mg tablet Take 1 tablet (500 mg total) by mouth every 6 (six) hours as needed for mild pain 30 tablet 0    alendronate (FOSAMAX) 70 mg tablet Take 1 tablet (70 mg total) by mouth once a week 12 tablet 3    atorvastatin (LIPITOR) 10 mg tablet Take 10 mg by mouth      cetirizine (ZyrTEC) 10 mg tablet TAKE 1 TABLET BY MOUTH DAILY AS DIRECTED   90 tablet 1    donepezil (ARICEPT) 5 mg tablet Take 1 tablet by mouth      hydrochlorothiazide (HYDRODIURIL) 25 mg tablet TAKE 1 TABLET DAILY 90 tablet 2    hydrocortisone 1 % cream Apply topically 2 (two) times a day 30 g 0    lisinopril (ZESTRIL) 40 mg tablet TAKE 1 TABLET DAILY 90 tablet 2    omeprazole (PriLOSEC) 40 MG capsule TAKE 1 CAPSULE BY MOUTH DAILY 90 capsule 0    SM MELATONIN 3 MG TAKE 1 TABLET (3 MG TOTAL) BY MOUTH DAILY AT BEDTIME 90 tablet 0    triamcinolone (KENALOG) 0 1 % ointment AS DIRECTED 80 g 4     No current facility-administered medications for this visit  No Known Allergies   Immunizations:     Immunization History   Administered Date(s) Administered    Influenza Quadrivalent Preservative Free 3 years and older IM 12/04/2017    Influenza Split High Dose Preservative Free IM 09/26/2016    Influenza, high dose seasonal 0 5 mL 01/15/2019, 11/25/2019    Pneumococcal Conjugate 13-Valent 04/13/2015    Tdap 01/11/2016      Health Maintenance:         Topic Date Due    Hepatitis C Screening  1948    CRC Screening: Colonoscopy  1948    MAMMOGRAM  03/12/2021         Topic Date Due    Pneumococcal Vaccine: 65+ Years (2 of 2 - PPSV23) 04/13/2016      Medicare Health Risk Assessment:     There were no vitals taken for this visit  Health Risk Assessment:   Patient rates overall health as good  Patient feels that their physical health rating is slightly worse  Eyesight was rated as slightly worse  Hearing was rated as same  Patient feels that their emotional and mental health rating is slightly worse  Pain experienced in the last 7 days has been none  Patient states that she has experienced no weight loss or gain in last 6 months  Just getting more tired    Depression Screening:   PHQ-2 Score: 0      Fall Risk Screening: In the past year, patient has experienced: no history of falling in past year      Urinary Incontinence Screening:   Patient has not leaked urine accidently in the last six months  Home Safety:  Patient does not have trouble with stairs inside or outside of their home  Patient has working smoke alarms and has no working carbon monoxide detector  Home safety hazards include: none  Nutrition:   Current diet is Regular  Eating regular diet    Medications:   Patient is not currently taking any over-the-counter supplements  Patient is able to manage medications       Activities of Daily Living (ADLs)/Instrumental Activities of Daily Living (IADLs): Walk and transfer into and out of bed and chair?: Yes  Dress and groom yourself?: Yes    Bathe or shower yourself?: Yes    Feed yourself? Yes  Do your laundry/housekeeping?: Yes  Manage your money, pay your bills and track your expenses?: Yes  Make your own meals?: Yes    Do your own shopping?: Yes    Previous Hospitalizations:   Any hospitalizations or ED visits within the last 12 months?: No      Advance Care Planning:     Five wishes given: Yes      Comments: Give 5 wishes booklet in [place] for patient and her son to review, fill out and bring back  Cognitive Screening:   Provider or family/friend/caregiver concerned regarding cognition?: Yes  Mini-Mental Status Exam (MMSE) Score: 26  Interpretation: MMSE Score 24-30: Normal Cognition     Cognition Comments: Forget what she plan to get in the kitchen  Patient only had 3rd grade education  Clock draw: 3  MMSE: missing 1 recall objects, not knowing state, city, missing 1 from serial 7      PREVENTIVE SCREENINGS      Cardiovascular Screening:    General: Screening Not Indicated and History Lipid Disorder      Diabetes Screening:     General: Screening Current      Colorectal Cancer Screening:     General: Screening Current      Breast Cancer Screening:     General: Screening Current      Cervical Cancer Screening:    General: Screening Not Indicated      Osteoporosis Screening:    General: Screening Not Indicated and History Osteoporosis      Lung Cancer Screening:     General: Screening Not Indicated      Hepatitis C Screening:    General: Risks and Benefits Discussed    Hep C Screening Accepted: Yes        Abdias Chavez MD dysuria  Integument:  negative for rash and pruritus  Heme:  negative for easy bruising and gum/nose bleeding  Musculoskel: negative for myalgias, back pain and muscle weakness  Neuro:  negative for headaches, dizziness, vertigo  Psych: negative for feelings of anxiety, depression      Objective:   Patient Vitals for the past 8 hrs:   BP Temp Temp src Pulse Resp Weight   10/11/24 1000 -- -- -- 74 -- --   10/11/24 0700 (!) 143/72 97.7 °F (36.5 °C) Oral 78 16 --   10/11/24 0600 -- -- -- 76 -- --   10/11/24 0559 -- -- -- -- -- 69.9 kg (154 lb 1.6 oz)   10/11/24 0436 (!) 142/69 -- -- -- -- --   10/11/24 0345 (!) 158/75 -- -- -- -- --     No intake/output data recorded.  No intake/output data recorded.      PHYSICAL EXAM:  General appearance: cooperative, elderly AAF, no distress, appears stated age  Skin: Extremities and face reveal no rashes.   HEENT: Sclerae anicteric.  Cardiovascular: Regular rate and rhythm. No murmurs, gallops, or rubs.   Respiratory: Comfortable breathing with no accessory muscle use. Clear breath sounds with no wheezes, rales, or rhonchi.   GI: Abdomen nondistended, soft, and nontender. Normal active bowel sounds. No enlargement of the liver or spleen. No masses palpable.   Rectal: Deferred   Musculoskeletal: No pitting edema of the lower legs. Left lower leg wrapped with ACE wrap   Neurological: Gross memory appears intact. Patient is alert and oriented.   Psychiatric: Mood appears appropriate with good judgement.  No anxiety or agitation.      Data Review     Recent Labs     10/10/24  1906   WBC 6.3   HGB 12.2   HCT 39.6        Recent Labs     10/10/24  1906   *   K HEMOLYZED,RECOLLECT REQUESTED   CL 96*   CO2 35*   BUN 13     Recent Labs     10/10/24  1906   GLOB 4.9*     Recent Labs     10/10/24  1906   INR 1.3*   APTT 43.6*        Imaging studies reviewed    Assessment / Plan :     89 yo female with h/o gastric cancer s/p partial gastric resection (2016), PUD, diverticulosis,  and afib on Xarelto. Presents with hematochezia and clots.  Last dose of Xarelto was evening of 10/9/24. Hgb in ED was 12.2.  CTAP in ED positive for active GI bleed involving the distal descending colon. This may be secondary to colitis.  IR consulted and determined conservative management due to stable hgb.      - Hgb has not been rechecked since last night in ED, recheck hgb now transfuse for hgb <7  - No plans for endoscopic intervention currently  - Ok to start Clear liquid diet   - Continue to hold Anticoagulation   - PPI BID  - Palliative care consulted - C, findings on CTAP   - Supportive care  - Thank you for the consultation   - GI will follow        Patient Active Hospital Problem List:   Principal Problem:    GI bleed  Resolved Problems:    * No resolved hospital problems. *    AL Kaminski - NP

## 2024-10-11 NOTE — H&P
History & Physical    Primary Care Provider: Valentina Hills MD  Source of Information: Patient and chart review    History of Presenting Illness:   Elizabeth Kay is a 89 y.o. female with history of A-fib on Xarelto, CHF, hypertension, peptic ulcer disease, history of gastric cancer status post partial gastric resection who presented to ED with complaints of rectal bleeding.  States she had been in usual state of health until earlier this evening when she noted painless rectal bleeding both with and without bowel movement.  Describes bright red blood per rectum.  Denies any previous history of GI bleeds.  The patient denies any fever, chills, chest or abdominal pain, nausea, vomiting, cough, congestion, recent illness, palpitations, or dysuria.    Remarkable vitals on ER Presentation: BP to 181/86  Labs Remarkable for: Hemoglobin 12.2 and stable  ER Images: CT abdomen and pelvis:   Study is positive for active GI bleeding involving the distal descending colon.   Descending and sigmoid colitis.  Renal mass, adrenal mass and retroperitoneal lymphadenopathy.  ER Rx: None     Review of Systems:  Pertinent items are noted in the History of Present Illness.     Past Medical History:   Diagnosis Date    A-fib (HCC)     Arthritis     Cancer (HCC)     STOMACH    Hypertension     PUD (peptic ulcer disease)       Past Surgical History:   Procedure Laterality Date    HEENT Right 2013    RESTORED VISION    HYSTERECTOMY (CERVIX STATUS UNKNOWN)  1971    [place] UNLISTED PROCEDURE ABDOMEN PERITONEUM & OMENTUM  2016    REMOVED CANCER FROM STOMACH     Prior to Admission medications    Medication Sig Start Date End Date Taking? Authorizing Provider   acetaminophen (TYLENOL) 325 MG tablet Take 2 tablets by mouth every 6 hours 7/28/22   Automatic Reconciliation, Ar   flecainide (TAMBOCOR) 50 MG tablet Take 1 tablet by mouth in the morning and 1 tablet in the evening. 5/16/22   Automatic Reconciliation, Ar   furosemide (LASIX) 40 MG  Mucosa normal.        Neck: Supple, symmetrical, trachea midline.       Lungs:   Clear to auscultation bilaterally.   Chest wall:  No tenderness or deformity.   Heart:  Regular rate and rhythm, S1, S2 normal   Abdomen:   Soft, non-tender. Bowel sounds normal. No masses,  No organomegaly.   Extremities: Extremities normal, atraumatic, no cyanosis or edema.   Pulses: 2+ and symmetric all extremities.   Skin: Skin color, texture, turgor normal. No rashes or lesions   Neurologic: CNII-XII grossly intact.      Data Review:     Recent Days:  Recent Labs     10/10/24  1906   WBC 6.3   HGB 12.2   HCT 39.6        Recent Labs     10/10/24  1906   *   K HEMOLYZED,RECOLLECT REQUESTED   CL 96*   CO2 35*   BUN 13   ALT 12   INR 1.3*     No results for input(s): \"PH\", \"PCO2\", \"PO2\", \"HCO3\", \"FIO2\" in the last 72 hours.    24 Hour Results:  Recent Results (from the past 24 hour(s))   CBC with Auto Differential    Collection Time: 10/10/24  7:06 PM   Result Value Ref Range    WBC 6.3 3.6 - 11.0 K/uL    RBC 4.59 3.80 - 5.20 M/uL    Hemoglobin 12.2 11.5 - 16.0 g/dL    Hematocrit 39.6 35.0 - 47.0 %    MCV 86.3 80.0 - 99.0 FL    MCH 26.6 26.0 - 34.0 PG    MCHC 30.8 30.0 - 36.5 g/dL    RDW 16.6 (H) 11.5 - 14.5 %    Platelets 385 150 - 400 K/uL    MPV 10.2 8.9 - 12.9 FL    Nucleated RBCs 0.0 0  WBC    nRBC 0.00 0.00 - 0.01 K/uL    Neutrophils % 77 (H) 32 - 75 %    Lymphocytes % 12 12 - 49 %    Monocytes % 10 5 - 13 %    Eosinophils % 0 0 - 7 %    Basophils % 0 0 - 1 %    Immature Granulocytes % 1 (H) 0.0 - 0.5 %    Neutrophils Absolute 4.8 1.8 - 8.0 K/UL    Lymphocytes Absolute 0.8 0.8 - 3.5 K/UL    Monocytes Absolute 0.6 0.0 - 1.0 K/UL    Eosinophils Absolute 0.0 0.0 - 0.4 K/UL    Basophils Absolute 0.0 0.0 - 0.1 K/UL    Immature Granulocytes Absolute 0.1 (H) 0.00 - 0.04 K/UL    Differential Type SMEAR SCANNED      RBC Comment ANISOCYTOSIS  1+       Comprehensive Metabolic Panel    Collection Time: 10/10/24  7:06 PM

## 2024-10-11 NOTE — ED PROVIDER NOTES
EMERGENCY DEPARTMENT PHYSICIAN NOTE     Patient: Elizabeht Kay     Time of Service: 10/10/2024  8:14 PM     Chief complaint:   Chief Complaint   Patient presents with    Rectal Bleeding        HISTORY:  Patient is a 89 y.o. female who presents to the emergency department with complaints of acute bright red blood per rectum.  Bleeding started today.  Bleeding is painless.  Patient is on rivaroxaban for A-fib.  No history of GI bleeds in the past.  Vital signs are stable.  Patient is afebrile      Past Medical History:   Diagnosis Date    A-fib (HCC)     Arthritis     Cancer (HCC)     STOMACH    Hypertension     PUD (peptic ulcer disease)         Past Surgical History:   Procedure Laterality Date    HEENT Right 2013    RESTORED VISION    HYSTERECTOMY (CERVIX STATUS UNKNOWN)  1971    [place] UNLISTED PROCEDURE ABDOMEN PERITONEUM & OMENTUM  2016    REMOVED CANCER FROM STOMACH        Family History   Problem Relation Age of Onset    Diabetes Son     Cancer Son     Hypertension Son     Anesth Problems Neg Hx     Headache Daughter     Cancer Sister     Diabetes Sister     Cancer Mother         Social History     Socioeconomic History    Marital status:    Tobacco Use    Smoking status: Never    Smokeless tobacco: Never   Substance and Sexual Activity    Alcohol use: Not Currently    Drug use: Not Currently        Current Medications: Reviewed in chart.    Allergies:   Allergies   Allergen Reactions    Naproxen Sodium      Other reaction(s): Unknown (comments)          REVIEW OF SYSTEMS: See HPI for pertinent positives and negatives.      PHYSICAL EXAM:  BP (!) 181/86   Pulse 81   Temp 97.5 °F (36.4 °C) (Oral)   Resp 21   Ht 1.499 m (4' 11\")   Wt 69.9 kg (154 lb 1.6 oz)   SpO2 100%   BMI 31.12 kg/m²    Physical Exam  Vitals and nursing note reviewed.   Constitutional:       Appearance: Normal appearance.   HENT:      Head: Normocephalic and atraumatic.      Right Ear: External ear normal.      Left Ear:  External ear normal.      Nose: Nose normal.   Eyes:      Conjunctiva/sclera: Conjunctivae normal.   Cardiovascular:      Rate and Rhythm: Normal rate and regular rhythm.      Pulses: Normal pulses.   Pulmonary:      Effort: Pulmonary effort is normal.      Breath sounds: Normal breath sounds.   Abdominal:      Palpations: Abdomen is soft.      Tenderness: There is no abdominal tenderness.   Genitourinary:     Comments: Bright red blood on rectal exam.  No tenderness.  No external hemorrhoids, no palpable internal hemorrhoid  Musculoskeletal:         General: No tenderness.   Skin:     General: Skin is warm and dry.   Neurological:      General: No focal deficit present.      Mental Status: She is alert and oriented to person, place, and time.   Psychiatric:         Mood and Affect: Mood normal.         Behavior: Behavior normal.           ED Course:    ED Course as of 10/11/24 0006   Thu Oct 10, 2024   2019 INR(!): 1.3 [AL]   2019 Hemoglobin Quant: 12.2 [AL]      ED Course User Index  [AL] Carroll Coffey MD           Laboratory Results:  Labs Reviewed   CBC WITH AUTO DIFFERENTIAL - Abnormal; Notable for the following components:       Result Value    RDW 16.6 (*)     Neutrophils % 77 (*)     Immature Granulocytes % 1 (*)     Immature Granulocytes Absolute 0.1 (*)     All other components within normal limits   COMPREHENSIVE METABOLIC PANEL - Abnormal; Notable for the following components:    Sodium 133 (*)     Chloride 96 (*)     CO2 35 (*)     Glucose 111 (*)     Est, Glom Filt Rate 59 (*)     Albumin 2.5 (*)     Globulin 4.9 (*)     Albumin/Globulin Ratio 0.5 (*)     All other components within normal limits   APTT - Abnormal; Notable for the following components:    APTT 43.6 (*)     All other components within normal limits   PROTIME-INR - Abnormal; Notable for the following components:    INR 1.3 (*)     Protime 13.7 (*)     All other components within normal limits   TYPE AND SCREEN     ED physician

## 2024-10-11 NOTE — ED NOTES
Bedside and Verbal shift change report given to LIBERTY RN (oncoming nurse) by YAMILET Sánchez (offgoing nurse). Report included the following information Nurse Handoff Report and Index.

## 2024-10-12 LAB
BASOPHILS # BLD: 0 K/UL (ref 0–0.1)
BASOPHILS NFR BLD: 0 % (ref 0–1)
COMMENT:: NORMAL
DIFFERENTIAL METHOD BLD: ABNORMAL
EOSINOPHIL # BLD: 0 K/UL (ref 0–0.4)
EOSINOPHIL NFR BLD: 1 % (ref 0–7)
ERYTHROCYTE [DISTWIDTH] IN BLOOD BY AUTOMATED COUNT: 16.9 % (ref 11.5–14.5)
HCT VFR BLD AUTO: 28.7 % (ref 35–47)
HGB BLD-MCNC: 9 G/DL (ref 11.5–16)
IMM GRANULOCYTES # BLD AUTO: 0.1 K/UL (ref 0–0.04)
IMM GRANULOCYTES NFR BLD AUTO: 1 % (ref 0–0.5)
LYMPHOCYTES # BLD: 0.8 K/UL (ref 0.8–3.5)
LYMPHOCYTES NFR BLD: 14 % (ref 12–49)
MCH RBC QN AUTO: 26.8 PG (ref 26–34)
MCHC RBC AUTO-ENTMCNC: 31.4 G/DL (ref 30–36.5)
MCV RBC AUTO: 85.4 FL (ref 80–99)
MONOCYTES # BLD: 0.7 K/UL (ref 0–1)
MONOCYTES NFR BLD: 12 % (ref 5–13)
NEUTS SEG # BLD: 4.1 K/UL (ref 1.8–8)
NEUTS SEG NFR BLD: 73 % (ref 32–75)
NRBC # BLD: 0 K/UL (ref 0–0.01)
NRBC BLD-RTO: 0 PER 100 WBC
PLATELET # BLD AUTO: 294 K/UL (ref 150–400)
RBC # BLD AUTO: 3.36 M/UL (ref 3.8–5.2)
SPECIMEN HOLD: NORMAL
WBC # BLD AUTO: 5.7 K/UL (ref 3.6–11)

## 2024-10-12 PROCEDURE — 2580000003 HC RX 258: Performed by: NURSE PRACTITIONER

## 2024-10-12 PROCEDURE — 6360000002 HC RX W HCPCS: Performed by: NURSE PRACTITIONER

## 2024-10-12 PROCEDURE — 6370000000 HC RX 637 (ALT 250 FOR IP): Performed by: INTERNAL MEDICINE

## 2024-10-12 PROCEDURE — 85025 COMPLETE CBC W/AUTO DIFF WBC: CPT

## 2024-10-12 PROCEDURE — 2580000003 HC RX 258: Performed by: STUDENT IN AN ORGANIZED HEALTH CARE EDUCATION/TRAINING PROGRAM

## 2024-10-12 PROCEDURE — 2060000000 HC ICU INTERMEDIATE R&B

## 2024-10-12 PROCEDURE — 6370000000 HC RX 637 (ALT 250 FOR IP): Performed by: STUDENT IN AN ORGANIZED HEALTH CARE EDUCATION/TRAINING PROGRAM

## 2024-10-12 RX ORDER — LOSARTAN POTASSIUM 50 MG/1
50 TABLET ORAL DAILY
Status: DISCONTINUED | OUTPATIENT
Start: 2024-10-12 | End: 2024-10-18 | Stop reason: HOSPADM

## 2024-10-12 RX ORDER — HYDRALAZINE HYDROCHLORIDE 20 MG/ML
5 INJECTION INTRAMUSCULAR; INTRAVENOUS EVERY 6 HOURS PRN
Status: DISCONTINUED | OUTPATIENT
Start: 2024-10-12 | End: 2024-10-17

## 2024-10-12 RX ADMIN — HYDRALAZINE HYDROCHLORIDE 5 MG: 20 INJECTION INTRAMUSCULAR; INTRAVENOUS at 21:58

## 2024-10-12 RX ADMIN — TIMOLOL MALEATE 1 DROP: 5 SOLUTION OPHTHALMIC at 21:55

## 2024-10-12 RX ADMIN — TIMOLOL MALEATE 1 DROP: 5 SOLUTION OPHTHALMIC at 10:38

## 2024-10-12 RX ADMIN — SODIUM CHLORIDE, PRESERVATIVE FREE 10 ML: 5 INJECTION INTRAVENOUS at 21:55

## 2024-10-12 RX ADMIN — ACETAMINOPHEN 650 MG: 325 TABLET ORAL at 22:05

## 2024-10-12 RX ADMIN — SODIUM CHLORIDE, PRESERVATIVE FREE 10 ML: 5 INJECTION INTRAVENOUS at 10:38

## 2024-10-12 RX ADMIN — ACETAMINOPHEN 650 MG: 325 TABLET ORAL at 15:29

## 2024-10-12 RX ADMIN — PANTOPRAZOLE SODIUM 40 MG: 40 INJECTION, POWDER, FOR SOLUTION INTRAVENOUS at 13:22

## 2024-10-12 RX ADMIN — PANTOPRAZOLE SODIUM 40 MG: 40 INJECTION, POWDER, FOR SOLUTION INTRAVENOUS at 23:53

## 2024-10-12 RX ADMIN — LOSARTAN POTASSIUM 50 MG: 50 TABLET, FILM COATED ORAL at 10:38

## 2024-10-12 ASSESSMENT — PAIN DESCRIPTION - LOCATION
LOCATION: BACK;GENERALIZED
LOCATION: ABDOMEN

## 2024-10-12 ASSESSMENT — PAIN SCALES - GENERAL
PAINLEVEL_OUTOF10: 2
PAINLEVEL_OUTOF10: 3

## 2024-10-12 NOTE — CASE COMMUNICATION
ABDULAZIZ-Patient is to go home with [facility].     CM set up transportation for Tuesday 10/15/24 at Noon with [facility] per hospice nurse request.     Mame Lewis, BSN, RN, ONC, CMSRN  Nurse Care Manager

## 2024-10-12 NOTE — PROGRESS NOTES
[facility]  Good Help to Those in Need  [phone]     Patient Name: Elizabeth Kay  YOB: 1930  Age: 89 y.o.    [facility] RN Note:  Hospice consult received, reviewing chart. Will follow up with Unit Nurse and Care Manager to discuss plan of care, patient status and discharge disposition within the hour.       Thank you for the opportunity to be of service to this patient.     Bri Valero RN  Clinical Nurse Liaison  [facility]   [phone] Mobile  [phone] Office   Available on Perfect Serve

## 2024-10-12 NOTE — PROGRESS NOTES
Hospitalist Progress Note  Heriberto Camacho MD  Answering service: [phone] OR [phone] from in house phone        Date of Service:  10/12/2024  NAME:  Elizabeth Kay  :  3/21/1930  MRN:  [id number]      Admission Summary:   Elizabeth Kay is a 89 y.o. female with history of A-fib on Xarelto, CHF, hypertension, peptic ulcer disease, history of gastric cancer status post partial gastric resection who presented to ED with complaints of rectal bleeding.  States she had been in usual state of health until earlier this evening when she noted painless rectal bleeding both with and without bowel movement.  Describes bright red blood per rectum.  Denies any previous history of GI bleeds       Interval history / Subjective:   Follow up GI bleeding, afib, renal mass  Had one BM overnight.   Tolerating CLD     Assessment & Plan:     GI / Rectal Bleed in the setting of taking Xarelto  -CT A/P: Active bleeding distal descending colon  -suspect diverticular bleeding.  No pain or tenderness  -Discussed with GI on call.  Conservative management.   -appreciate Palliative care consult.  Hospice consult pending  -continue serial CBC  -tolerating CLD.  Advance to FLD.  DC IVFs  -continue holding xarelto     Left renal mass  Left adrenal mass  Retroperitoneal adenopathy  Moderate left pleural effusion  -Findings are concerning for metastatic cancer  -Left renal mass extending to the diaphragm may be causing pleural effusion  -Patient not interested in aggressive testing or treatment.  Appropriate for Hospice     Atrial fibrillation  -Continue PTA flecainide  -continue holding Xarelto.      Hypertension  -continue holding lasix  -restart losartan.  BP trending up     Dyslipidemia  -PTA pravastatin     Glaucoma  -Ophthalmic timolol twice daily     Code status: Full  Prophylaxis: SCD  Care Plan discussed with: Pt/ Daughter  Anticipated

## 2024-10-12 NOTE — CARE COORDINATION
ABDULAZIZ- Pending referral to [facility]    THELMA noted that this pt has a consult for hospice. THELMA spoke with pt's primary decision maker, Deysi Kay [phone]) about hospice and offered her freedom of choice. She would like a referral to be sent to [facility]. CM sent a referral to [facility] via [facility]. FABIOLA Amado,ACM-SW

## 2024-10-12 NOTE — PROGRESS NOTES
[facility]  Good Help to Those in Need  [phone]     Patient Name: Elizabeth Kay  YOB: 1930  Age: 89 y.o.    [facility] RN Note:  Hospice liaison met with patient who is A/O/D and wants to go home with hospice. Plan is for Monday discharge. I spoke with Maricruz and Deysi regarding plan.  They will need to move some furniture for a hospital bed. She could go home without hospital bed as has a stairlift to get upstairs.     Admission planned for Tuesday 10/15  at 2pm  CM  set up transport for Tuesday 10/15 at noon.  CTI from Dr Nair for gastric cancer  Consents: patient will sign own consents Monday  DME: [facility]ME [facility] to deliver BSC and transport chair 10/15 9am-12 noon  Meds: Iam will deliver SRX to the home by [employer] on Monday 10/14     Thank you for the opportunity to be of service to this patient.     Bri Valero RN  Clinical Nurse Liaison  [facility]   [phone] Mobile  [phone] Office   Available on Perfect Serve

## 2024-10-12 NOTE — PROGRESS NOTES
met with patient, while in ICU, completed the initial Spiritual Assessment of the patient, and offered Pastoral Care, see flow sheets for interventions. Patient was awake and talking well. He said he is [place] but does not attend a particular Judaism at this time. Pastoral support provided. Patient does not have any Jewish/cultural needs that will affect patients preferences in health care. Chart reviewed. Chaplains will continue to follow and will provide pastoral care on an as needed/as requested basis. Eloy Pickens MDiv.   Board Certified [employer] [phone] 12.2.  CTAP in ED positive for active GI bleed involving the distal descending colon. This may be secondary to colitis.  IR consulted and determined conservative management due to stable hgb.  Other consideration is probable diverticulosis.     -Serial CBCs, transfuse as needed  -With plans for hospice, can advance diet.  - Continue to hold Anticoagulation.  Resume when bleeding has stopped.  - Palliative care consulted and has seen patient.  Recommendation is for hospice.  - Supportive care  -Since patient is hospice, GI will sign off.  Please call back with any questions or concerns.  Plan discussed with nurse and Dr. Camacho.          Signed by: Ryley Linares MD         10/12/2024  9:18 AM

## 2024-10-13 LAB
BASOPHILS # BLD: 0 K/UL (ref 0–0.1)
BASOPHILS NFR BLD: 0 % (ref 0–1)
DIFFERENTIAL METHOD BLD: ABNORMAL
EOSINOPHIL # BLD: 0.1 K/UL (ref 0–0.4)
EOSINOPHIL NFR BLD: 1 % (ref 0–7)
ERYTHROCYTE [DISTWIDTH] IN BLOOD BY AUTOMATED COUNT: 16.9 % (ref 11.5–14.5)
HCT VFR BLD AUTO: 28.7 % (ref 35–47)
HGB BLD-MCNC: 8.9 G/DL (ref 11.5–16)
IMM GRANULOCYTES # BLD AUTO: 0.1 K/UL (ref 0–0.04)
IMM GRANULOCYTES NFR BLD AUTO: 1 % (ref 0–0.5)
LYMPHOCYTES # BLD: 0.7 K/UL (ref 0.8–3.5)
LYMPHOCYTES NFR BLD: 11 % (ref 12–49)
MCH RBC QN AUTO: 26.9 PG (ref 26–34)
MCHC RBC AUTO-ENTMCNC: 31 G/DL (ref 30–36.5)
MCV RBC AUTO: 86.7 FL (ref 80–99)
MONOCYTES # BLD: 0.8 K/UL (ref 0–1)
MONOCYTES NFR BLD: 12 % (ref 5–13)
NEUTS SEG # BLD: 4.9 K/UL (ref 1.8–8)
NEUTS SEG NFR BLD: 75 % (ref 32–75)
NRBC # BLD: 0 K/UL (ref 0–0.01)
NRBC BLD-RTO: 0 PER 100 WBC
PLATELET # BLD AUTO: 344 K/UL (ref 150–400)
PMV BLD AUTO: 9.7 FL (ref 8.9–12.9)
RBC # BLD AUTO: 3.31 M/UL (ref 3.8–5.2)
RBC MORPH BLD: ABNORMAL
WBC # BLD AUTO: 6.6 K/UL (ref 3.6–11)

## 2024-10-13 PROCEDURE — 6370000000 HC RX 637 (ALT 250 FOR IP): Performed by: STUDENT IN AN ORGANIZED HEALTH CARE EDUCATION/TRAINING PROGRAM

## 2024-10-13 PROCEDURE — 85025 COMPLETE CBC W/AUTO DIFF WBC: CPT

## 2024-10-13 PROCEDURE — 2580000003 HC RX 258: Performed by: NURSE PRACTITIONER

## 2024-10-13 PROCEDURE — 2580000003 HC RX 258: Performed by: STUDENT IN AN ORGANIZED HEALTH CARE EDUCATION/TRAINING PROGRAM

## 2024-10-13 PROCEDURE — 6370000000 HC RX 637 (ALT 250 FOR IP): Performed by: INTERNAL MEDICINE

## 2024-10-13 PROCEDURE — 2060000000 HC ICU INTERMEDIATE R&B

## 2024-10-13 PROCEDURE — 6360000002 HC RX W HCPCS: Performed by: NURSE PRACTITIONER

## 2024-10-13 RX ADMIN — TIMOLOL MALEATE 1 DROP: 5 SOLUTION OPHTHALMIC at 21:40

## 2024-10-13 RX ADMIN — ACETAMINOPHEN 650 MG: 325 TABLET ORAL at 21:38

## 2024-10-13 RX ADMIN — PANTOPRAZOLE SODIUM 40 MG: 40 INJECTION, POWDER, FOR SOLUTION INTRAVENOUS at 12:10

## 2024-10-13 RX ADMIN — SODIUM CHLORIDE, PRESERVATIVE FREE 10 ML: 5 INJECTION INTRAVENOUS at 21:50

## 2024-10-13 RX ADMIN — FLECAINIDE ACETATE 50 MG: 100 TABLET ORAL at 15:09

## 2024-10-13 RX ADMIN — TIMOLOL MALEATE 1 DROP: 5 SOLUTION OPHTHALMIC at 07:16

## 2024-10-13 RX ADMIN — LOSARTAN POTASSIUM 50 MG: 50 TABLET, FILM COATED ORAL at 08:35

## 2024-10-13 RX ADMIN — SODIUM CHLORIDE, PRESERVATIVE FREE 10 ML: 5 INJECTION INTRAVENOUS at 08:37

## 2024-10-13 RX ADMIN — ACETAMINOPHEN 650 MG: 325 TABLET ORAL at 07:11

## 2024-10-13 ASSESSMENT — PAIN SCALES - GENERAL
PAINLEVEL_OUTOF10: 6
PAINLEVEL_OUTOF10: 2
PAINLEVEL_OUTOF10: 0

## 2024-10-13 ASSESSMENT — PAIN DESCRIPTION - LOCATION
LOCATION: SHOULDER
LOCATION: ARM;SHOULDER

## 2024-10-13 ASSESSMENT — PAIN DESCRIPTION - DESCRIPTORS: DESCRIPTORS: ACHING

## 2024-10-13 NOTE — PROGRESS NOTES
Hospitalist Progress Note  Heriberto Camacho MD  Answering service: [phone] OR [phone] from in house phone        Date of Service:  10/13/2024  NAME:  Elizabeth Kay  :  3/21/1930  MRN:  [id number]      Admission Summary:   Elizabeth Kay is a 89 y.o. female with history of A-fib on Xarelto, CHF, hypertension, peptic ulcer disease, history of gastric cancer status post partial gastric resection who presented to ED with complaints of rectal bleeding.  States she had been in usual state of health until earlier this evening when she noted painless rectal bleeding both with and without bowel movement.  Describes bright red blood per rectum.  Denies any previous history of GI bleeds       Interval history / Subjective:   Follow up GI bleeding, afib, renal mass  Bleeding has stopped.  Tolerating FLD     Assessment & Plan:     GI / Rectal Bleed in the setting of taking Xarelto  -CT A/P: Active bleeding distal descending colon  -suspect diverticular bleeding.  No pain or tenderness  -Discussed with GI on call.  Conservative management.   -appreciate Palliative care consult.  Hospice consulted  -continue serial CBC.  Hg stable 9.1-->9.0-->8.9  -tolerating FLD.  Advance to soft diet  -continue holding xarelto  -DC planning to home with hospice support.  Transport set up for 10/15     Left renal mass  Left adrenal mass  Retroperitoneal adenopathy  Moderate left pleural effusion  -Findings are concerning for metastatic cancer  -Left renal mass extending to the diaphragm may be causing pleural effusion  -Patient not interested in aggressive testing or treatment.  Appropriate for Hospice     Atrial fibrillation  -Continue PTA flecainide  -continue holding Xarelto.      Hypertension  -restarted losartan.       Dyslipidemia  -PTA pravastatin     Glaucoma  -Ophthalmic timolol twice daily     Code status: Full  Prophylaxis: SCD  Care

## 2024-10-13 NOTE — PLAN OF CARE
Problem: Safety - Adult  Goal: Free from fall injury  10/13/2024 1107 by Edwina Daugherty, RN  Outcome: Progressing  10/13/2024 0124 by Airam Sheets, RN  Outcome: Progressing  Note: Bed is in the lowest position and wheels are locked, call bell is within reach, bathroom light is on during evening hours, gripper socks are on and patient has been instructed to call out for assistance if needed.     As of now, patient is free from falls and will continue to be monitored.

## 2024-10-14 LAB
BASOPHILS # BLD: 0 K/UL (ref 0–0.1)
BASOPHILS NFR BLD: 1 % (ref 0–1)
COMMENT:: NORMAL
DIFFERENTIAL METHOD BLD: ABNORMAL
EOSINOPHIL # BLD: 0.1 K/UL (ref 0–0.4)
EOSINOPHIL NFR BLD: 1 % (ref 0–7)
ERYTHROCYTE [DISTWIDTH] IN BLOOD BY AUTOMATED COUNT: 17.1 % (ref 11.5–14.5)
HCT VFR BLD AUTO: 30.5 % (ref 35–47)
HGB BLD-MCNC: 9.4 G/DL (ref 11.5–16)
IMM GRANULOCYTES # BLD AUTO: 0.1 K/UL (ref 0–0.04)
IMM GRANULOCYTES NFR BLD AUTO: 1 % (ref 0–0.5)
LYMPHOCYTES # BLD: 0.6 K/UL (ref 0.8–3.5)
LYMPHOCYTES NFR BLD: 10 % (ref 12–49)
MCH RBC QN AUTO: 26.8 PG (ref 26–34)
MCHC RBC AUTO-ENTMCNC: 30.8 G/DL (ref 30–36.5)
MCV RBC AUTO: 86.9 FL (ref 80–99)
MONOCYTES # BLD: 0.6 K/UL (ref 0–1)
MONOCYTES NFR BLD: 10 % (ref 5–13)
NEUTS SEG # BLD: 4.9 K/UL (ref 1.8–8)
NEUTS SEG NFR BLD: 77 % (ref 32–75)
NRBC # BLD: 0 K/UL (ref 0–0.01)
NRBC BLD-RTO: 0 PER 100 WBC
PLATELET # BLD AUTO: 329 K/UL (ref 150–400)
PMV BLD AUTO: 9.7 FL (ref 8.9–12.9)
RBC # BLD AUTO: 3.51 M/UL (ref 3.8–5.2)
SPECIMEN HOLD: NORMAL
WBC # BLD AUTO: 6.3 K/UL (ref 3.6–11)

## 2024-10-14 PROCEDURE — 2580000003 HC RX 258: Performed by: NURSE PRACTITIONER

## 2024-10-14 PROCEDURE — 2580000003 HC RX 258: Performed by: STUDENT IN AN ORGANIZED HEALTH CARE EDUCATION/TRAINING PROGRAM

## 2024-10-14 PROCEDURE — 6370000000 HC RX 637 (ALT 250 FOR IP): Performed by: INTERNAL MEDICINE

## 2024-10-14 PROCEDURE — 6370000000 HC RX 637 (ALT 250 FOR IP): Performed by: STUDENT IN AN ORGANIZED HEALTH CARE EDUCATION/TRAINING PROGRAM

## 2024-10-14 PROCEDURE — 1110000000 HC RM PRIVATE GYN

## 2024-10-14 PROCEDURE — 85025 COMPLETE CBC W/AUTO DIFF WBC: CPT

## 2024-10-14 PROCEDURE — 6360000002 HC RX W HCPCS: Performed by: NURSE PRACTITIONER

## 2024-10-14 RX ADMIN — SODIUM CHLORIDE, PRESERVATIVE FREE 10 ML: 5 INJECTION INTRAVENOUS at 08:47

## 2024-10-14 RX ADMIN — TIMOLOL MALEATE 1 DROP: 5 SOLUTION OPHTHALMIC at 20:54

## 2024-10-14 RX ADMIN — PANTOPRAZOLE SODIUM 40 MG: 40 INJECTION, POWDER, FOR SOLUTION INTRAVENOUS at 00:51

## 2024-10-14 RX ADMIN — PANTOPRAZOLE SODIUM 40 MG: 40 INJECTION, POWDER, FOR SOLUTION INTRAVENOUS at 11:37

## 2024-10-14 RX ADMIN — PANTOPRAZOLE SODIUM 40 MG: 40 INJECTION, POWDER, FOR SOLUTION INTRAVENOUS at 22:54

## 2024-10-14 RX ADMIN — FLECAINIDE ACETATE 50 MG: 100 TABLET ORAL at 17:07

## 2024-10-14 RX ADMIN — LOSARTAN POTASSIUM 50 MG: 50 TABLET, FILM COATED ORAL at 08:46

## 2024-10-14 RX ADMIN — ACETAMINOPHEN 650 MG: 325 TABLET ORAL at 11:37

## 2024-10-14 RX ADMIN — ACETAMINOPHEN 650 MG: 325 TABLET ORAL at 21:06

## 2024-10-14 RX ADMIN — TIMOLOL MALEATE 1 DROP: 5 SOLUTION OPHTHALMIC at 08:47

## 2024-10-14 RX ADMIN — SODIUM CHLORIDE, PRESERVATIVE FREE 5 ML: 5 INJECTION INTRAVENOUS at 20:53

## 2024-10-14 ASSESSMENT — PAIN DESCRIPTION - ORIENTATION
ORIENTATION: LEFT
ORIENTATION: RIGHT
ORIENTATION: LEFT

## 2024-10-14 ASSESSMENT — PAIN DESCRIPTION - LOCATION
LOCATION: ARM

## 2024-10-14 ASSESSMENT — PAIN SCALES - GENERAL
PAINLEVEL_OUTOF10: 3
PAINLEVEL_OUTOF10: 0
PAINLEVEL_OUTOF10: 3
PAINLEVEL_OUTOF10: 0

## 2024-10-14 ASSESSMENT — PAIN DESCRIPTION - DESCRIPTORS: DESCRIPTORS: ACHING

## 2024-10-14 NOTE — PROGRESS NOTES
[facility]  [facility]  [phone]     Patient Name: Elizabeth Kay  YOB: 1930  Age: 89 y.o.    [facility] RN Note:  Hospice liaison met bedside with patient and her daughter Mei.  The plan is now SNF for rehab to regain some strength prior to going home with hospice.  Hospice admission has been cancelled.     Thank you for the opportunity to be of service to this patient.     Bri Valero RN  Clinical Nurse Liaison  [facility]   [phone] Mobile  [phone] Office   Available on [employer]

## 2024-10-14 NOTE — DISCHARGE INSTRUCTIONS
Discharge Summary       PATIENT ID: Elizabeth Kay  MRN: [id number]   YOB: 1930    DATE OF ADMISSION: 10/10/2024  8:14 PM    DATE OF DISCHARGE: 10/17/2024  PRIMARY CARE PROVIDER: Valentina Hills MD     ATTENDING PHYSICIAN: Tiffanie  DISCHARGING PROVIDER: AL Farias NP    To contact this individual call [phone] and ask the  to page.  If unavailable ask to be transferred the Adult Hospitalist Department.    CONSULTATIONS: IP CONSULT TO GI  IP CONSULT TO CASE MANAGEMENT  IP CONSULT TO CASE MANAGEMENT  IP CONSULT TO UROLOGY  IP CONSULT TO HEMATOLOGY    PROCEDURES/SURGERIES: * No surgery found *     ADMITTING DIAGNOSES & HOSPITAL COURSE:     Elizabeth Kay is a 89 y.o. female with history of A-fib on Xarelto, CHF, hypertension, peptic ulcer disease, history of gastric cancer status post partial gastric resection who presented to ED with complaints of rectal bleeding.  States she had been in usual state of health until earlier this evening when she noted painless rectal bleeding both with and without bowel movement.  Describes bright red blood per rectum.  Denies any previous history of GI bleeds. The patients Xarelto was stopped during this admission and should not be resumed due to risk of GI bleeding. Her bleeding subsided and hemoglobin stabilized without the need for blood transfusion.    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 This is likely the cause for her shortness of breath and abdominal pain which she reports that she has been experiencing for 6 months. Initially, the patient was sure she wanted any additional workup or treatment, however she decided that she would like to at least consider her options. She underwent a CT guided biopsy of the mass on 10/17. She met with [facility] while admitted, Dr. Haas, who will meet again with her outpatient to discuss  biopsy results and any possible treatment options. It was unclear based on imaging, if the mass was arising from the kidney or invading the kidney as a metastasis from somewhere else. If it is primary renal cell carcinoma, she will need to be referred back to Urology for treatment options. CT of her chest shows a small simple right pleural effusion and moderate simple left pleural effusion. Will discharge patient with Lasix to treat , especially considering she is symptomatic with shortness of breath.        DISCHARGE DIAGNOSES / PLAN:      GI / Rectal Bleed in the setting of taking Xarelto (resolved)   -CT A/P: Active bleeding distal descending colon  -suspect diverticular bleeding.  No pain or tenderness  -Discussed with GI on call.  Conservative management.   -continue serial CBC.  Hg stable 9.1-->9.0-->8.9---> 9.4  -tolerating FLD.  Advance to regular diet   -continue holding xarelto at discharge      Left renal mass  Left adrenal mass  Retroperitoneal adenopathy  Moderate left pleural effusion  -Findings are concerning for metastatic cancer  -Left renal mass extending to the diaphragm may be causing pleural effusion  - urology consult placed , patient would like to know any possible treatment options   -  hematology/oncology consult placed  - CT guided biopsy obtained 10/17   - f/u outpatient  - one time dose of IV lasix today to hopefully aid in moderate left pleural effusion     Atrial fibrillation  -Continue PTA flecainide  -continue holding Xarelto.      Hypertension  -restarted losartan.       Dyslipidemia  -PTA pravastatin     Glaucoma  -Ophthalmic timolol twice daily          PENDING TEST RESULTS:   At the time of discharge the following test results are still pending:  Biopsy of mass invading left kidney, patient to have follow up with Dr. Haas to discuss these results     FOLLOW UP APPOINTMENTS:    Follow-up Information       Follow up With Specialties Details Why Contact Info    Valentina Hills MD

## 2024-10-14 NOTE — CARE COORDINATION
Transition of Care Plan:    RUR: 155-Low  Prior Level of Functioning: minimal assist  Disposition: SNF  If SNF or IPR: Date FOC offered: 10/14/24  Date FOC received: 10/14/24  Accepting facility: [facility]  Date authorization started with reference number: 10/15/24 # [id number]  Date authorization received and expires:   Follow up appointments:   DME needed:   Transportation at discharge: BLS  IM/IMM Medicare/ letter given:   Is patient a [employer] and connected with [facility]?   If yes, was  transfer form completed and VA notified?   Caregiver Contact:   Discharge Caregiver contacted prior to discharge?   Care Conference needed?   Barriers to discharge:   Met with patient and daughter at the bedside to discuss change in disposition. Patient now wants to got to SNF and pending her progress there, will either discharge home vs discharge home followed by Hospice. CM send referrals to [facility], [facility] and [facility] per her request. She will need authorization to admit. CM also placed consult to therapy to evaluate patient. CM following for ABDULAZIZ needs.  Hospice liaison aware and has cancelled admission. CM cancelled transportation scheduled with [facility].    Update 10/15/24  Accepted to [facility] for Rehab. CM started authorization today. Attending and family updated.

## 2024-10-14 NOTE — PROGRESS NOTES
Hospitalist Progress Note  Kaitlin Brito MD  Answering service: [phone] OR [phone] from in house phone        Date of Service:  10/14/2024  NAME:  Elizabeth Kay  :  3/21/1930  MRN:  [id number]      Admission Summary:   Elizabeth Kay is a 89 y.o. female with history of A-fib on Xarelto, CHF, hypertension, peptic ulcer disease, history of gastric cancer status post partial gastric resection who presented to ED with complaints of rectal bleeding.  States she had been in usual state of health until earlier this evening when she noted painless rectal bleeding both with and without bowel movement.  Describes bright red blood per rectum.  Denies any previous history of GI bleeds       Interval history / Subjective:   Follow up GI bleeding, afib, renal mass  Bleeding has stopped.  Tolerating FLD  Plan for hospice vs SNF seen by hospice care and then plan for SNF placement first and from there eventually home with hospice     Assessment & Plan:     GI / Rectal Bleed in the setting of taking Xarelto  -CT A/P: Active bleeding distal descending colon  -suspect diverticular bleeding.  No pain or tenderness  -Discussed with GI on call.  Conservative management.   -appreciate Palliative care consult.  Hospice consulted  -continue serial CBC.  Hg stable 9.1-->9.0-->8.9  -tolerating FLD.  Advance to soft diet  -continue holding xarelto  -DC planning to SNF with hospice support.       Left renal mass  Left adrenal mass  Retroperitoneal adenopathy  Moderate left pleural effusion  -Findings are concerning for metastatic cancer  -Left renal mass extending to the diaphragm may be causing pleural effusion  -Patient not interested in aggressive testing or treatment.  Appropriate for Hospice     Atrial fibrillation  -Continue PTA flecainide  -continue holding Xarelto.      Hypertension  -restarted losartan.       Dyslipidemia  -PTA

## 2024-10-15 PROCEDURE — 94760 N-INVAS EAR/PLS OXIMETRY 1: CPT

## 2024-10-15 PROCEDURE — 97530 THERAPEUTIC ACTIVITIES: CPT

## 2024-10-15 PROCEDURE — 97165 OT EVAL LOW COMPLEX 30 MIN: CPT

## 2024-10-15 PROCEDURE — 97116 GAIT TRAINING THERAPY: CPT

## 2024-10-15 PROCEDURE — 6370000000 HC RX 637 (ALT 250 FOR IP): Performed by: STUDENT IN AN ORGANIZED HEALTH CARE EDUCATION/TRAINING PROGRAM

## 2024-10-15 PROCEDURE — 6360000002 HC RX W HCPCS: Performed by: NURSE PRACTITIONER

## 2024-10-15 PROCEDURE — 2580000003 HC RX 258: Performed by: NURSE PRACTITIONER

## 2024-10-15 PROCEDURE — 97161 PT EVAL LOW COMPLEX 20 MIN: CPT

## 2024-10-15 PROCEDURE — 2700000000 HC OXYGEN THERAPY PER DAY

## 2024-10-15 PROCEDURE — 97535 SELF CARE MNGMENT TRAINING: CPT

## 2024-10-15 PROCEDURE — 6370000000 HC RX 637 (ALT 250 FOR IP): Performed by: INTERNAL MEDICINE

## 2024-10-15 PROCEDURE — 1110000000 HC RM PRIVATE GYN

## 2024-10-15 PROCEDURE — 2580000003 HC RX 258: Performed by: STUDENT IN AN ORGANIZED HEALTH CARE EDUCATION/TRAINING PROGRAM

## 2024-10-15 RX ADMIN — PANTOPRAZOLE SODIUM 40 MG: 40 INJECTION, POWDER, FOR SOLUTION INTRAVENOUS at 12:43

## 2024-10-15 RX ADMIN — FLECAINIDE ACETATE 50 MG: 100 TABLET ORAL at 04:17

## 2024-10-15 RX ADMIN — TIMOLOL MALEATE 1 DROP: 5 SOLUTION OPHTHALMIC at 20:52

## 2024-10-15 RX ADMIN — TIMOLOL MALEATE 1 DROP: 5 SOLUTION OPHTHALMIC at 09:48

## 2024-10-15 RX ADMIN — LOSARTAN POTASSIUM 50 MG: 50 TABLET, FILM COATED ORAL at 08:45

## 2024-10-15 RX ADMIN — SODIUM CHLORIDE, PRESERVATIVE FREE 10 ML: 5 INJECTION INTRAVENOUS at 20:52

## 2024-10-15 RX ADMIN — ACETAMINOPHEN 650 MG: 325 TABLET ORAL at 10:01

## 2024-10-15 RX ADMIN — SODIUM CHLORIDE, PRESERVATIVE FREE 10 ML: 5 INJECTION INTRAVENOUS at 09:05

## 2024-10-15 RX ADMIN — ACETAMINOPHEN 650 MG: 325 TABLET ORAL at 21:03

## 2024-10-15 RX ADMIN — FLECAINIDE ACETATE 50 MG: 100 TABLET ORAL at 16:57

## 2024-10-15 ASSESSMENT — PAIN SCALES - GENERAL
PAINLEVEL_OUTOF10: 3
PAINLEVEL_OUTOF10: 4

## 2024-10-15 ASSESSMENT — PAIN DESCRIPTION - LOCATION
LOCATION: SHOULDER
LOCATION: ARM

## 2024-10-15 ASSESSMENT — PAIN DESCRIPTION - ORIENTATION
ORIENTATION: RIGHT
ORIENTATION: RIGHT

## 2024-10-15 NOTE — PLAN OF CARE
Problem: Discharge Planning  Goal: Discharge to home or other facility with appropriate resources  10/15/2024 0235 by Patrizia Gonzalez RN  Outcome: Progressing  Flowsheets (Taken 10/14/2024 2000)  Discharge to home or other facility with appropriate resources:   Identify barriers to discharge with patient and caregiver   Arrange for needed discharge resources and transportation as appropriate   Identify discharge learning needs (meds, wound care, etc)   Refer to discharge planning if patient needs post-hospital services based on physician order or complex needs related to functional status, cognitive ability or social support system  10/14/2024 1622 by Perlita Gutierrez, RN  Outcome: Progressing     Problem: Skin/Tissue Integrity  Goal: Absence of new skin breakdown  Description: 1.  Monitor for areas of redness and/or skin breakdown  2.  Assess vascular access sites hourly  3.  Every 4-6 hours minimum:  Change oxygen saturation probe site  4.  Every 4-6 hours:  If on nasal continuous positive airway pressure, respiratory therapy assess nares and determine need for appliance change or resting period.  10/15/2024 0235 by Patrizia Gonzalez RN  Outcome: Progressing  10/14/2024 1622 by Perlita Gutierrez, RN  Outcome: Progressing     Problem: Safety - Adult  Goal: Free from fall injury  10/15/2024 0235 by Patrizia Gonzalez RN  Outcome: Progressing  10/14/2024 1622 by Perlita Gutierrez RN  Outcome: Progressing     Problem: Pain  Goal: Verbalizes/displays adequate comfort level or baseline comfort level  10/15/2024 0235 by Patrizia Gonzalez RN  Outcome: Progressing  10/14/2024 1622 by Perlita Gutierrez, RN  Outcome: Progressing

## 2024-10-15 NOTE — PLAN OF CARE
Problem: Occupational Therapy - Adult  Goal: By Discharge: Performs self-care activities at highest level of function for planned discharge setting.  See evaluation for individualized goals.  Description: FUNCTIONAL STATUS PRIOR TO ADMISSION:  Patient lives with daughter at home, patient able to complete most ADL without assistance (dressing, sponge bathing). Daughters assist with transportation and grocery shopping. Was receiving HH OT, PT and nursing. Used a RW outside of therapy and was progressing to SPC with HHPT. Baseline RA    HOME SUPPORT: Patient lived daughter who assisted PRN.    Occupational Therapy Goals:  Initiated 10/15/2024  1.  Patient will perform standing grooming with Contact Guard Assist within 7 day(s).  2.  Patient will perform LE dressing with Supervision within 7 day(s).  3.  Patient will perform UE dressing with Supervision within 7 day(s).  4.  Patient will perform toilet transfers with Contact Guard Assist  within 7 day(s).  5.  Patient will perform all aspects of toileting with Contact Guard Assist within 7 day(s).  6.  Patient will participate in upper extremity therapeutic exercise/activities with Supervision for 10 minutes within 7 day(s).    7.  Patient will utilize energy conservation techniques during functional activities with verbal cues within 7 day(s).    Outcome: Progressing   OCCUPATIONAL THERAPY EVALUATION  Patient: Elizabeth Kay (89 y.o. female)  Date: 10/15/2024  Primary Diagnosis: GI bleed [K92.2]  Lower GI bleed [K92.2]  On rivaroxaban therapy [Z79.01]       Precautions: Fall Risk                Chart, occupational therapy assessment, plan of care, and goals were reviewed.      ASSESSMENT  Patient continues to benefit from skilled OT services and is progressing towards goals. Patient received OOB in chair, amenable to session. Completed transfers with up to CGA/Supervision. Utilized RW for support once standing. Appeared to be below baseline as patient unable to

## 2024-10-15 NOTE — PLAN OF CARE
Independent (can do basic ADL including dressing/bathing without assistance. Req assist for IADL)  Toileting: Needs assistance  Homemaking Assistance: Needs assistance  Ambulation Assistance: Needs assistance  Transfer Assistance: Needs assistance  Occupation: Retired    Cognitive/Behavioral Status:  Orientation  Overall Orientation Status: Within Normal Limits  Orientation Level: Oriented X4  Cognition  Overall Cognitive Status: WNL  Hearing:   Hearing  Hearing: Within functional limits    Vision/Perceptual:          Vision  Vision: Impaired  Vision Exceptions: Wears glasses for reading  Perception  Overall Perceptual Status: WFL    Strength:    Strength: Generally decreased, functional    Tone & Sensation:   Tone: Normal  Sensation: Intact    Coordination:  Coordination: Generally decreased, functional    Range Of Motion:  AROM: Generally decreased, functional  PROM: Generally decreased, functional    Functional Mobility:  Bed Mobility:     Bed Mobility Training  Bed Mobility Training: Yes  Sit to Supine: Minimum assistance  Scooting: Stand-by assistance;Additional time  Transfers:     Transfer Training  Transfer Training: Yes  Sit to Stand: Contact-guard assistance  Stand to Sit: Contact-guard assistance  Balance:               Balance  Sitting: Intact  Standing: Impaired  Standing - Static: Constant support;Good  Standing - Dynamic: Constant support;Good  Ambulation/Gait Training:                       Gait  Gait Training: Yes  Overall Level of Assistance: Contact-guard assistance  Distance (ft): 10 Feet  Assistive Device: Gait belt;Walker, rolling  Base of Support: Narrowed  Speed/[place]: Slow  Step Length: Left shortened;Right shortened  Gait Abnormalities: Decreased step clearance                                                                                                                                                                                                                                                                                                 Activity Tolerance:   Fair     After treatment:   Patient left in no apparent distress in bed, Call bell within reach, and Heels elevated for pressure relief    COMMUNICATION/EDUCATION:   The patient's plan of care was discussed with: occupational therapist and registered nurse    Patient Education  Education Given To: Patient  Education Provided: Role of Therapy  Education Method: Verbal  Barriers to Learning: None  Education Outcome: Verbalized understanding    Thank you for this referral.  Sara Pelayo, PT  Minutes: 29      Physical Therapy Evaluation Charge Determination   History Examination Presentation Decision-Making   LOW Complexity : Zero comorbidities / personal factors that will impact the outcome / POC LOW Complexity : 1-2 Standardized tests and measures addressing body structure, function, activity limitation and / or participation in recreation  LOW Complexity : Stable, uncomplicated  AM-PAC  LOW    Based on the above components, the patient evaluation is determined to be of the following complexity level: Low

## 2024-10-15 NOTE — CARE COORDINATION
Transition of Care Plan:     RUR: 155-Low  Prior Level of Functioning: minimal assist  Disposition: SNF  If SNF or IPR: Date FOC offered: 10/14/24  Date FOC received: 10/14/24  Accepting facility: [facility]  Date authorization started with reference number:   Date authorization received and expires:   Follow up appointments:   DME needed:   Transportation at discharge: BLS  IM/IMM Medicare/ letter given:   Is patient a [place] and connected with [facility]?   If yes, was [place] transfer form completed and VA notified?   Caregiver Contact:   Discharge Caregiver contacted prior to discharge?   Care Conference needed?   Barriers to discharge:   Met with patient and daughter at the bedside to discuss change in disposition. Patient now wants to got to SNF and pending her progress there, will either discharge home vs discharge home followed by Hospice. CM send referrals to [facility], [facility] and [facility] per her request. She will need authorization to admit. CM also placed consult to therapy to evaluate patient. CM following for ABDULAZIZ needs.  Hospice liaison aware and has cancelled admission. CM cancelled transportation scheduled with [facility].    Update 10/15/24  Have received acceptance from [facility] and [facility]. Spoke with daughter this morning to update her. Waiting for therapy evaluation to begin authorization.

## 2024-10-16 ENCOUNTER — APPOINTMENT (OUTPATIENT)
Facility: HOSPITAL | Age: 89
DRG: 378 | End: 2024-10-16
Payer: MEDICARE

## 2024-10-16 PROCEDURE — 2580000003 HC RX 258: Performed by: NURSE PRACTITIONER

## 2024-10-16 PROCEDURE — 6360000002 HC RX W HCPCS: Performed by: NURSE PRACTITIONER

## 2024-10-16 PROCEDURE — 94760 N-INVAS EAR/PLS OXIMETRY 1: CPT

## 2024-10-16 PROCEDURE — 2700000000 HC OXYGEN THERAPY PER DAY

## 2024-10-16 PROCEDURE — 6370000000 HC RX 637 (ALT 250 FOR IP): Performed by: INTERNAL MEDICINE

## 2024-10-16 PROCEDURE — 97530 THERAPEUTIC ACTIVITIES: CPT

## 2024-10-16 PROCEDURE — 71250 CT THORAX DX C-: CPT

## 2024-10-16 PROCEDURE — 1100000000 HC RM PRIVATE

## 2024-10-16 PROCEDURE — 99223 1ST HOSP IP/OBS HIGH 75: CPT | Performed by: INTERNAL MEDICINE

## 2024-10-16 PROCEDURE — 97116 GAIT TRAINING THERAPY: CPT

## 2024-10-16 PROCEDURE — 6370000000 HC RX 637 (ALT 250 FOR IP): Performed by: STUDENT IN AN ORGANIZED HEALTH CARE EDUCATION/TRAINING PROGRAM

## 2024-10-16 PROCEDURE — 2580000003 HC RX 258: Performed by: STUDENT IN AN ORGANIZED HEALTH CARE EDUCATION/TRAINING PROGRAM

## 2024-10-16 RX ADMIN — TIMOLOL MALEATE 1 DROP: 5 SOLUTION OPHTHALMIC at 21:34

## 2024-10-16 RX ADMIN — PANTOPRAZOLE SODIUM 40 MG: 40 INJECTION, POWDER, FOR SOLUTION INTRAVENOUS at 15:12

## 2024-10-16 RX ADMIN — FLECAINIDE ACETATE 50 MG: 100 TABLET ORAL at 04:55

## 2024-10-16 RX ADMIN — ACETAMINOPHEN 650 MG: 325 TABLET ORAL at 09:09

## 2024-10-16 RX ADMIN — SODIUM CHLORIDE, PRESERVATIVE FREE 10 ML: 5 INJECTION INTRAVENOUS at 21:34

## 2024-10-16 RX ADMIN — ACETAMINOPHEN 650 MG: 325 TABLET ORAL at 21:33

## 2024-10-16 RX ADMIN — SODIUM CHLORIDE, PRESERVATIVE FREE 10 ML: 5 INJECTION INTRAVENOUS at 09:10

## 2024-10-16 RX ADMIN — TIMOLOL MALEATE 1 DROP: 5 SOLUTION OPHTHALMIC at 09:10

## 2024-10-16 RX ADMIN — LOSARTAN POTASSIUM 50 MG: 50 TABLET, FILM COATED ORAL at 09:09

## 2024-10-16 RX ADMIN — FLECAINIDE ACETATE 50 MG: 100 TABLET ORAL at 15:12

## 2024-10-16 RX ADMIN — PANTOPRAZOLE SODIUM 40 MG: 40 INJECTION, POWDER, FOR SOLUTION INTRAVENOUS at 00:14

## 2024-10-16 ASSESSMENT — PAIN SCALES - GENERAL
PAINLEVEL_OUTOF10: 0
PAINLEVEL_OUTOF10: 2
PAINLEVEL_OUTOF10: 5
PAINLEVEL_OUTOF10: 3
PAINLEVEL_OUTOF10: 0

## 2024-10-16 ASSESSMENT — PAIN DESCRIPTION - DESCRIPTORS
DESCRIPTORS: ACHING
DESCRIPTORS: ACHING

## 2024-10-16 ASSESSMENT — PAIN DESCRIPTION - LOCATION
LOCATION: SHOULDER
LOCATION: BACK

## 2024-10-16 ASSESSMENT — PAIN DESCRIPTION - ORIENTATION
ORIENTATION: RIGHT;LEFT
ORIENTATION: RIGHT

## 2024-10-16 NOTE — PROGRESS NOTES
Hospitalist Progress Note  AL Farias - NP  Answering service: [phone] OR [phone] from in house phone        Date of Service:  10/15/2024  NAME:  Elizabeth Kay  :  3/21/1930  MRN:  [id number]      Admission Summary:   Elizabeth Kay is a 89 y.o. female with history of A-fib on Xarelto, CHF, hypertension, peptic ulcer disease, history of gastric cancer status post partial gastric resection who presented to ED with complaints of rectal bleeding.  States she had been in usual state of health until earlier this evening when she noted painless rectal bleeding both with and without bowel movement.  Describes bright red blood per rectum.  Denies any previous history of GI bleeds       Interval history / Subjective:   Follow up GI bleeding, afib, renal mass  Bleeding has stopped.  Tolerating FLD    Patient with new diagnosis of kidney mass with adrenal mets. The patient initially just wanted to be left alone and did not want any workup done. She is now interested in pursuing her options. Family felt they were only ever presented with one option, hospice. Per documentation however, patient and family did not want any testing done and opted for hospice.      Assessment & Plan:     GI / Rectal Bleed in the setting of taking Xarelto (resolved)   -CT A/P: Active bleeding distal descending colon  -suspect diverticular bleeding.  No pain or tenderness  -Discussed with GI on call.  Conservative management.   -appreciate Palliative care consult.  Hospice consulted  -continue serial CBC.  Hg stable 9.1-->9.0-->8.9  -tolerating FLD.  Advance to soft diet  -continue holding xarelto    Left renal mass  Left adrenal mass  Retroperitoneal adenopathy  Moderate left pleural effusion  -Findings are concerning for metastatic cancer  -Left renal mass extending to the diaphragm may be causing pleural effusion  - urology consult

## 2024-10-16 NOTE — PLAN OF CARE
Problem: Discharge Planning  Goal: Discharge to home or other facility with appropriate resources  10/16/2024 1022 by Ximena Willams RN  Outcome: Progressing  10/16/2024 0308 by Sara Lane RN  Outcome: Progressing  Flowsheets (Taken 10/16/2024 0226)  Discharge to home or other facility with appropriate resources: Identify barriers to discharge with patient and caregiver     Problem: Skin/Tissue Integrity  Goal: Absence of new skin breakdown  Description: 1.  Monitor for areas of redness and/or skin breakdown  2.  Assess vascular access sites hourly  3.  Every 4-6 hours minimum:  Change oxygen saturation probe site  4.  Every 4-6 hours:  If on nasal continuous positive airway pressure, respiratory therapy assess nares and determine need for appliance change or resting period.  10/16/2024 1022 by Ximena Willams RN  Outcome: Progressing  10/16/2024 0308 by Sara Lane RN  Outcome: Progressing     Problem: Safety - Adult  Goal: Free from fall injury  10/16/2024 1022 by Ximena Willams RN  Outcome: Progressing  10/16/2024 0308 by Sara Lane RN  Outcome: Progressing     Problem: Pain  Goal: Verbalizes/displays adequate comfort level or baseline comfort level  10/16/2024 1022 by Ximena Willams RN  Outcome: Progressing  10/16/2024 0308 by Sara Lane RN  Outcome: Progressing

## 2024-10-16 NOTE — PLAN OF CARE
Problem: Physical Therapy - Adult  Goal: By Discharge: Performs mobility at highest level of function for planned discharge setting.  See evaluation for individualized goals.  Description: FUNCTIONAL STATUS PRIOR TO ADMISSION: Patient was modified independent using a rolling walker and single point cane for functional mobility.    HOME SUPPORT PRIOR TO ADMISSION: The patient lived with daughter who provides assistance with ADLs.    Physical Therapy Goals  Initiated 10/15/2024  1.  Patient will move from supine to sit and sit to supine in bed with modified independence within 7 day(s).    2.  Patient will perform sit to stand with independence within 7 day(s).  3.  Patient will transfer from bed to chair and chair to bed with modified independence using the least restrictive device within 7 day(s).  4.  Patient will ambulate with modified independence for 75 feet with the least restrictive device within 7 day(s).       Outcome: Progressing   PHYSICAL THERAPY TREATMENT    Patient: Elizabeth Kay (89 y.o. female)  Date: 10/16/2024  Diagnosis: GI bleed [K92.2]  Lower GI bleed [K92.2]  On rivaroxaban therapy [Z79.01] GI bleed      Precautions: Fall Risk                      ASSESSMENT:  Patient continues to benefit from skilled PT services and is slowly progressing towards goals. Patient received in bed and agreeable to work with therapy.  Moving very slowly and requiring increased time between all transitions.  Able to ambulate short distance in room with encouragement but declined further distance.  Assessed on room air and managed well for the activity she did , staying in the upper 90's.  Patient is self limiting at times..  encouraged her to use BSC vs nothing and to be up more.           PLAN:  Patient continues to benefit from skilled intervention to address the above impairments.  Continue treatment per established plan of care.    Recommend with staff: therapy recommendations for staff: Recommend mobility  with staff assist x1 using gait belt and rolling walker.      Recommendation for discharge: (in order for the patient to meet his/her long term goals):   Moderate intensity short-term skilled physical therapy up to 5x/week    Other factors to consider for discharge: patient's current support system is unable to meet their requirements for physical assistance and concern for safely navigating or managing the home environment    IF patient discharges home will need the following DME: patient owns DME required for discharge       SUBJECTIVE:   Patient stated, \"Don't rush me.\"    OBJECTIVE DATA SUMMARY:   Critical Behavior:   Alert; appropriate       Functional Mobility Training:  Bed Mobility:  Bed Mobility Training  Supine to Sit: Additional time;Adaptive equipment;Minimum assistance  Scooting: Additional time  Transfers:  Transfer Training  Sit to Stand: Minimum assistance;Adaptive equipment;Additional time  Stand to Sit: Contact-guard assistance  Balance:  Balance  Sitting: Intact  Standing: Impaired  Standing - Static: Constant support;Good  Standing - Dynamic: Constant support;Good   Ambulation/Gait Training:     Gait  Overall Level of Assistance: Contact-guard assistance  Distance (ft): 15 Feet  Assistive Device: Gait belt;Walker, rolling  Speed/[place]serrat: Pace decreased (< 100 feet/min)  Step Length: Left shortened;Right shortened  Gait Abnormalities: Decreased step clearance     Activity Tolerance:   Fair , requires frequent rest breaks, and SpO2 stable on room air    After treatment:   Patient left in no apparent distress sitting up in chair and Call bell within reach      COMMUNICATION/EDUCATION:   The patient's plan of care was discussed with: registered nurse    Patient Education  Education Given To: Patient  Education Provided: Role of Therapy;Plan of Care;Home Exercise Program  Education Method: Verbal  Barriers to Learning: None  Education Outcome: Verbalized understanding;Continued education needed

## 2024-10-16 NOTE — CARE COORDINATION
Transition of Care Plan:     RUR: 14% - Low  Prior Level of Functioning: minimal assist  Disposition: SNF  If SNF or IPR: Date FOC offered: 10/14/24  Date FOC received: 10/14/24  Accepting facility: [facility]  Date authorization started with reference number: 10/15/2024  Date authorization received and expires: 10/16/2024  Follow up appointments: PCP; Urology; Hematology Oncology  DME needed: None  Transportation at discharge: BLS  IM/IMM Medicare/ letter given: 10/11/2024  Is patient a [place] and connected with VA? No  If yes, was [place] transfer form completed and VA notified?   Caregiver Contact: Sebastian Gamez [phone]  Discharge Caregiver contacted prior to discharge? Yes  Care Conference needed? No  Barriers to discharge: Medical stability        Mame called from the [facility].  They have received authorization for Mrs. Kay to come.  The authorization starts today.  She was informed that Mrs. Kay has transferred to .  She requested the new  to call her.    Breann called from [facility].  She informed me that Mrs. Kay has authorization to transfer to [facility].  The authorization starts today.  The next review date is 10/18/2024.    The Authorization number is [id number].  The reference number is [id number].  Carlos Mcelroy is the Case manger.    The fax number for clinicals is [phone]    A message was left for the .    Will continue to follow for discharge planning.  IGOR OBRIEN LCSW

## 2024-10-16 NOTE — CARE COORDINATION
Transition of Care Plan:     RUR: 14%-Low  Prior Level of Functioning: minimal assist  Disposition: SNF  If SNF or IPR: Date FOC offered: 10/14/24  Date FOC received: 10/14/24  Accepting facility: [facility]  Date authorization started with reference number: 10/15  Date authorization received and expires: 10/16  Follow up appointments:   DME needed:   Transportation at discharge: BLS  IM/IMM Medicare/ letter given:   Is patient a [place] and connected with VA?   If yes, was [place] transfer form completed and VA notified?   Caregiver Contact:   Discharge Caregiver contacted prior to discharge?   Care Conference needed?   Barriers to discharge:   Medical stability.     [facility] contacted this cm to inform that auth is approved. Per attending, patient may be ready for discharge on 10/17.  Patient daughter, Deysi Kay [phone].    [facility]  Care Management Department  Ph:[phone]

## 2024-10-16 NOTE — CONSULTS
Requesting Provider: Zenaida Moreno MD              Patient: Elizabeth Kay MRN: [id number]  SSN: [id number]    YOB: 1930  Age: 89 y.o.  Sex: female     Location: [facility]/       Code Status: DNR   PCP: Valentina Hills MD  - [phone]   Emergency Contact:  Primary Emergency Contact: OttonielMaricruz, Home Phone: [phone]   Race/Bahai/Language: Black /  / Latter-day / Speaks English   Payor: Payor: HU[employer] MEDICARE / Plan: HU[employer]NA GOLD PLUS HMO / Product Type: *No Product type* /    Prior Admission Data: 9/29/24 [facility] EMERGENCY DEPT     Hospitalized:  Hospital Day: 7 - Admitted 10/10/2024  8:14 PM     CONSULTANTS  IP CONSULT TO GI  IP CONSULT TO CASE MANAGEMENT  IP CONSULT TO CASE MANAGEMENT  IP CONSULT TO CARDIOLOGY  IP CONSULT TO UROLOGY   ADMISSION DIAGNOSES  [unfilled]      Assessment/Plan:       89-year-old female with PMH of gastric cancer who was admitted for a GI bleed with incidental finding of a left renal mass with possible local extension to the diaphragm and retroperitoneal adenopathy as well as a left adrenal mass concerning for metastasis    - Reviewed CT findings with the patient and her daughter. The mass is not characteristic of a typical renal cell carcinoma. It appears like it may be invading the left kidney and adhered to the base of the spleen. Unclear if this is related to her hx of gastric cancer. Recommend IR CT guided biopsy of the mass for tissue diagnosis as the next step. Would recommend medical oncology consult for treatment options if this is RCC as she is a poor surgical candidate. The patient would like to review recommendations with all of her family members before making any decisions. Will follow along.      CC: [unfilled]   HPI: She is a 89 y.o. female with past medical history of A-fib on Xarelto, CHF, hypertension, history of gastric cancer status post partial gastric resection in 2016 who is admitted to the hospital for a GI bleed.  Urology  unlabored, no distress   (4) GI:  no abdominal masses, tenderness, or distension    (5) :   Voiding independently, clear yellow UA, no CVA tenderness   (6) Lymphatic:  no adenopathy, + BLE pitting edema    (7) Muscloskeletal:  no gross deformity, Normal ROM   (8) Skin:  no rash, warm, dry    (9) Neuro:  no focal deficits, Alert      Signed By: AL Brian - NP  - October 16, 2024

## 2024-10-16 NOTE — CONSULTS
[facility]  [address], [address] [place] [address]  W: [phone]  F: [phone]    Reason for Visit:   Elizabeth Kay is a 89 y.o. female who is seen today in hospital at the request of Kerry MELENDEZ for evaluation of new mass suspicious for malignancy.    Treatment History:   Gastric cancer 2016, s/p partial gastrectomy     History of Present Illness:   Ms. Elizabeth Kay is a 89yo female who presented to the ED 10/11/2024 with rectal bleeding. She has a past medical history of paroxysmal afib s/p cardioversion 9/6/24 (not on Xarelto during this hospitalization due to bleed), CHF, HTN, PUD, and gastric cancer with partial gastrectomy. On admission imaging, active bleed noted in distal descending colon with incidental finding of a left renal mass with extension to the diaphragm with retroperitoneal adenopathy with left adrenal mass. Hospice was initially consulted, which patient and family declined as they want to explore diagnostic work up and treatment options. Remote h/o gastric cancer in 2016  ED Labs 10/11: K 2.5, Hgb 9.4, CEA 1.9    I met with Ms. Kay at bedside with hospitalist NP nAnette Rodríguez. We were given permission to discuss her health with Mei Aragon over the phone. Ms. Kay states she did not pass bloody stool today, endorses abdominal distention and shortness of breath. She denies smoking, does not drink alcohol. Per Mei, Ms. Kay is up to date on her colonoscopy and mammogram.     Past Medical History:   Diagnosis Date    A-fib (HCC)     Arthritis     Cancer (HCC)     STOMACH    Hypertension     PUD (peptic ulcer disease)       Past Surgical History:   Procedure Laterality Date    HEENT Right 2013    RESTORED VISION    HYSTERECTOMY (CERVIX STATUS UNKNOWN)  1971    [place] UNLISTED PROCEDURE ABDOMEN PERITONEUM & OMENTUM  2016    REMOVED CANCER FROM STOMACH      Social History     Tobacco Use    Smoking status: Never

## 2024-10-16 NOTE — PLAN OF CARE
Bedside and Verbal shift change report given to YAMILET Bueno (oncoming nurse) by YAMILET Ruiz (offgoing nurse). Report included the following information SBAR, Kardex, ED Summary, MAR.     Problem: Discharge Planning  Goal: Discharge to home or other facility with appropriate resources  Outcome: Progressing  Flowsheets (Taken 10/16/2024 0226)  Discharge to home or other facility with appropriate resources: Identify barriers to discharge with patient and caregiver     Problem: Skin/Tissue Integrity  Goal: Absence of new skin breakdown  Description: 1.  Monitor for areas of redness and/or skin breakdown  2.  Assess vascular access sites hourly  3.  Every 4-6 hours minimum:  Change oxygen saturation probe site  4.  Every 4-6 hours:  If on nasal continuous positive airway pressure, respiratory therapy assess nares and determine need for appliance change or resting period.  Outcome: Progressing     Problem: Safety - Adult  Goal: Free from fall injury  Outcome: Progressing     Problem: Pain  Goal: Verbalizes/displays adequate comfort level or baseline comfort level  Outcome: Progressing     Problem: Physical Therapy - Adult  Goal: By Discharge: Performs mobility at highest level of function for planned discharge setting.  See evaluation for individualized goals.  Description: FUNCTIONAL STATUS PRIOR TO ADMISSION: Patient was modified independent using a rolling walker and single point cane for functional mobility.    HOME SUPPORT PRIOR TO ADMISSION: The patient lived with daughter who provides assistance with ADLs.    Physical Therapy Goals  Initiated 10/15/2024  1.  Patient will move from supine to sit and sit to supine in bed with modified independence within 7 day(s).    2.  Patient will perform sit to stand with independence within 7 day(s).  3.  Patient will transfer from bed to chair and chair to bed with modified independence using the least restrictive device within 7 day(s).  4.  Patient will ambulate with modified  independence for 75 feet with the least restrictive device within 7 day(s).       10/15/2024 1323 by Sara Pelayo, PT  Outcome: Progressing

## 2024-10-16 NOTE — PROGRESS NOTES
Occupational Therapy: Defer     Chart reviewed up to date. Attempted to see pt for OT treatment. Pt requesting to rest in chair, and asking therapy to come back in  a \"couple of hours\". Will defer for now and attempt back later.     Thank you,  Apurva Shea OTD, OTR/L

## 2024-10-17 ENCOUNTER — APPOINTMENT (OUTPATIENT)
Facility: HOSPITAL | Age: 89
DRG: 378 | End: 2024-10-17
Payer: MEDICARE

## 2024-10-17 PROBLEM — R19.02 ABDOMINAL MASS, LEFT UPPER QUADRANT: Status: ACTIVE | Noted: 2024-10-17

## 2024-10-17 PROCEDURE — 6360000002 HC RX W HCPCS: Performed by: NURSE PRACTITIONER

## 2024-10-17 PROCEDURE — 6370000000 HC RX 637 (ALT 250 FOR IP): Performed by: STUDENT IN AN ORGANIZED HEALTH CARE EDUCATION/TRAINING PROGRAM

## 2024-10-17 PROCEDURE — 88305 TISSUE EXAM BY PATHOLOGIST: CPT

## 2024-10-17 PROCEDURE — 88333 PATH CONSLTJ SURG CYTO XM 1: CPT

## 2024-10-17 PROCEDURE — 94760 N-INVAS EAR/PLS OXIMETRY 1: CPT

## 2024-10-17 PROCEDURE — 99152 MOD SED SAME PHYS/QHP 5/>YRS: CPT

## 2024-10-17 PROCEDURE — 88341 IMHCHEM/IMCYTCHM EA ADD ANTB: CPT

## 2024-10-17 PROCEDURE — 88342 IMHCHEM/IMCYTCHM 1ST ANTB: CPT

## 2024-10-17 PROCEDURE — 2700000000 HC OXYGEN THERAPY PER DAY

## 2024-10-17 PROCEDURE — 2580000003 HC RX 258: Performed by: STUDENT IN AN ORGANIZED HEALTH CARE EDUCATION/TRAINING PROGRAM

## 2024-10-17 PROCEDURE — 6370000000 HC RX 637 (ALT 250 FOR IP): Performed by: INTERNAL MEDICINE

## 2024-10-17 PROCEDURE — 1100000000 HC RM PRIVATE

## 2024-10-17 PROCEDURE — 88360 TUMOR IMMUNOHISTOCHEM/MANUAL: CPT

## 2024-10-17 PROCEDURE — 2580000003 HC RX 258: Performed by: NURSE PRACTITIONER

## 2024-10-17 PROCEDURE — 6360000002 HC RX W HCPCS

## 2024-10-17 RX ORDER — FENTANYL CITRATE 50 UG/ML
INJECTION, SOLUTION INTRAMUSCULAR; INTRAVENOUS PRN
Status: COMPLETED | OUTPATIENT
Start: 2024-10-17 | End: 2024-10-17

## 2024-10-17 RX ORDER — FUROSEMIDE 10 MG/ML
40 INJECTION INTRAMUSCULAR; INTRAVENOUS ONCE
Status: COMPLETED | OUTPATIENT
Start: 2024-10-17 | End: 2024-10-17

## 2024-10-17 RX ORDER — FUROSEMIDE 40 MG/1
20 TABLET ORAL DAILY
Qty: 60 TABLET | Refills: 3 | Status: SHIPPED | OUTPATIENT
Start: 2024-10-17

## 2024-10-17 RX ORDER — HYDRALAZINE HYDROCHLORIDE 20 MG/ML
10 INJECTION INTRAMUSCULAR; INTRAVENOUS EVERY 6 HOURS PRN
Status: DISCONTINUED | OUTPATIENT
Start: 2024-10-17 | End: 2024-10-18 | Stop reason: HOSPADM

## 2024-10-17 RX ORDER — MIDAZOLAM HYDROCHLORIDE 5 MG/ML
INJECTION, SOLUTION INTRAMUSCULAR; INTRAVENOUS PRN
Status: COMPLETED | OUTPATIENT
Start: 2024-10-17 | End: 2024-10-17

## 2024-10-17 RX ADMIN — SODIUM CHLORIDE, PRESERVATIVE FREE 10 ML: 5 INJECTION INTRAVENOUS at 20:38

## 2024-10-17 RX ADMIN — PANTOPRAZOLE SODIUM 40 MG: 40 INJECTION, POWDER, FOR SOLUTION INTRAVENOUS at 02:40

## 2024-10-17 RX ADMIN — FENTANYL CITRATE 25 MCG: 0.05 INJECTION, SOLUTION INTRAMUSCULAR; INTRAVENOUS at 13:50

## 2024-10-17 RX ADMIN — FUROSEMIDE 40 MG: 10 INJECTION, SOLUTION INTRAMUSCULAR; INTRAVENOUS at 15:24

## 2024-10-17 RX ADMIN — MIDAZOLAM 0.5 MG: 5 INJECTION INTRAMUSCULAR; INTRAVENOUS at 12:59

## 2024-10-17 RX ADMIN — FENTANYL CITRATE 25 MCG: 0.05 INJECTION, SOLUTION INTRAMUSCULAR; INTRAVENOUS at 12:51

## 2024-10-17 RX ADMIN — ACETAMINOPHEN 650 MG: 325 TABLET ORAL at 20:35

## 2024-10-17 RX ADMIN — SODIUM CHLORIDE, PRESERVATIVE FREE 10 ML: 5 INJECTION INTRAVENOUS at 08:43

## 2024-10-17 RX ADMIN — MIDAZOLAM 1 MG: 5 INJECTION INTRAMUSCULAR; INTRAVENOUS at 13:02

## 2024-10-17 RX ADMIN — MIDAZOLAM 0.5 MG: 5 INJECTION INTRAMUSCULAR; INTRAVENOUS at 13:08

## 2024-10-17 RX ADMIN — TIMOLOL MALEATE 1 DROP: 5 SOLUTION OPHTHALMIC at 20:37

## 2024-10-17 RX ADMIN — PANTOPRAZOLE SODIUM 40 MG: 40 INJECTION, POWDER, FOR SOLUTION INTRAVENOUS at 15:13

## 2024-10-17 RX ADMIN — FLECAINIDE ACETATE 50 MG: 100 TABLET ORAL at 15:37

## 2024-10-17 RX ADMIN — FLECAINIDE ACETATE 50 MG: 100 TABLET ORAL at 02:39

## 2024-10-17 RX ADMIN — TIMOLOL MALEATE 1 DROP: 5 SOLUTION OPHTHALMIC at 08:42

## 2024-10-17 RX ADMIN — LOSARTAN POTASSIUM 50 MG: 50 TABLET, FILM COATED ORAL at 08:42

## 2024-10-17 RX ADMIN — FENTANYL CITRATE 25 MCG: 0.05 INJECTION, SOLUTION INTRAMUSCULAR; INTRAVENOUS at 12:59

## 2024-10-17 RX ADMIN — HYDRALAZINE HYDROCHLORIDE 10 MG: 20 INJECTION INTRAMUSCULAR; INTRAVENOUS at 15:59

## 2024-10-17 RX ADMIN — SODIUM CHLORIDE, PRESERVATIVE FREE 10 ML: 5 INJECTION INTRAVENOUS at 20:37

## 2024-10-17 ASSESSMENT — PAIN - FUNCTIONAL ASSESSMENT
PAIN_FUNCTIONAL_ASSESSMENT: NONE - DENIES PAIN

## 2024-10-17 ASSESSMENT — PAIN SCALES - GENERAL
PAINLEVEL_OUTOF10: 2
PAINLEVEL_OUTOF10: 5

## 2024-10-17 ASSESSMENT — PAIN DESCRIPTION - DESCRIPTORS: DESCRIPTORS: ACHING

## 2024-10-17 ASSESSMENT — PAIN SCALES - WONG BAKER: WONGBAKER_NUMERICALRESPONSE: NO HURT

## 2024-10-17 ASSESSMENT — PAIN DESCRIPTION - ORIENTATION: ORIENTATION: RIGHT

## 2024-10-17 ASSESSMENT — PAIN DESCRIPTION - LOCATION: LOCATION: SHOULDER

## 2024-10-17 NOTE — PROGRESS NOTES
0930- Pt received for renal mass biopsy. Workup completed. Consent obtained per daughter Mei Aragon.     1330- Pt in recovery. VSS.    1400-TRANSFER - OUT REPORT:    Verbal report given to Hailey on Elizabeth Kay  being transferred to [facility] for routine progression of patient care       Report consisted of patient's Situation, Background, Assessment and   Recommendations(SBAR).     Information from the following report(s) Nurse Handoff Report, Intake/Output, Cardiac Rhythm SR, Quality Measures, and Neuro Assessment was reviewed with the receiving nurse.           Lines:   Peripheral IV 10/11/24 Right;Dorsal Forearm (Active)   Site Assessment Clean, dry & intact 10/17/24 1008   Line Status Flushed 10/17/24 1008   Line Care Connections checked and tightened;Cap changed 10/17/24 1008   Phlebitis Assessment No symptoms 10/17/24 1008   Infiltration Assessment 0 10/17/24 1008   Alcohol Cap Used Yes 10/17/24 1008   Dressing Status Clean, dry & intact 10/17/24 1008   Dressing Type Transparent 10/17/24 1008        Opportunity for questions and clarification was provided.      Patient transported with:  O2 @ 2lpm

## 2024-10-17 NOTE — CARE COORDINATION
Transition of Care Plan to SNF/Rehab    Communication to Patient/Family:  Met with patient and family and they are agreeable to the transition plan. The Plan for Transition of Care is related to the following treatment goals: SNF    The Patient and/or patient representative was provided with a choice of provider and agrees  with the discharge plan.      Yes [x] No []    A Freedom of choice list was provided with basic dialogue that supports the patient's individualized plan of care/goals and shares the quality data associated with the providers.       Yes [x] No []    SNF/Rehab Transition:  Patient has been accepted to [facility]  SNF/Rehab and meets criteria for admission.   Patient will transported by [facility] and expected to leave at 1400.    Communication to SNF/Rehab:  Bedside RN, Hailey, has been notified to update the transition plan to the facility and call report [phone] Room 102.  Discharge information has been updated on the AVS. And communicated to facility via [employer], or CC link.     Discharge instructions to be fax'd to facility at (Fax #). Uploaded in EPIC.      Nursing Please include all hard scripts for controlled substances, med rec and dc summary, and AVS in packet.     Reviewed and confirmed with facility, [facility] , can manage the patient care needs for the following:     Raúl with (X) only those applicable:  Medication:  [x]Medications are available at the facility  []IV Antibiotics    []Controlled Substance - hard copies available sent.  []Weekly Labs    Equipment:  []CPAP/BiPAP  []Wound Vacuum  []Smith or Urinary Device  []PICC/Central Line  []Nebulizer  []Ventilator    Treatment:  []Isolation (for MRSA, VRE, etc.)  []Surgical Drain Management  []Tracheostomy Care  []Dressing Changes  []Dialysis with transportation  []PEG Care  []Oxygen  []Daily Weights for Heart Failure    Dietary:  []Any diet limitations  []Tube Feedings   []Total Parenteral Management (TPN)

## 2024-10-17 NOTE — PROGRESS NOTES
Hospitalist Progress Note  AL Farias - NP  Answering service: [phone] OR [phone] from in house phone        Date of Service:  10/16/2024  NAME:  Elizabeth Kay  :  3/21/1930  MRN:  [id number]      Admission Summary:   Elizabeth Kay is a 89 y.o. female with history of A-fib on Xarelto, CHF, hypertension, peptic ulcer disease, history of gastric cancer status post partial gastric resection who presented to ED with complaints of rectal bleeding.  States she had been in usual state of health until earlier this evening when she noted painless rectal bleeding both with and without bowel movement.  Describes bright red blood per rectum.  Denies any previous history of GI bleeds       Interval history / Subjective:     Patient seen and examined. Doing well today. No further bleeding with stool. Hemoglobin is stable. Patient ready and willing to move forward with CT guided biopsy. She also has insurance auth for rehab. Hopefully will get biopsy tomorrow and then can discharge to SNF.      Assessment & Plan:     GI / Rectal Bleed in the setting of taking Xarelto (resolved)   -CT A/P: Active bleeding distal descending colon  -suspect diverticular bleeding.  No pain or tenderness  -Discussed with GI on call.  Conservative management.   -appreciate Palliative care consult.  Hospice consulted  -continue serial CBC.  Hg stable 9.1-->9.0-->8.9  -tolerating FLD.  Advance to soft diet  -continue holding xarelto    Left renal mass  Left adrenal mass  Retroperitoneal adenopathy  Moderate left pleural effusion  -Findings are concerning for metastatic cancer  -Left renal mass extending to the diaphragm may be causing pleural effusion  - urology consult placed , patient would like to know any possible treatment options     Atrial fibrillation  -Continue PTA flecainide  -continue holding Xarelto.     Oral Q8H PRN    Or    ondansetron (ZOFRAN) injection 4 mg  4 mg IntraVENous Q6H PRN    polyethylene glycol (GLYCOLAX) packet 17 g  17 g Oral Daily PRN    acetaminophen (TYLENOL) tablet 650 mg  650 mg Oral Q6H PRN    Or    acetaminophen (TYLENOL) suppository 650 mg  650 mg Rectal Q6H PRN    pantoprazole (PROTONIX) 40 mg in sodium chloride (PF) 0.9 % 10 mL injection  40 mg IntraVENous Q12H     ______________________________________________________________________  EXPECTED LENGTH OF STAY: Unable to retrieve estimated LOS  ACTUAL LENGTH OF STAY:          5                 AL Farias - NP

## 2024-10-17 NOTE — PROGRESS NOTES
INTERVENTIONAL RADIOLOGY  Preoperative History and Physical      Patient:  Elizabeth Kay  :  3/21/1930  Age:  89 y.o.  MRN:  [id number]  Today's Date:  10/17/2024      CC / HPI   Elizabeth Kay is a 89 y.o. female with a history of left renal/adrenal mass who presents for CT Guided Left Renal/Adrenal Mass Biopsy.    PAST MEDICAL HISTORY  Past Medical History:   Diagnosis Date    A-fib (HCC)     Arthritis     Cancer (HCC)     STOMACH    Hypertension     PUD (peptic ulcer disease)        PAST SURGICAL HISTORY  Past Surgical History:   Procedure Laterality Date    HEENT Right     RESTORED VISION    HYSTERECTOMY (CERVIX STATUS UNKNOWN)      [place] UNLISTED PROCEDURE ABDOMEN PERITONEUM & OMENTUM  2016    REMOVED CANCER FROM STOMACH       SOCIAL HISTORY  Social History     Socioeconomic History    Marital status:      Spouse name: Not on file    Number of children: Not on file    Years of education: Not on file    Highest education level: Not on file   Occupational History    Not on file   Tobacco Use    Smoking status: Never    Smokeless tobacco: Never   Substance and Sexual Activity    Alcohol use: Not Currently    Drug use: Not Currently    Sexual activity: Not on file   Other Topics Concern    Not on file   Social History Narrative    Not on file     Social Determinants of Health     Financial Resource Strain: Not on file   Food Insecurity: No Food Insecurity (10/11/2024)    Hunger Vital Sign     Worried About Running Out of Food in the Last Year: Never true     Ran Out of Food in the Last Year: Never true   Transportation Needs: No Transportation Needs (10/11/2024)    PRAPARE - Transportation     Lack of Transportation (Medical): No     Lack of Transportation (Non-Medical): No   Physical Activity: Not on file   Stress: Not on file   Social Connections: Not on file   Intimate Partner Violence: Not on file   Housing Stability: Low Risk  (10/11/2024)    Housing Stability Vital Sign     Unable to Pay

## 2024-10-17 NOTE — CARE COORDINATION
ABDULAZIZ:    Patient still in IR, transport changed from 2pm to 3pm.    1450- Patient remains in IR. Transport is changed from 3pm to 4pm.     Patient not stable for d/c, cm rescheduled transport for Friday at 10/18 at 2pm. [place] notified, she will transport at that time and go to room 102. Packet on file, and IMM letter completed 10/17.    [facility]  [facility] Department  Ph:[phone]

## 2024-10-17 NOTE — PROGRESS NOTES
Update note:     Patient returned from biopsy with elevated blood pressures around 330 pm, as high as 190 systolic. Hydralazine IV 10 mg ordered. Will monitor pt through the night and cancel discharge. Plan to DC to facility in the AM if she remains medically stable.    ABHIJIT Rodríguez, ROCIOP  [facility] Hospitalist

## 2024-10-17 NOTE — DISCHARGE SUMMARY
Family Medicine Follow up in 1 week(s)  [address]  Kalpesh B  [facility] [address] [phone]      [facility] ([facility]) Skilled Nursing Facility Follow up  [address] [facility] [address] [phone]              ADDITIONAL CARE RECOMMENDATIONS:     Resume medications as indicated in the medication reconciliation section of this document  Continue taking 20 mg of Lasix daily, if shortness of breath worsens, recommend repeating a chest XR to evaluate for worsening pleural effusions.   Follow up with hematology/oncology in the next 1-2 weeks to discuss biopsy results and next steps   Stop taking Xarelto in the setting of active GI bleed   Check labs ( CBC/CMP) on Monday to ensure stability     DIET: regular diet  Oral Nutritional Supplements: high protein/high calorie supplement twice daily    ACTIVITY: activity as tolerated    WOUND CARE: none    EQUIPMENT needed: walker for ambulation     DISCHARGE MEDICATIONS:     Medication List        CONTINUE taking these medications      acetaminophen 325 MG tablet  Commonly known as: TYLENOL     flecainide 50 MG tablet  Commonly known as: TAMBOCOR     furosemide 40 MG tablet  Commonly known as: LASIX     losartan 50 MG tablet  Commonly known as: COZAAR     timolol 0.5 % ophthalmic solution  Commonly known as: TIMOPTIC            STOP taking these medications      potassium chloride 10 MEQ extended release capsule  Commonly known as: MICRO-K     pravastatin 40 MG tablet  Commonly known as: PRAVACHOL     rivaroxaban 20 MG Tabs tablet  Commonly known as: XARELTO                NOTIFY YOUR PHYSICIAN FOR ANY OF THE FOLLOWING:   Fever over 101 degrees for 24 hours.   Chest pain, shortness of breath, fever, chills, nausea, vomiting, diarrhea, change in mentation, falling, weakness, bleeding. Severe pain or pain not relieved by medications.  Or, any other signs or symptoms that you may have questions about.    DISPOSITION:    Home With:   OT  PT   HH  RN      X Long term SNF/Inpatient Rehab    Independent/assisted living    Hospice    Other:       PATIENT CONDITION AT DISCHARGE:     Functional status    Poor    X Deconditioned     Independent      Cognition    X Lucid     Forgetful     Dementia      Catheters/lines (plus indication)    Smith     PICC     PEG    X  None      Code status     Full code    X DNR      PHYSICAL EXAMINATION AT DISCHARGE:    General : alert x 3, awake, no acute distress,   HEENT: PEERL, EOMI, moist mucus membrane  Neck: supple, no JVD, no meningeal signs  Chest: Clear to auscultation bilaterally   CVS: S1 S2 heard, Capillary refill less than 2 seconds  Abd: soft/ Non tender, non distended, BS physiological,   Ext: no clubbing, no cyanosis, no edema, brisk 2+ DP pulses  Neuro/Psych: pleasant mood and affect, CN 2-12 grossly intact, sensory grossly within normal limit, Strength 5/5 in all extremities  Skin: warm     CHRONIC MEDICAL DIAGNOSES:  Principal Problem:    Lower GI bleed  Active Problems:    Abdominal mass, left upper quadrant  Resolved Problems:    * No resolved hospital problems. *        Greater than 31 minutes were spent with the patient on counseling and coordination of care    Signed:   AL Farias NP  10/17/2024  12:31 PM

## 2024-10-18 VITALS
DIASTOLIC BLOOD PRESSURE: 65 MMHG | HEIGHT: 59 IN | BODY MASS INDEX: 31.07 KG/M2 | SYSTOLIC BLOOD PRESSURE: 129 MMHG | WEIGHT: 154.1 LBS | HEART RATE: 71 BPM | OXYGEN SATURATION: 99 % | TEMPERATURE: 98 F | RESPIRATION RATE: 20 BRPM

## 2024-10-18 PROCEDURE — 2580000003 HC RX 258: Performed by: NURSE PRACTITIONER

## 2024-10-18 PROCEDURE — 94760 N-INVAS EAR/PLS OXIMETRY 1: CPT

## 2024-10-18 PROCEDURE — 6370000000 HC RX 637 (ALT 250 FOR IP): Performed by: INTERNAL MEDICINE

## 2024-10-18 PROCEDURE — 6370000000 HC RX 637 (ALT 250 FOR IP): Performed by: NURSE PRACTITIONER

## 2024-10-18 PROCEDURE — 6360000002 HC RX W HCPCS: Performed by: NURSE PRACTITIONER

## 2024-10-18 PROCEDURE — 2580000003 HC RX 258: Performed by: STUDENT IN AN ORGANIZED HEALTH CARE EDUCATION/TRAINING PROGRAM

## 2024-10-18 PROCEDURE — 2700000000 HC OXYGEN THERAPY PER DAY

## 2024-10-18 RX ORDER — PANTOPRAZOLE SODIUM 40 MG/1
40 TABLET, DELAYED RELEASE ORAL
Status: DISCONTINUED | OUTPATIENT
Start: 2024-10-18 | End: 2024-10-18 | Stop reason: HOSPADM

## 2024-10-18 RX ADMIN — SODIUM CHLORIDE, PRESERVATIVE FREE 10 ML: 5 INJECTION INTRAVENOUS at 09:11

## 2024-10-18 RX ADMIN — PANTOPRAZOLE SODIUM 40 MG: 40 TABLET, DELAYED RELEASE ORAL at 09:07

## 2024-10-18 RX ADMIN — PANTOPRAZOLE SODIUM 40 MG: 40 INJECTION, POWDER, FOR SOLUTION INTRAVENOUS at 01:11

## 2024-10-18 RX ADMIN — LOSARTAN POTASSIUM 50 MG: 50 TABLET, FILM COATED ORAL at 09:06

## 2024-10-18 RX ADMIN — TIMOLOL MALEATE 1 DROP: 5 SOLUTION OPHTHALMIC at 09:06

## 2024-10-18 NOTE — PROGRESS NOTES
1245: called Babar tyler [place] to provide report   RN no available   Will attempt later     1415:  Called to provide report   Niyah WOODARD accepted report  Patient en route to facility

## 2024-10-18 NOTE — CARE COORDINATION
Transition of Care Plan: d/c to the [facility], CALL REPORT# [phone], Room 112    [facility] transport scheduled for 1:37pm    RUR: 12%  Prior Level of Functioning: Patient was modified independent using a rolling walker and single point cane for functional mobility.   Disposition: the [facility] SNF  If SNF or IPR: Date FOC offered: 10/14/24  Date FOC received: 10/14/24  Accepting facility: [facility]  Date authorization started with reference number: 10/15  Date authorization received and expires:   Follow up appointments: PCP & Specialist as directed  DME needed: defer to SNF  Transportation at discharge: BLS  IM/IMM Medicare/Katy letter given: 10/17/24  Caregiver Contact:   Discharge Caregiver contacted prior to discharge?   Care Conference needed?   Barriers to discharge:   None  1000-CM met with pt at bedside and she is agreeable to d/c plan to the [facility] today. Nurse informed of d/c plan as well.  Erika Griffin RN BSN CCM

## 2024-10-18 NOTE — PROGRESS NOTES
Clinical Pharmacy Note: IV to PO Automatic Conversion  Please note: Elizabeth Kay’s medication(s) (pantoprazole) has/have been changed from IV to PO based on the following critiera:    Patient is tolerating oral medications  Patient is tolerating a diet more advanced than clear liquids  Patient is not requiring vasopressors    This IV to PO conversion is based on the P&T approved automatic conversion policy for eligible patients.  Please call with questions.

## 2024-10-22 ENCOUNTER — TELEPHONE (OUTPATIENT)
Age: 89
End: 2024-10-22

## 2024-10-22 NOTE — TELEPHONE ENCOUNTER
Left vm on pt's phone to call office back. Phoned Mei per provider hospital encounter and advised of appt scheduled tentatively for 10/29/2024 at 9:30 per team. Mei stated she would call back to confirm appt would work as pt is currently inpatient at [facility].

## 2024-10-24 NOTE — DISCHARGE SUMMARY
Discharge Summary       PATIENT ID: Elizabeth Kay  MRN: [id number]   YOB: 1930    DATE OF ADMISSION: 10/10/2024  8:14 PM    DATE OF DISCHARGE: 10/17/2024  PRIMARY CARE PROVIDER: Valentina Hills MD     ATTENDING PHYSICIAN: Tiffanie  DISCHARGING PROVIDER: AL Farias NP    To contact this individual call [phone] and ask the  to page.  If unavailable ask to be transferred the Adult Hospitalist Department.    CONSULTATIONS: IP CONSULT TO GI  IP CONSULT TO CASE MANAGEMENT  IP CONSULT TO CASE MANAGEMENT  IP CONSULT TO UROLOGY  IP CONSULT TO HEMATOLOGY    PROCEDURES/SURGERIES: * No surgery found *     ADMITTING DIAGNOSES & HOSPITAL COURSE:     Elizabeth Kay is a 89 y.o. female with history of A-fib on Xarelto, CHF, hypertension, peptic ulcer disease, history of gastric cancer status post partial gastric resection who presented to ED with complaints of rectal bleeding.  States she had been in usual state of health until earlier this evening when she noted painless rectal bleeding both with and without bowel movement.  Describes bright red blood per rectum.  Denies any previous history of GI bleeds. The patients Xarelto was stopped during this admission and should not be resumed due to risk of GI bleeding. Her bleeding subsided and hemoglobin stabilized without the need for blood transfusion.    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 This is likely the cause for her shortness of breath and abdominal pain which she reports that she has been experiencing for 6 months. Initially, the patient was sure she wanted any additional workup or treatment, however she decided that she would like to at least consider her options. She underwent a CT guided biopsy of the mass on 10/17. She met with [facility] while admitted, Dr. Haas, who will meet again with her outpatient to discuss

## 2024-10-25 ENCOUNTER — TELEPHONE (OUTPATIENT)
Age: 89
End: 2024-10-25

## 2024-10-25 NOTE — TELEPHONE ENCOUNTER
LVM. Returned daughter's (Mei) voicemail about confirmation of mother's appt on 10/29. Made Mei aware that patient's appt is scheduled for 10/29 and provided the clinic's location. Instructed daughter to call office if she had any further questions.    #[phone]

## 2024-10-25 NOTE — PROGRESS NOTES
[facility] [place] at [facility]  [address], [address] [place] [address]  W: [phone]  F: [phone]    Reason for Visit:   Elizabeth Kay is a 89 y.o. female who is seen today as a new patient for renal mass - B cell Lymphoma     Treatment History:   Gastric cancer 2016, s/p partial gastrectomy     History of Present Illness:   Ms. Elizabeth Kay is a 89yo female who presented to the ED 10/11/2024 with rectal bleeding. She has a past medical history of paroxysmal afib s/p cardioversion 9/6/24 (not on Xarelto during this hospitalization due to bleed), CHF, HTN, PUD, and gastric cancer with partial gastrectomy. On admission imaging, active bleed noted in distal descending colon with incidental finding of a left renal mass with extension to the diaphragm with retroperitoneal adenopathy with left adrenal mass. Hospice was initially consulted, which patient and family declined as they want to explore diagnostic work up and treatment options. Remote h/o gastric cancer in 2016 ED Labs 10/11: K 2.5, Hgb 9.4, CEA 1.9. Underwent a renal mass biopsy 10/17/2024 and comes to review management.     I met with Ms. Kay at bedside with hospitalist NP Annette Rodríguez. We were given permission to discuss her health with Mei Aragon over the phone. Ms. Kay states she did not pass bloody stool today, endorses abdominal distention and shortness of breath. She denies smoking, does not drink alcohol. Per Mei, Ms. Kay is up to date on her colonoscopy and mammogram.     Past Medical History:   Diagnosis Date    A-fib (HCC)     Arthritis     Cancer (HCC)     STOMACH    Hypertension     PUD (peptic ulcer disease)       Past Surgical History:   Procedure Laterality Date    HEENT Right 2013    RESTORED VISION    HYSTERECTOMY (CERVIX STATUS UNKNOWN)  1971    MS UNLISTED PROCEDURE ABDOMEN PERITONEUM & OMENTUM  2016    REMOVED CANCER FROM STOMACH      Social History     Tobacco Use    Smoking

## 2024-10-29 ENCOUNTER — OFFICE VISIT (OUTPATIENT)
Age: 89
End: 2024-10-29
Payer: MEDICARE

## 2024-10-29 VITALS
OXYGEN SATURATION: 100 % | TEMPERATURE: 98.1 F | HEART RATE: 70 BPM | SYSTOLIC BLOOD PRESSURE: 133 MMHG | RESPIRATION RATE: 16 BRPM | DIASTOLIC BLOOD PRESSURE: 82 MMHG

## 2024-10-29 DIAGNOSIS — C82.90 FOLLICULAR LYMPHOMA, UNSPECIFIED GRADE, UNSPECIFIED BODY REGION (HCC): ICD-10-CM

## 2024-10-29 DIAGNOSIS — K92.2 LOWER GI BLEED: ICD-10-CM

## 2024-10-29 DIAGNOSIS — C82.90 FOLLICULAR LYMPHOMA, UNSPECIFIED GRADE, UNSPECIFIED BODY REGION (HCC): Primary | ICD-10-CM

## 2024-10-29 LAB
BASOPHILS # BLD AUTO: 0 X10E3/UL (ref 0–0.2)
BASOPHILS NFR BLD AUTO: 0 %
EOSINOPHIL # BLD AUTO: 0 X10E3/UL (ref 0–0.4)
EOSINOPHIL NFR BLD AUTO: 1 %
ERYTHROCYTE [DISTWIDTH] IN BLOOD BY AUTOMATED COUNT: 15.8 % (ref 11.7–15.4)
HCT VFR BLD AUTO: 35.5 % (ref 34–46.6)
HGB BLD-MCNC: 11 G/DL (ref 11.1–15.9)
IMM GRANULOCYTES # BLD AUTO: 0 X10E3/UL (ref 0–0.1)
IMM GRANULOCYTES NFR BLD AUTO: 1 %
LYMPHOCYTES # BLD AUTO: 0.4 X10E3/UL (ref 0.7–3.1)
LYMPHOCYTES NFR BLD AUTO: 5 %
MCH RBC QN AUTO: 27.2 PG (ref 26.6–33)
MCHC RBC AUTO-ENTMCNC: 31 G/DL (ref 31.5–35.7)
MCV RBC AUTO: 88 FL (ref 79–97)
MONOCYTES # BLD AUTO: 0.8 X10E3/UL (ref 0.1–0.9)
MONOCYTES NFR BLD AUTO: 11 %
NEUTROPHILS # BLD AUTO: 5.5 X10E3/UL (ref 1.4–7)
NEUTROPHILS NFR BLD AUTO: 82 %
PLATELET # BLD AUTO: 276 X10E3/UL (ref 150–450)
RBC # BLD AUTO: 4.04 X10E6/UL (ref 3.77–5.28)
WBC # BLD AUTO: 6.7 X10E3/UL (ref 3.4–10.8)

## 2024-10-29 PROCEDURE — 1090F PRES/ABSN URINE INCON ASSESS: CPT | Performed by: INTERNAL MEDICINE

## 2024-10-29 PROCEDURE — 1036F TOBACCO NON-USER: CPT | Performed by: INTERNAL MEDICINE

## 2024-10-29 PROCEDURE — 1111F DSCHRG MED/CURRENT MED MERGE: CPT | Performed by: INTERNAL MEDICINE

## 2024-10-29 PROCEDURE — G8417 CALC BMI ABV UP PARAM F/U: HCPCS | Performed by: INTERNAL MEDICINE

## 2024-10-29 PROCEDURE — G8484 FLU IMMUNIZE NO ADMIN: HCPCS | Performed by: INTERNAL MEDICINE

## 2024-10-29 PROCEDURE — 99215 OFFICE O/P EST HI 40 MIN: CPT | Performed by: INTERNAL MEDICINE

## 2024-10-29 PROCEDURE — 1125F AMNT PAIN NOTED PAIN PRSNT: CPT | Performed by: INTERNAL MEDICINE

## 2024-10-29 PROCEDURE — 1123F ACP DISCUSS/DSCN MKR DOCD: CPT | Performed by: INTERNAL MEDICINE

## 2024-10-29 PROCEDURE — G8428 CUR MEDS NOT DOCUMENT: HCPCS | Performed by: INTERNAL MEDICINE

## 2024-10-29 NOTE — PROGRESS NOTES
Elizabeth Kay is a 89 y.o. female    Chief Complaint   Patient presents with    Follow-up     new mass suspicious for malignancy.       1. Have you been to the ER, urgent care clinic since your last visit?  Hospitalized since your last visit? Yes, [facility]     2. Have you seen or consulted any other health care providers outside of the [facility] since your last visit?  Include any pap smears or colon screening. No

## 2024-10-29 NOTE — PROGRESS NOTES
[facility] [place] at [facility]  [address], [address] [place] [address]  W: [phone]  F: [phone]    Reason for Visit:   Elizabeth Kay is a 89 y.o. female who is seen today as a new patient for renal mass - B cell Lymphoma     Treatment History:   Gastric cancer 2016, s/p partial gastrectomy     History of Present Illness:   Ms. Elizabeth Kay is a 89yo female who presented to the ED 10/11/2024 with rectal bleeding. She has a past medical history of paroxysmal afib s/p cardioversion 9/6/24 (not on Xarelto during this hospitalization due to bleed), CHF, HTN, PUD, and gastric cancer with partial gastrectomy. On admission imaging, active bleed noted in distal descending colon with incidental finding of a left renal mass with extension to the diaphragm with retroperitoneal adenopathy with left adrenal mass. Hospice was initially consulted, which patient and family declined as they want to explore diagnostic work up and treatment options. Remote h/o gastric cancer in 2016 ED Labs 10/11: K 2.5, Hgb 9.4, CEA 1.9. Underwent a renal mass biopsy 10/17/2024 and comes to review management.     She comes with her daughters Maricruz and Ximena. She is on O2 - 3L continuous. Has a bed sore . No bloody stools.  She has not been moving. She was active prior to recent admission, poor appetite. No new pain.       Past Medical History:   Diagnosis Date    A-fib (HCC)     Arthritis     Cancer (HCC)     STOMACH    Hypertension     PUD (peptic ulcer disease)       Past Surgical History:   Procedure Laterality Date    HEENT Right 2013    RESTORED VISION    HYSTERECTOMY (CERVIX STATUS UNKNOWN)  1971    MN UNLISTED PROCEDURE ABDOMEN PERITONEUM & OMENTUM  2016    REMOVED CANCER FROM STOMACH      Social History     Tobacco Use    Smoking status: Never    Smokeless tobacco: Never   Substance Use Topics    Alcohol use: Not Currently      Family History   Problem Relation Age of Onset    Diabetes Son

## 2024-10-30 ENCOUNTER — APPOINTMENT (OUTPATIENT)
Facility: HOSPITAL | Age: 89
DRG: 312 | End: 2024-10-30
Payer: MEDICARE

## 2024-10-30 ENCOUNTER — HOSPITAL ENCOUNTER (INPATIENT)
Facility: HOSPITAL | Age: 89
LOS: 1 days | Discharge: HOME | DRG: 312 | End: 2024-11-01
Attending: EMERGENCY MEDICINE | Admitting: FAMILY MEDICINE
Payer: MEDICARE

## 2024-10-30 DIAGNOSIS — R55 SYNCOPE, UNSPECIFIED SYNCOPE TYPE: Primary | ICD-10-CM

## 2024-10-30 DIAGNOSIS — R94.31 PROLONGED Q-T INTERVAL ON ECG: ICD-10-CM

## 2024-10-30 DIAGNOSIS — R41.82 ALTERED MENTAL STATUS, UNSPECIFIED ALTERED MENTAL STATUS TYPE: ICD-10-CM

## 2024-10-30 LAB
ABO + RH BLD: NORMAL
ANION GAP SERPL CALC-SCNC: 7 MMOL/L (ref 2–12)
APPEARANCE UR: CLEAR
BACTERIA URNS QL MICRO: NEGATIVE /HPF
BASOPHILS # BLD: 0 K/UL (ref 0–0.1)
BASOPHILS NFR BLD: 0 % (ref 0–1)
BILIRUB UR QL: NEGATIVE
BLOOD GROUP ANTIBODIES SERPL: NORMAL
BUN SERPL-MCNC: 8 MG/DL (ref 6–20)
BUN/CREAT SERPL: 12 (ref 12–20)
CALCIUM SERPL-MCNC: 8.7 MG/DL (ref 8.5–10.1)
CHLORIDE SERPL-SCNC: 90 MMOL/L (ref 97–108)
CO2 SERPL-SCNC: 33 MMOL/L (ref 21–32)
COLOR UR: NORMAL
COMMENT:: NORMAL
CREAT SERPL-MCNC: 0.69 MG/DL (ref 0.55–1.02)
DIFFERENTIAL METHOD BLD: ABNORMAL
EKG ATRIAL RATE: 75 BPM
EKG DIAGNOSIS: NORMAL
EKG P AXIS: 54 DEGREES
EKG P-R INTERVAL: 300 MS
EKG Q-T INTERVAL: 452 MS
EKG QRS DURATION: 84 MS
EKG QTC CALCULATION (BAZETT): 504 MS
EKG R AXIS: -24 DEGREES
EKG T AXIS: -62 DEGREES
EKG VENTRICULAR RATE: 75 BPM
EOSINOPHIL # BLD: 0 K/UL (ref 0–0.4)
EOSINOPHIL NFR BLD: 0 % (ref 0–7)
EPITH CASTS URNS QL MICRO: NORMAL /LPF
ERYTHROCYTE [DISTWIDTH] IN BLOOD BY AUTOMATED COUNT: 16.3 % (ref 11.5–14.5)
FERRITIN SERPL-MCNC: 115 NG/ML (ref 15–150)
GLUCOSE BLD STRIP.AUTO-MCNC: 97 MG/DL (ref 65–117)
GLUCOSE SERPL-MCNC: 114 MG/DL (ref 65–100)
GLUCOSE UR STRIP.AUTO-MCNC: NEGATIVE MG/DL
HCT VFR BLD AUTO: 33.6 % (ref 35–47)
HGB BLD-MCNC: 10.4 G/DL (ref 11.5–16)
HGB UR QL STRIP: NEGATIVE
HYALINE CASTS URNS QL MICRO: NORMAL /LPF (ref 0–5)
IMM GRANULOCYTES # BLD AUTO: 0.1 K/UL (ref 0–0.04)
IMM GRANULOCYTES NFR BLD AUTO: 1 % (ref 0–0.5)
IRON SATN MFR SERPL: 11 % (ref 15–55)
IRON SERPL-MCNC: 29 UG/DL (ref 27–139)
KETONES UR QL STRIP.AUTO: NEGATIVE MG/DL
LDH SERPL L TO P-CCNC: 331 IU/L (ref 119–226)
LEUKOCYTE ESTERASE UR QL STRIP.AUTO: NEGATIVE
LYMPHOCYTES # BLD: 0.4 K/UL (ref 0.8–3.5)
LYMPHOCYTES NFR BLD: 5 % (ref 12–49)
MCH RBC QN AUTO: 26.9 PG (ref 26–34)
MCHC RBC AUTO-ENTMCNC: 31 G/DL (ref 30–36.5)
MCV RBC AUTO: 86.8 FL (ref 80–99)
MONOCYTES # BLD: 0.9 K/UL (ref 0–1)
MONOCYTES NFR BLD: 11 % (ref 5–13)
NEUTS SEG # BLD: 6.7 K/UL (ref 1.8–8)
NEUTS SEG NFR BLD: 83 % (ref 32–75)
NITRITE UR QL STRIP.AUTO: NEGATIVE
NRBC # BLD: 0 K/UL (ref 0–0.01)
NRBC BLD-RTO: 0 PER 100 WBC
PH UR STRIP: 7 (ref 5–8)
PLATELET # BLD AUTO: 249 K/UL (ref 150–400)
PMV BLD AUTO: 10.4 FL (ref 8.9–12.9)
POTASSIUM SERPL-SCNC: 3.4 MMOL/L (ref 3.5–5.1)
PROT UR STRIP-MCNC: NEGATIVE MG/DL
RBC # BLD AUTO: 3.87 M/UL (ref 3.8–5.2)
RBC #/AREA URNS HPF: NORMAL /HPF (ref 0–5)
RBC MORPH BLD: ABNORMAL
RBC MORPH BLD: ABNORMAL
SERVICE CMNT-IMP: NORMAL
SODIUM SERPL-SCNC: 130 MMOL/L (ref 136–145)
SP GR UR REFRACTOMETRY: 1 (ref 1–1.03)
SPECIMEN EXP DATE BLD: NORMAL
SPECIMEN HOLD: NORMAL
TIBC SERPL-MCNC: 253 UG/DL (ref 250–450)
TROPONIN I SERPL HS-MCNC: 15 NG/L (ref 0–51)
TROPONIN I SERPL HS-MCNC: 15 NG/L (ref 0–51)
TROPONIN I SERPL HS-MCNC: 17 NG/L (ref 0–51)
UIBC SERPL-MCNC: 224 UG/DL (ref 118–369)
URINE CULTURE IF INDICATED: NORMAL
UROBILINOGEN UR QL STRIP.AUTO: 0.2 EU/DL (ref 0.2–1)
WBC # BLD AUTO: 8.1 K/UL (ref 3.6–11)
WBC URNS QL MICRO: NORMAL /HPF (ref 0–4)

## 2024-10-30 PROCEDURE — 86900 BLOOD TYPING SEROLOGIC ABO: CPT

## 2024-10-30 PROCEDURE — G0378 HOSPITAL OBSERVATION PER HR: HCPCS

## 2024-10-30 PROCEDURE — 84484 ASSAY OF TROPONIN QUANT: CPT

## 2024-10-30 PROCEDURE — 2580000003 HC RX 258: Performed by: HOSPITALIST

## 2024-10-30 PROCEDURE — 80048 BASIC METABOLIC PNL TOTAL CA: CPT

## 2024-10-30 PROCEDURE — 85025 COMPLETE CBC W/AUTO DIFF WBC: CPT

## 2024-10-30 PROCEDURE — 82962 GLUCOSE BLOOD TEST: CPT

## 2024-10-30 PROCEDURE — 96360 HYDRATION IV INFUSION INIT: CPT

## 2024-10-30 PROCEDURE — 6370000000 HC RX 637 (ALT 250 FOR IP): Performed by: HOSPITALIST

## 2024-10-30 PROCEDURE — 86850 RBC ANTIBODY SCREEN: CPT

## 2024-10-30 PROCEDURE — 81001 URINALYSIS AUTO W/SCOPE: CPT

## 2024-10-30 PROCEDURE — 93005 ELECTROCARDIOGRAM TRACING: CPT | Performed by: EMERGENCY MEDICINE

## 2024-10-30 PROCEDURE — 86901 BLOOD TYPING SEROLOGIC RH(D): CPT

## 2024-10-30 PROCEDURE — 93010 ELECTROCARDIOGRAM REPORT: CPT | Performed by: INTERNAL MEDICINE

## 2024-10-30 PROCEDURE — 99285 EMERGENCY DEPT VISIT HI MDM: CPT

## 2024-10-30 PROCEDURE — 70450 CT HEAD/BRAIN W/O DYE: CPT

## 2024-10-30 PROCEDURE — 96361 HYDRATE IV INFUSION ADD-ON: CPT

## 2024-10-30 PROCEDURE — 36415 COLL VENOUS BLD VENIPUNCTURE: CPT

## 2024-10-30 RX ORDER — ACETAMINOPHEN 325 MG/1
650 TABLET ORAL EVERY 6 HOURS PRN
Status: DISCONTINUED | OUTPATIENT
Start: 2024-10-30 | End: 2024-11-01 | Stop reason: HOSPADM

## 2024-10-30 RX ORDER — MAGNESIUM SULFATE IN WATER 40 MG/ML
2000 INJECTION, SOLUTION INTRAVENOUS PRN
Status: DISCONTINUED | OUTPATIENT
Start: 2024-10-30 | End: 2024-10-31

## 2024-10-30 RX ORDER — SODIUM CHLORIDE, SODIUM LACTATE, POTASSIUM CHLORIDE, CALCIUM CHLORIDE 600; 310; 30; 20 MG/100ML; MG/100ML; MG/100ML; MG/100ML
INJECTION, SOLUTION INTRAVENOUS CONTINUOUS
Status: DISPENSED | OUTPATIENT
Start: 2024-10-30 | End: 2024-10-31

## 2024-10-30 RX ORDER — ACETAMINOPHEN 500 MG
1000 TABLET ORAL EVERY 12 HOURS
Status: DISCONTINUED | OUTPATIENT
Start: 2024-10-30 | End: 2024-11-01 | Stop reason: HOSPADM

## 2024-10-30 RX ORDER — POTASSIUM CHLORIDE 7.45 MG/ML
10 INJECTION INTRAVENOUS PRN
Status: DISCONTINUED | OUTPATIENT
Start: 2024-10-30 | End: 2024-10-31

## 2024-10-30 RX ORDER — SODIUM CHLORIDE 0.9 % (FLUSH) 0.9 %
5-40 SYRINGE (ML) INJECTION PRN
Status: DISCONTINUED | OUTPATIENT
Start: 2024-10-30 | End: 2024-11-01 | Stop reason: HOSPADM

## 2024-10-30 RX ORDER — SODIUM CHLORIDE 0.9 % (FLUSH) 0.9 %
5-40 SYRINGE (ML) INJECTION EVERY 12 HOURS SCHEDULED
Status: DISCONTINUED | OUTPATIENT
Start: 2024-10-30 | End: 2024-11-01 | Stop reason: HOSPADM

## 2024-10-30 RX ORDER — POTASSIUM CHLORIDE 750 MG/1
40 TABLET, EXTENDED RELEASE ORAL PRN
Status: DISCONTINUED | OUTPATIENT
Start: 2024-10-30 | End: 2024-10-31

## 2024-10-30 RX ORDER — TIMOLOL MALEATE 5 MG/ML
1 SOLUTION/ DROPS OPHTHALMIC 2 TIMES DAILY
Status: DISCONTINUED | OUTPATIENT
Start: 2024-10-30 | End: 2024-11-01 | Stop reason: HOSPADM

## 2024-10-30 RX ORDER — SODIUM CHLORIDE 9 MG/ML
INJECTION, SOLUTION INTRAVENOUS PRN
Status: DISCONTINUED | OUTPATIENT
Start: 2024-10-30 | End: 2024-11-01 | Stop reason: HOSPADM

## 2024-10-30 RX ORDER — POLYETHYLENE GLYCOL 3350 17 G/17G
17 POWDER, FOR SOLUTION ORAL DAILY PRN
Status: DISCONTINUED | OUTPATIENT
Start: 2024-10-30 | End: 2024-11-01 | Stop reason: HOSPADM

## 2024-10-30 RX ORDER — ONDANSETRON 2 MG/ML
4 INJECTION INTRAMUSCULAR; INTRAVENOUS EVERY 6 HOURS PRN
Status: DISCONTINUED | OUTPATIENT
Start: 2024-10-30 | End: 2024-11-01 | Stop reason: HOSPADM

## 2024-10-30 RX ORDER — ONDANSETRON 4 MG/1
4 TABLET, ORALLY DISINTEGRATING ORAL EVERY 8 HOURS PRN
Status: DISCONTINUED | OUTPATIENT
Start: 2024-10-30 | End: 2024-11-01 | Stop reason: HOSPADM

## 2024-10-30 RX ORDER — ACETAMINOPHEN 650 MG/1
650 SUPPOSITORY RECTAL EVERY 6 HOURS PRN
Status: DISCONTINUED | OUTPATIENT
Start: 2024-10-30 | End: 2024-11-01 | Stop reason: HOSPADM

## 2024-10-30 RX ADMIN — POTASSIUM BICARBONATE 40 MEQ: 782 TABLET, EFFERVESCENT ORAL at 15:50

## 2024-10-30 RX ADMIN — ACETAMINOPHEN 1000 MG: 500 TABLET ORAL at 20:51

## 2024-10-30 RX ADMIN — SODIUM CHLORIDE, POTASSIUM CHLORIDE, SODIUM LACTATE AND CALCIUM CHLORIDE: 600; 310; 30; 20 INJECTION, SOLUTION INTRAVENOUS at 15:58

## 2024-10-30 RX ADMIN — Medication 10 ML: at 22:08

## 2024-10-30 ASSESSMENT — PAIN - FUNCTIONAL ASSESSMENT: PAIN_FUNCTIONAL_ASSESSMENT: NONE - DENIES PAIN

## 2024-10-30 ASSESSMENT — PAIN SCALES - GENERAL: PAINLEVEL_OUTOF10: 0

## 2024-10-30 NOTE — ED TRIAGE NOTES
Pt arrives via EMS from [facility].   Report of CC seizure like activity at the facility while pt was in the bathroom    BG 89    Hx of metastatic cancer    Pt is on Xarelto     
08-May-2022 11:00

## 2024-10-30 NOTE — H&P
History and Physical    Date of Service:  10/30/2024  Primary Care Provider: Valentina Hills MD  Source of information: The patient and Chart review    Chief Complaint: Altered mental status.      History of Presenting Illness:   Elizabeth Kay is a 89 y.o. female currently at skilled nursing facility, sent to the ED for evaluation when she was found slumped over and unresponsive on the toilet today.  Per ED notes, there was reportedly mention of seizure by nursing home staff.  Patient does not have history of seizure or epilepsy.  Patient reportedly back to her baseline per family.    Patient has a significant medical history of atrial fibrillation, she was taken off Xarelto during recent admission for GI bleed.  Other PMH include recent admission for GIB, CHF, hypertension, peptic ulcer disease, gastric cancer status post partial gastrectomy and recent diagnosis of left renal and adrenal masses with retroperitoneal lymphadenopathy, renal biopsy suggesting small B-cell lymphoma, following with hematology oncology.    Pertinent workup findings in the ED include , K3.4, chloride 90, CO2 to 33, cardiac enzymes negative x 3.  Hb 10.4  UA negative  CT head negative    The patient denies any headache, blurry vision, sore throat, trouble swallowing, trouble with speech, chest pain, SOB, cough, fever, chills, N/V/D, abd pain, urinary symptoms, constipation, recent travels, sick contacts, focal or generalized neurological symptoms, falls, injuries, rashes, contact with COVID-19 diagnosed patients, hematemesis, melena, hemoptysis, hematuria, rashes, denies starting any new medications and denies any other concerns or problems besides as mentioned above.         REVIEW OF SYSTEMS:  A comprehensive review of systems was negative except for that written in the History of Present Illness.     Past Medical History:   Diagnosis Date    A-fib (HCC)     Arthritis     Cancer (HCC)     STOMACH    Hypertension      or hernias.    Musculoskeletal/Ext: Full range of motion in all extremities.  No joint swelling, tenderness or deformity.  No clubbing, no cyanosis, no edema    Neuro/Psych: Pleasant mood and affect.  Alert and oriented to place, person, time and situation (A and O x 4).  Cranial nerves II-XII are intact.  Motor exam strength 5/5 in upper and lower extremities bilaterally.  Sensory, intact to light touch, pain and temperature.  Gait: Steady and coordinated    Skin: Warm, dry, without rashes or lesions    Data Review:   I have independently reviewed and interpreted patient's lab and all other diagnostic data    Abnormal Labs Reviewed   CBC WITH AUTO DIFFERENTIAL - Abnormal; Notable for the following components:       Result Value    Hemoglobin 10.4 (*)     Hematocrit 33.6 (*)     RDW 16.3 (*)     Neutrophils % 83 (*)     Lymphocytes % 5 (*)     Immature Granulocytes % 1 (*)     Lymphocytes Absolute 0.4 (*)     Immature Granulocytes Absolute 0.1 (*)     All other components within normal limits   BASIC METABOLIC PANEL - Abnormal; Notable for the following components:    Sodium 130 (*)     Potassium 3.4 (*)     Chloride 90 (*)     CO2 33 (*)     Glucose 114 (*)     All other components within normal limits       [unfilled]    IMAGING:   CT HEAD WO CONTRAST   Final Result   No acute intracranial abnormality.         Electronically signed by BARRON GAMBINO           ECG/ECHO:  [unfilled]       Notes reviewed from all clinical/nonclinical/nursing services involved in patient's clinical care. Care coordination discussions were held with appropriate clinical/nonclinical/ nursing providers based on care coordination needs.     Assessment and plan   Given the patient's current clinical presentation, there is a high level of concern for decompensation if discharged from the emergency department. Complex decision making was performed, which includes reviewing the patient's available past medical records, laboratory

## 2024-10-30 NOTE — CARE COORDINATION
CM consult received and appreciated. EMR reviewed. Noted presented to ED w/ CC of seizure like activity from the [facility]. Recent hospitalization 10//17.     Met w/patient and family granddaughter / MPOA Deysi Kay and daughter Mei Espinal introduced to role.     Family verbalized do not want patient to return back to facility. This writer acknowledged will wait for MD regarding medical processes and updates. Noted [employer] and will need authorization.     This writer acknowledged may need to have patient return to current facility and assist w/ transfer however will await medical wort up first     Preferred facility is [facility] .     Updates provided to Dr. Ko Arteaga.     Patient is a DNR and AMD on file from hospitalization 2 weeks ago.     CM Department will follow for ABDULAZIZ needs.     1730 Noted patient is OBS. Pending PT/OT evaluations. Referral placed to [facility] (FOC request of family) via [employer]/ [employer]. [employer] authorization will need to be initiated.

## 2024-10-30 NOTE — ED PROVIDER NOTES
Body mass index is 32.15 kg/m².    Physical Exam  Constitutional:       General: She is not in acute distress.     Appearance: Normal appearance. She is not ill-appearing, toxic-appearing or diaphoretic.   HENT:      Head: Normocephalic.      Mouth/Throat:      Mouth: Mucous membranes are moist.      Pharynx: Oropharynx is clear. No oropharyngeal exudate or posterior oropharyngeal erythema.   Eyes:      General: No scleral icterus.     Extraocular Movements: Extraocular movements intact.      Conjunctiva/sclera: Conjunctivae normal.      Pupils: Pupils are equal, round, and reactive to light.   Cardiovascular:      Rate and Rhythm: Normal rate and regular rhythm.      Pulses: Normal pulses.      Heart sounds: No murmur heard.     No friction rub.   Pulmonary:      Effort: Pulmonary effort is normal. No respiratory distress.      Breath sounds: Normal breath sounds. No stridor. No wheezing, rhonchi or rales.   Abdominal:      General: Abdomen is flat. There is no distension.      Palpations: Abdomen is soft.      Tenderness: There is no abdominal tenderness. There is no guarding or rebound.   Genitourinary:     Comments: No gross blood or melena on DANITZA  Musculoskeletal:         General: No swelling.      Cervical back: Normal range of motion. No rigidity.      Right lower leg: No edema.      Left lower leg: No edema.   Skin:     General: Skin is warm and dry.      Capillary Refill: Capillary refill takes less than 2 seconds.      Coloration: Skin is not jaundiced.   Neurological:      General: No focal deficit present.      Mental Status: She is alert.      Cranial Nerves: No cranial nerve deficit.      Sensory: No sensory deficit.      Motor: No weakness.      Coordination: Coordination normal.         DIAGNOSTIC RESULTS     EKG: All EKG's are interpreted by the Emergency Department Physician who either signs or Co-signs this chart in the absence of a cardiologist.    EKG Interpretation  SR, narrow QRS,     Antibody Screen 10/30/2024 NEG   Final    Specimen HOld 10/30/2024 1RED 1BLU   Final    Comment: 10/30/2024 Add-on orders for these samples will be processed based on acceptable specimen integrity and analyte stability, which may vary by analyte.    Final    POC Glucose 10/30/2024 97  65 - 117 mg/dL Final    Comment: (NOTE)  The FDA has indicated that no capillary point of care blood glucose  monitoring systems are approved for use in \"critically ill\" patients,  however they have not defined this population. The College of  American Pathologists has recommended that these devices should not  be used in cases such as severe hypotension, dehydration, shock, and  hyperglycemic-hyperosmolar state, amongst others.  Venous or arterial  collection is the recommended specimen for testing these patients.      Performed by: 10/30/2024 GORDON Swain   Final    Color, UA 10/30/2024 YELLOW/STRAW    Final    Color Reference Range: Straw, Yellow or Dark Yellow    Appearance 10/30/2024 CLEAR  CLEAR   Final    Specific Gravity, UA 10/30/2024 1.005  1.003 - 1.030   Final    pH, Urine 10/30/2024 7.0  5.0 - 8.0   Final    Protein, UA 10/30/2024 Negative  NEG mg/dL Final    Glucose, Ur 10/30/2024 Negative  NEG mg/dL Final    Ketones, Urine 10/30/2024 Negative  NEG mg/dL Final    Bilirubin, Urine 10/30/2024 Negative  NEG   Final    Blood, Urine 10/30/2024 Negative  NEG   Final    Urobilinogen, Urine 10/30/2024 0.2  0.2 - 1.0 EU/dL Final    Nitrite, Urine 10/30/2024 Negative  NEG   Final    Leukocyte Esterase, Urine 10/30/2024 Negative  NEG   Final    WBC, UA 10/30/2024 0-4  0 - 4 /hpf Final    RBC, UA 10/30/2024 0-5  0 - 5 /hpf Final    Epithelial Cells, UA 10/30/2024 FEW  FEW /lpf Final    Epithelial cell category consists of squamous cells and /or transitional urothelial cells. Renal tubular cells, if present, are separately identified as such.    BACTERIA, URINE 10/30/2024 Negative  NEG /hpf Final    Urine Culture if Indicated

## 2024-10-30 NOTE — PROGRESS NOTES
TRANSFER OUT    Verbal report called to Yulia  RN on Elizabeth Diorming transferring to [facility]. Report included SBAR, ED summary, MAR, and recent results. Time for questions given. Patient to be transferred to [facility] via this RN & cardiac monitor.

## 2024-10-31 PROBLEM — R55 NEAR SYNCOPE: Status: ACTIVE | Noted: 2024-10-31

## 2024-10-31 LAB
ANION GAP SERPL CALC-SCNC: 8 MMOL/L (ref 2–12)
BUN SERPL-MCNC: 7 MG/DL (ref 6–20)
BUN/CREAT SERPL: 12 (ref 12–20)
CALCIUM SERPL-MCNC: 8.6 MG/DL (ref 8.5–10.1)
CHLORIDE SERPL-SCNC: 94 MMOL/L (ref 97–108)
CO2 SERPL-SCNC: 30 MMOL/L (ref 21–32)
CREAT SERPL-MCNC: 0.6 MG/DL (ref 0.55–1.02)
GLUCOSE SERPL-MCNC: 88 MG/DL (ref 65–100)
HCT VFR BLD AUTO: 36.6 % (ref 35–47)
HGB BLD-MCNC: 11.2 G/DL (ref 11.5–16)
POTASSIUM SERPL-SCNC: 4.4 MMOL/L (ref 3.5–5.1)
SODIUM SERPL-SCNC: 132 MMOL/L (ref 136–145)
TROPONIN I SERPL HS-MCNC: 15 NG/L (ref 0–51)
TROPONIN I SERPL HS-MCNC: 18 NG/L (ref 0–51)

## 2024-10-31 PROCEDURE — 6370000000 HC RX 637 (ALT 250 FOR IP): Performed by: NURSE PRACTITIONER

## 2024-10-31 PROCEDURE — 6370000000 HC RX 637 (ALT 250 FOR IP): Performed by: HOSPITALIST

## 2024-10-31 PROCEDURE — 85018 HEMOGLOBIN: CPT

## 2024-10-31 PROCEDURE — G0378 HOSPITAL OBSERVATION PER HR: HCPCS

## 2024-10-31 PROCEDURE — 97530 THERAPEUTIC ACTIVITIES: CPT

## 2024-10-31 PROCEDURE — 97161 PT EVAL LOW COMPLEX 20 MIN: CPT

## 2024-10-31 PROCEDURE — 80048 BASIC METABOLIC PNL TOTAL CA: CPT

## 2024-10-31 PROCEDURE — 84484 ASSAY OF TROPONIN QUANT: CPT

## 2024-10-31 PROCEDURE — 97165 OT EVAL LOW COMPLEX 30 MIN: CPT

## 2024-10-31 PROCEDURE — 85014 HEMATOCRIT: CPT

## 2024-10-31 PROCEDURE — 96361 HYDRATE IV INFUSION ADD-ON: CPT

## 2024-10-31 PROCEDURE — 2580000003 HC RX 258: Performed by: HOSPITALIST

## 2024-10-31 PROCEDURE — 1110000000 HC RM PRIVATE GYN

## 2024-10-31 PROCEDURE — 2709999900 HC NON-CHARGEABLE SUPPLY

## 2024-10-31 PROCEDURE — 76937 US GUIDE VASCULAR ACCESS: CPT

## 2024-10-31 RX ORDER — LOSARTAN POTASSIUM 50 MG/1
50 TABLET ORAL DAILY
Status: DISCONTINUED | OUTPATIENT
Start: 2024-10-31 | End: 2024-10-31

## 2024-10-31 RX ORDER — FLECAINIDE ACETATE 50 MG/1
50 TABLET ORAL 2 TIMES DAILY
Status: DISCONTINUED | OUTPATIENT
Start: 2024-10-31 | End: 2024-10-31

## 2024-10-31 RX ORDER — AMIODARONE HYDROCHLORIDE 200 MG/1
100 TABLET ORAL DAILY
COMMUNITY

## 2024-10-31 RX ORDER — LOSARTAN POTASSIUM 50 MG/1
100 TABLET ORAL DAILY
Status: DISCONTINUED | OUTPATIENT
Start: 2024-10-31 | End: 2024-11-01 | Stop reason: HOSPADM

## 2024-10-31 RX ORDER — AMIODARONE HYDROCHLORIDE 200 MG/1
100 TABLET ORAL DAILY
Status: DISCONTINUED | OUTPATIENT
Start: 2024-10-31 | End: 2024-11-01 | Stop reason: HOSPADM

## 2024-10-31 RX ADMIN — FLECAINIDE ACETATE 50 MG: 50 TABLET ORAL at 10:28

## 2024-10-31 RX ADMIN — Medication 5 ML: at 08:53

## 2024-10-31 RX ADMIN — TIMOLOL MALEATE 1 DROP: 5 SOLUTION/ DROPS OPHTHALMIC at 22:03

## 2024-10-31 RX ADMIN — TIMOLOL MALEATE 1 DROP: 5 SOLUTION/ DROPS OPHTHALMIC at 08:39

## 2024-10-31 RX ADMIN — ACETAMINOPHEN 1000 MG: 500 TABLET ORAL at 21:55

## 2024-10-31 RX ADMIN — LOSARTAN POTASSIUM 100 MG: 50 TABLET, FILM COATED ORAL at 10:28

## 2024-10-31 NOTE — CARE COORDINATION
Care Management Initial Assessment       RUR:  Readmission? No  1st IM letter given? No  1st  letter given: No     10/31/24 1127   Service Assessment   Patient Orientation Alert and Oriented   Cognition Alert   History Provided By Patient;Child/Family   Primary Caregiver Self   Support Systems Children;Family Members   Patient's Healthcare Decision Maker is: Legal Next of Kin   PCP Verified by CM Yes   Last Visit to PCP Within last 6 months   Prior Functional Level Assistance with the following:;Bathing;Dressing;Toileting;Mobility   Current Functional Level Assistance with the following:;Bathing;Dressing;Toileting;Mobility   Can patient return to prior living arrangement Unknown at present   Ability to make needs known: Good   Family able to assist with home care needs: Yes   Social/Functional History   Lives With Family;Daughter   Type of Home House   Home Layout Two level   Home Access Ramped entrance   Home Equipment Cane;Walker - Rolling   Receives Help From Family   ADL Assistance Needs assistance   Toileting Needs assistance   Homemaking Assistance Needs assistance   Ambulation Assistance Needs assistance   Transfer Assistance Needs assistance   Active  No   Patient's  Info Daughter evan transports   Mode of Transportation Car   Occupation Retired   Discharge Planning   Type of Residence Skilled Nursing Facility   Living Arrangements Alone   Patient expects to be discharged to: Skilled nursing facility   History of falls? 0   Services At/After Discharge   Transition of Care Consult (CM Consult) SNF   Partner SNF No   Reason Why Partner SNF Not Chosen Positive previous encounter   [place] Resource Information Provided? No   Mode of Transport at Discharge BLS   Confirm Follow Up Transport Self   Condition of Participation: Discharge Planning   The Patient and/or Patient Representative was provided with a Choice of Provider? Patient;Patient Representative   Name of the Patient Representative

## 2024-10-31 NOTE — WOUND CARE
WOCN Note:     New consult placed for \"open wound on L buttock, about the size of a quarter, no drainage, pink edges.\"    Assessed in 352/01.    Elizabeth Kay is a 89 y.o. y/o female who presented for Syncope and collapse [R55]  Prolonged Q-T interval on ECG [R94.31]  Syncope, unspecified syncope type [R55]  Near syncope [R55]  Admitted on 10/30/2024; LOS: 0    Past Medical History:   Diagnosis Date    A-fib (HCC)     Arthritis     Cancer (HCC)     STOMACH    Hypertension     PUD (peptic ulcer disease)      Lab Results   Component Value Date/Time    WBC 8.1 10/30/2024 11:56 AM     ADULT DIET; Regular     Assessment:   Patient is alert, communicative and requires assist with repositioning.    Patient reports no pain.   Patient repositioned on left side with pillow.  Heels offloaded with pillows.   Heels intact without erythema.        Wound Assessment  POA Bilateral buttocks, MASD  Left side 1 x 3 x 0.1 cm  Right side 0.5 x 0.5 x 0.1 cm  100% pink; no drainage or odor. Marika wound hyperpigmented.  TX:  cleansed and applied Triad wound cream and foam dressing        2.  POA left lateral low leg, partial thickness wound  1.5 x 1.5 x 0.1 cm  100% tan; no drainage or odor.  Marika wound hyperpigmented.  Tx:  cleansed and applied Triad wound cream and foam dressing.      Wound, Pressure Prevention & Skin Care Recommendations:    Minimize layers of linen/pads under patient to optimize support surface.    2.  Turn/reposition approximately every 2 hours and offload heels.   3.  Manage moisture/ Keep skin folds clean and dry/minimize brief usage.  4.  Specialty bed: immerse low air loss. Use only flat sheet and one incontinence pad.  5.  Buttocks and left lateral leg:  Daily cleanse with saline; apply triad wound cream and foam dressing.     Discussed above plan with patient & Morenita WOODARD.    Transition of Care:   Plan to follow as needed while admitted to hospital.    JOYCE BrandtN RN [facility] Inpatient Wound

## 2024-10-31 NOTE — PROGRESS NOTES
Attempted to deliver and verbally explain the MOON with patient. Patient was with clinical staff. Jyothi Shell, Care Management Assistant

## 2024-10-31 NOTE — FLOWSHEET NOTE
10/31/24 0839   Vitals   Temp 98.2 °F (36.8 °C)   Temp Source Oral   Pulse 69   Heart Rate Source Monitor   Respirations 17   BP (!) 161/77   MAP (Calculated) 105   BP Location Left upper arm   BP Upper/Lower Upper   BP Method Automatic   Pain Assessment   Pain Assessment None - Denies Pain     Team notified of elevated BP and patients IV infiltrated. Pt is a difficult stick. IV fluids paused for now while we work on getting IV and awaiting any new orders.

## 2024-10-31 NOTE — PROCEDURES
Vascular Access Team - peripheral IV catheter insertion note     A 20 gauge, 45 mm (1.75 inch) PIV was inserted into the right antecubital fossa  using ultrasound guidance. The IV flushed easily and had brisk blood return. The patient tolerated the procedure well.   Kwabena Katz RN

## 2024-10-31 NOTE — PLAN OF CARE
Problem: Occupational Therapy - Adult  Goal: By Discharge: Performs self-care activities at highest level of function for planned discharge setting.  See evaluation for individualized goals.  Description: FUNCTIONAL STATUS PRIOR TO ADMISSION:  Pt admitted from SNF with recent [facility] admission (10/15/24). Daughter lives with patient in house.     Receives Help From: Family, ADL Assistance: Needs assistance, Toileting: Needs assistance, Homemaking Assistance: Needs assistance, Ambulation Assistance: Needs assistance, Transfer Assistance: Needs assistance, Active : No     HOME SUPPORT: Patient lived daughter and required assistance for ADLs.    Occupational Therapy Goals:  Initiated 10/31/2024  1.  Patient will perform simple grooming task with Set-up within 7 day(s).  2.  Patient will perform bathing from anterior neck to thigh with Set-up within 7 day(s).  3.  Patient will perform upper body dressing with Minimal Assist within 7 day(s).  4.  Patient will perform toilet transfers with Contact Guard Assist  within 7 day(s).  5.  Patient will perform all aspects of toileting with Moderate Assist within 7 day(s).  6.  Patient will participate in upper extremity therapeutic exercise/activities with Stand by Assist for 5 minutes within 7 day(s).    7.  Patient will utilize energy conservation techniques during functional activities with verbal cues within 7 day(s).    Outcome: Progressing     OCCUPATIONAL THERAPY EVALUATION    Patient: Elizabeth Kay (89 y.o. female)  Date: 10/31/2024  Primary Diagnosis: Syncope and collapse [R55]  Prolonged Q-T interval on ECG [R94.31]  Syncope, unspecified syncope type [R55]  Near syncope [R55]       Precautions:                    ASSESSMENT :  The patient is limited by decreased functional mobility, independence in ADLs, strength, activity tolerance, balance following admission for syncope. Pt cleared for therapy by nursing and received supine in bed agreeable to therapy.

## 2024-10-31 NOTE — PROGRESS NOTES
Consult received, chart reviewed, pt not yet cleared for therapy evals this morning secondary to elevated BP and nurse awaiting some medication reconciliation for the BP. Will follow and see as able and appropriate. Shamika Montano, PT

## 2024-10-31 NOTE — PROGRESS NOTES
10/31/24 1345 10/31/24 1351 10/31/24 1357   Vitals   Pulse 73 81 81   BP (!) 160/92 (!) 156/85 (!) 154/86   MAP (Calculated) 115 109 109   BP Location Left upper arm Left upper arm Left upper arm   BP Upper/Lower Upper Upper Upper   BP Method Automatic Automatic Automatic   Patient Position Supine Sitting Sitting;Up in chair   SpO2 93 % 98 % 97 %   O2 Flow Rate (L/min) 2 L/min 2 L/min 2 L/min   O2 Device Nasal cannula Nasal cannula Nasal cannula

## 2024-10-31 NOTE — PLAN OF CARE
Problem: Physical Therapy - Adult  Goal: By Discharge: Performs mobility at highest level of function for planned discharge setting.  See evaluation for individualized goals.  Description: FUNCTIONAL STATUS PRIOR TO ADMISSION: Patient was modified independent using a rollator/single point cane for functional mobility. She was recently admitted and discharged to a SNF for rehab earlier this month.    HOME SUPPORT PRIOR TO ADMISSION: The patient lived with her daughter but did not require assistance at baseline..    Physical Therapy Goals  Initiated 10/31/2024  1.  Patient will move from supine to sit and sit to supine, scoot up and down, and roll side to side in bed with minimal assistance within 7 day(s).    2.  Patient will perform sit to stand with contact guard assist within 7 day(s).  3.  Patient will transfer from bed to chair and chair to bed with contact guard assist using the least restrictive device within 7 day(s).  4.  Patient will ambulate with contact guard assist for 25 feet with the least restrictive device within 7 day(s).     Outcome: Progressing   PHYSICAL THERAPY EVALUATION    Patient: Elizabeth Kay (89 y.o. female)  Date: 10/31/2024  Primary Diagnosis: Syncope and collapse [R55]  Prolonged Q-T interval on ECG [R94.31]  Syncope, unspecified syncope type [R55]  Near syncope [R55]       Precautions: Restrictions/Precautions: Fall Risk, Bed Alarm                      ASSESSMENT :   DEFICITS/IMPAIRMENTS:   The patient is limited by decreased functional mobility, strength, balance, admitted after syncope and collapse while on the toilet at the SNF where she was for rehab. She required mod assist X 2 to come to sitting at the EOB and min assist for amb over to the chair, VSS      Based on the impairments listed above anticipate slow steady gains and a need for short term rehab..        10/31/24 1345 10/31/24 1351 10/31/24 1357    Vitals   Pulse 73 81 81   BP (!) 160/92 (!) 156/85 (!) 154/86   MAP

## 2024-10-31 NOTE — PROGRESS NOTES
her there when she became dizzy and does not recall events after that. Pt stated she was sitting on toilet attempting to have a BM. Pt medically stable for discharge. Accepted to [facility]. Discussed with CM  Assessment & Plan:     Defecation Syncope - Resolved  - Episode of loss of consciousness at SNF while sitting on toilet moving bowels, patient returned to baseline by the time she was in the ED.      Mild hyponatremia - improving. Takes Torsemide prn for swelling    Hypokalemia - resolved     History of A-fib - c/w amiodarone. s/p cardioversion, off of Xarelto due to recent GI bleed.    Hypertension - resume losartan. Pt was taking torsemide 20 mg prn daily    Glaucoma, continue eyedrops    Left renal and adrenal masses, biopsy suggesting B-cell lymphoma.  Patient follows with Dr. Haas.           Code status: DNR  Prophylaxis: SCD's  Care Plan discussed with: patient, nursing, Attending  Anticipated Disposition: SNF, ? [facility]  Inpatient  Cardiac monitoring: Telemetry  Central Line:     Pt has [employer], ? auth         Social Determinants of Health     Tobacco Use: Low Risk  (9/29/2024)    Patient History     Smoking Tobacco Use: Never     Smokeless Tobacco Use: Never     Passive Exposure: Not on file   Alcohol Use: Not on file   Financial Resource Strain: Not on file   Food Insecurity: No Food Insecurity (10/11/2024)    Hunger Vital Sign     Worried About Running Out of Food in the Last Year: Never true     Ran Out of Food in the Last Year: Never true   Transportation Needs: No Transportation Needs (10/11/2024)    PRAPARE - Transportation     Lack of Transportation (Medical): No     Lack of Transportation (Non-Medical): No   Physical Activity: Not on file   Stress: Not on file   Social Connections: Not on file   Intimate Partner Violence: Not on file   Depression: Not on file   Housing Stability: Low Risk  (10/11/2024)    Housing Stability Vital Sign     Unable to Pay for Housing in the Last Year: No     Number  IntraVENous Q6H PRN    polyethylene glycol (GLYCOLAX) packet 17 g  17 g Oral Daily PRN    acetaminophen (TYLENOL) tablet 650 mg  650 mg Oral Q6H PRN    Or    acetaminophen (TYLENOL) suppository 650 mg  650 mg Rectal Q6H PRN    lactated ringers infusion   IntraVENous Continuous    timolol (TIMOPTIC) 0.5 % ophthalmic solution 1 drop  1 drop Both Eyes BID    acetaminophen (TYLENOL) tablet 1,000 mg  1,000 mg Oral Q12H     ______________________________________________________________________  EXPECTED LENGTH OF STAY: 2  ACTUAL LENGTH OF STAY:          0                 AL Simms NP

## 2024-10-31 NOTE — CARE COORDINATION
Transition of Care Plan:    RUR: Obs  Prior Level of Functioning: assist with all ADLs was admitted from SNF  Disposition: TBD  DANO:   If SNF or IPR: Date FOC offered: 10/30/24  Date FOC received: 10/30/24  Accepting facility:   Date authorization started with reference number:   Date authorization received and expires:   Follow up appointments:   DME needed:   Transportation at discharge: BLS  IM/IMM Medicare/ letter given:   Is patient a [place] and connected with VA?   If yes, was [place] transfer form completed and VA notified?   Caregiver Contact:Lucie [phone]   Discharge Caregiver contacted prior to discharge?   Care Conference needed?   Barriers to discharge:   Admitted from [facility]. Recent admit here and discharged to that facility. Family would like for her to go to [facility]. Previous CM sent referral and they have accepted.  Discharge pending progress and insurance authorization.

## 2024-11-01 VITALS
HEIGHT: 59 IN | HEART RATE: 76 BPM | BODY MASS INDEX: 29.78 KG/M2 | OXYGEN SATURATION: 100 % | SYSTOLIC BLOOD PRESSURE: 145 MMHG | RESPIRATION RATE: 17 BRPM | TEMPERATURE: 98.7 F | DIASTOLIC BLOOD PRESSURE: 67 MMHG | WEIGHT: 147.71 LBS

## 2024-11-01 PROCEDURE — 6370000000 HC RX 637 (ALT 250 FOR IP): Performed by: NURSE PRACTITIONER

## 2024-11-01 PROCEDURE — 94760 N-INVAS EAR/PLS OXIMETRY 1: CPT

## 2024-11-01 PROCEDURE — 2700000000 HC OXYGEN THERAPY PER DAY

## 2024-11-01 PROCEDURE — 6370000000 HC RX 637 (ALT 250 FOR IP): Performed by: HOSPITALIST

## 2024-11-01 RX ADMIN — AMIODARONE HYDROCHLORIDE 100 MG: 200 TABLET ORAL at 09:24

## 2024-11-01 RX ADMIN — LOSARTAN POTASSIUM 100 MG: 50 TABLET, FILM COATED ORAL at 09:24

## 2024-11-01 RX ADMIN — ACETAMINOPHEN 1000 MG: 500 TABLET ORAL at 09:24

## 2024-11-01 RX ADMIN — TIMOLOL MALEATE 1 DROP: 5 SOLUTION/ DROPS OPHTHALMIC at 09:24

## 2024-11-01 ASSESSMENT — PAIN SCALES - GENERAL: PAINLEVEL_OUTOF10: 3

## 2024-11-01 NOTE — CARE COORDINATION
Transition of Care Plan:    RUR: 16%-Low  Prior Level of Functioning: assist with all ADLs  Disposition: SNF  DANO: 11/1/24  If SNF or IPR: Date FOC offered:   Date FOC received:   Accepting facility: [facility]  Date authorization started with reference number: 10/31/24  Date authorization received and expires:  11/1/24 ref# [id number]   Follow up appointments:   DME needed: Has at home  Transportation at discharge: LORE  IM/IMM Medicare/ letter given: 11/1/24  Is patient a [place] and connected with VA?   If yes, was  transfer form completed and VA notified?   Caregiver Contact:Lucie  daughter  Discharge Caregiver contacted prior to discharge?   Care Conference needed?   Discharging to [facility] today with [facility] at 1500. Nursing to call report to number provided on envelope to be send with patient to facility. CM spoke with daughter Mei and she is in agreement with disposition.

## 2024-11-01 NOTE — DISCHARGE SUMMARY
Discharge Summary       PATIENT ID: Elizabeth Kay  MRN: [id number]   YOB: 1930    DATE OF ADMISSION: 10/30/2024 11:43 AM    DATE OF DISCHARGE: 11/1/2024  PRIMARY CARE PROVIDER: Valentina Hills MD     ATTENDING PHYSICIAN: Dr. Morneo  DISCHARGING PROVIDER: AL Simms NP    To contact this individual call [phone] and ask the  to page.  If unavailable ask to be transferred the Adult Hospitalist Department.    CONSULTATIONS: IP CONSULT TO CASE MANAGEMENT  IP WOUND CARE NURSE CONSULT TO EVAL    PROCEDURES/SURGERIES: * No surgery found *    ADMITTING DIAGNOSES & HOSPITAL COURSE:   Elizabeth Kay is a 89 y.o. female currently at skilled nursing facility, sent to the ED for evaluation when she was found slumped over and unresponsive on the toilet today.  Per ED notes, there was reportedly mention of seizure by nursing home staff.  Patient does not have history of seizure or epilepsy.  Patient reportedly back to her baseline per family.     Patient has a significant medical history of atrial fibrillation, she was taken off Xarelto during recent admission for GI bleed.  Other PMH include recent admission for GIB, CHF, hypertension, peptic ulcer disease, gastric cancer status post partial gastrectomy and recent diagnosis of left renal and adrenal masses with retroperitoneal lymphadenopathy, renal biopsy suggesting small B-cell lymphoma, following with hematology oncology.     Pertinent workup findings in the ED include , K3.4, chloride 90, CO2 to 33, cardiac enzymes negative x 3.  Hb 10.4  UA negative  CT head negative     The patient denies any headache, blurry vision, sore throat, trouble swallowing, trouble with speech, chest pain, SOB, cough, fever, chills, N/V/D, abd pain, urinary symptoms, constipation, recent travels, sick contacts, focal or generalized neurological symptoms, falls, injuries, rashes, contact with COVID-19 diagnosed patients, hematemesis, melena,  about.    DISPOSITION:    Home With:   OT  PT  HH  RN      x SNF    Independent/assisted living    Hospice    Other:       PATIENT CONDITION AT DISCHARGE:     Functional status    Poor    x Deconditioned     Independent      Cognition    x Lucid     Forgetful     Dementia      Catheters/lines (plus indication)    Smith     PICC     PEG    x None      Code status     Full code    x DNR      PHYSICAL EXAMINATION AT DISCHARGE:  General : alert x 3, awake, no acute distress  HEENT: EOMI, moist mucus membrane  Neck: supple, no JVD  Chest: Clear to auscultation bilaterally  CVS: RRR, S1 S2 heard, NSR on tele  Abd: soft/ non tender, non distended, BS physiological   Ext: no clubbing, no cyanosis, +1 edema ble  Neuro/Psych: pleasant mood and affect, GONZALEZ spontaneously  Skin: warm     CHRONIC MEDICAL DIAGNOSES:      Greater than 31 minutes were spent with the patient on counseling and coordination of care    Signed:   AL Simms NP  11/1/2024  1:57 PM

## 2024-11-13 PROBLEM — Z11.59 ENCOUNTER FOR SCREENING FOR OTHER VIRAL DISEASES: Status: ACTIVE | Noted: 2024-11-13

## 2024-11-13 PROBLEM — Z72.89 OTHER PROBLEMS RELATED TO LIFESTYLE: Status: ACTIVE | Noted: 2024-11-13

## 2024-11-14 ENCOUNTER — TELEPHONE (OUTPATIENT)
Age: 89
End: 2024-11-14

## 2024-11-14 NOTE — TELEPHONE ENCOUNTER
Called left vm advising of moving treatment appts until after scans being done on 12/2. Provided time and date of appt. Provided c/b # if any adjustments need to be made.

## 2024-11-14 NOTE — TELEPHONE ENCOUNTER
Called CS to schedule PET scan sara Hemphill  PET scan scheduled for Monday 12/2 at 1:30 pm at [facility].

## 2024-11-26 ENCOUNTER — HOSPITAL ENCOUNTER (OUTPATIENT)
Facility: HOSPITAL | Age: 88
Discharge: HOME | End: 2024-11-29
Attending: INTERNAL MEDICINE
Payer: MEDICARE

## 2024-11-26 VITALS
SYSTOLIC BLOOD PRESSURE: 114 MMHG | BODY MASS INDEX: 30.04 KG/M2 | HEIGHT: 59 IN | WEIGHT: 149 LBS | DIASTOLIC BLOOD PRESSURE: 69 MMHG | OXYGEN SATURATION: 100 % | TEMPERATURE: 97.4 F | HEART RATE: 88 BPM | RESPIRATION RATE: 20 BRPM

## 2024-11-26 DIAGNOSIS — J90 PLEURAL EFFUSION: ICD-10-CM

## 2024-11-26 LAB
COMMENT:: NORMAL
SPECIMEN HOLD: NORMAL

## 2024-11-26 PROCEDURE — 6360000002 HC RX W HCPCS

## 2024-11-26 PROCEDURE — 71045 X-RAY EXAM CHEST 1 VIEW: CPT

## 2024-11-26 PROCEDURE — 32555 ASPIRATE PLEURA W/ IMAGING: CPT

## 2024-11-26 RX ORDER — LIDOCAINE HYDROCHLORIDE 10 MG/ML
10 INJECTION, SOLUTION EPIDURAL; INFILTRATION; INTRACAUDAL; PERINEURAL ONCE
Status: COMPLETED | OUTPATIENT
Start: 2024-11-26 | End: 2024-11-26

## 2024-11-26 RX ADMIN — LIDOCAINE HYDROCHLORIDE 10 ML: 10 INJECTION, SOLUTION EPIDURAL; INFILTRATION; INTRACAUDAL; PERINEURAL at 15:49

## 2024-11-26 NOTE — PROGRESS NOTES
Patient arrived to IR in wheelchair and on 2L NC. Brought pt's daughter Maricruz back for consent.       1437-Left thoracentesis completed without complication.   500 ml removed  Post chest xray completed.     1510- Patient discharged in personal wheelchair. DC instructions reviewed with family

## 2024-11-26 NOTE — DISCHARGE INSTRUCTIONS
[facility]  Department of Interventional Radiology  [address], [place] [id number] [phone]    THORACENTESIS DISCHARGE EDUCATION    500 ML      Radiologist:   Annie Calzada PA-C      Date:   November 26, 2024      Information:   Thoracentesis is a procedure that is done to remove a sample of fluid from around the lung. The lung is covered with a tissue called the pleura. The inside of the chest is also lined with pleura. The space between these two areas is called the pleural space. This space normally contains just a thin layer of fluid; however, some conditions such as pneumonia, cancer, or congestive heart failure may cause excessive fluid to develop (pleural effusion). To remove this fluid for evaluation (testing) or to reduce the amount of fluid, a procedure called a thoracentesis is done. Thoracentesis involves placing a thin needle or tube into the pleural space to remove some of the fluid. The needle or tube is inserted through the skin, between the ribs and into the chest. This procedure may be done to remove fluid for testing or for treatment. The needle or tube is removed when the procedure is completed.    What should I expect after the Thoracentesis?    Expect some slight soreness at the site where the catheter was inserted. To relieve pain, take Tylenol (acetaminophen) or the pain medicine your doctor prescribed. Avoid ibuprofen (Advil, Motrin) and aspirin as they may cause you to bleed.   You may notice it is easier to breathe and to take a deep breath. Limit your activity for the rest of the day. Avoid lifting or pushing 10 pounds or more for 1-2 days after this procedure.   You may return to work after 24 hours, unless your primary doctor instructs you otherwise.   You do not have any diet restrictions because of this procedure, but should continue any that were given to you by any other doctors.    Bathing & Wound Care:    You may shower after 24

## 2024-11-27 LAB
APPEARANCE FLD: ABNORMAL
BODY FLD TYPE: NORMAL
COLOR FLD: YELLOW
GLUCOSE FLD-MCNC: 113 MG/DL
LDH FLD L TO P-CCNC: 160 U/L
LYMPHOCYTES NFR FLD: 71 %
MONOS+MACROS NFR FLD: 13 %
NEUTROPHILS NFR FLD: 16 %
NUC CELL # FLD: 2107 /CU MM
PH FLD: 7
PROT FLD-MCNC: 3 G/DL
RBC # FLD: >100 /CU MM
SPECIMEN SOURCE FLD: ABNORMAL
SPECIMEN SOURCE FLD: NORMAL

## 2024-11-27 PROCEDURE — 87206 SMEAR FLUORESCENT/ACID STAI: CPT

## 2024-11-27 PROCEDURE — 83615 LACTATE (LD) (LDH) ENZYME: CPT

## 2024-11-27 PROCEDURE — 87102 FUNGUS ISOLATION CULTURE: CPT

## 2024-11-27 PROCEDURE — 83986 ASSAY PH BODY FLUID NOS: CPT

## 2024-11-27 PROCEDURE — 89050 BODY FLUID CELL COUNT: CPT

## 2024-11-27 PROCEDURE — 84157 ASSAY OF PROTEIN OTHER: CPT

## 2024-11-27 PROCEDURE — 82945 GLUCOSE OTHER FLUID: CPT

## 2024-11-27 PROCEDURE — 87205 SMEAR GRAM STAIN: CPT

## 2024-11-27 PROCEDURE — 87070 CULTURE OTHR SPECIMN AEROBIC: CPT

## 2024-11-27 PROCEDURE — 87116 MYCOBACTERIA CULTURE: CPT

## 2024-12-01 LAB
BACTERIA SPEC CULT: NORMAL
GRAM STN SPEC: NORMAL
SERVICE CMNT-IMP: NORMAL

## 2024-12-02 ENCOUNTER — TELEPHONE (OUTPATIENT)
Age: 88
End: 2024-12-02

## 2024-12-02 LAB
BACTERIA SPEC CULT: NORMAL
CYTOLOGY-NON GYN: NORMAL
FUNGUS SPEC FUNGUS STN: NORMAL
FUNGUS STAIN: NORMAL
SERVICE CMNT-IMP: NORMAL
SPECIMEN SOURCE: NORMAL
SPECIMEN SOURCE: NORMAL

## 2024-12-02 NOTE — TELEPHONE ENCOUNTER
TC with Mei. Stated lvm about patient's appt at [facility] this afternoon. Mei stated wanting to know where patient is going to be getting her scan done this afternoon. Provided Mei with address and phone number to [facility]. Mei expressed understanding and appreciating the call.    #[phone]

## 2024-12-03 LAB
ACID FAST STN SPEC: NEGATIVE
MYCOBACTERIUM SPEC QL CULT: NORMAL
SPECIMEN PREPARATION: NORMAL
SPECIMEN SOURCE: NORMAL

## 2024-12-04 ENCOUNTER — TELEPHONE (OUTPATIENT)
Age: 88
End: 2024-12-04

## 2024-12-05 ENCOUNTER — TELEPHONE (OUTPATIENT)
Age: 88
End: 2024-12-05

## 2024-12-05 NOTE — TELEPHONE ENCOUNTER
Called left vm advising of starting treatment after getting scans done. Advised of adjustments being made to schedule. Provided c/b # if assistance is needed.

## 2024-12-09 LAB
BACTERIA SPEC CULT: NORMAL
SERVICE CMNT-IMP: NORMAL

## 2024-12-10 ENCOUNTER — TELEPHONE (OUTPATIENT)
Age: 88
End: 2024-12-10

## 2024-12-10 NOTE — TELEPHONE ENCOUNTER
TC with patient's daughter (Mei). Stated patient passed away on Sunday, 12/8/2024. Mei wanted to make sure team was made aware and also requested that all scheduled appts are to be canceled.    #[phone]

## 2024-12-16 LAB
BACTERIA SPEC CULT: NORMAL
SERVICE CMNT-IMP: NORMAL

## 2024-12-23 LAB
BACTERIA SPEC CULT: NORMAL
SERVICE CMNT-IMP: NORMAL

## 2024-12-30 LAB
BACTERIA SPEC CULT: NORMAL
SERVICE CMNT-IMP: NORMAL

## 2025-01-15 LAB
ACID FAST STN SPEC: NEGATIVE
MYCOBACTERIUM SPEC QL CULT: NEGATIVE
SPECIMEN PREPARATION: NORMAL
SPECIMEN SOURCE: NORMAL

## 2025-01-22 LAB
FUNGUS STAIN: NORMAL
SPECIMEN SOURCE: NORMAL

## (undated) DEVICE — BIPOLAR SEALER 23-112-1 AQM 6.0: Brand: AQUAMANTYS ®

## (undated) DEVICE — SUTURE VCRL 1 L27IN ABSRB CT BRAID COAT UD J281H

## (undated) DEVICE — SUTURE ETHBND EXCEL SZ 2 L30IN NONABSORBABLE GRN L40MM V-37 MX69G

## (undated) DEVICE — GLOVE ORTHO 8   MSG9480

## (undated) DEVICE — SOLUTION SURG PREP 26 CC PURPREP

## (undated) DEVICE — C-ARM: Brand: UNBRANDED

## (undated) DEVICE — Device: Brand: JELCO

## (undated) DEVICE — BLADE SAW W11.2XL77.5MM THK0.76MM CUT THK1.17MM REPL RECIP

## (undated) DEVICE — 4-PORT MANIFOLD: Brand: NEPTUNE 2

## (undated) DEVICE — BLADE ELECTRODE: Brand: EDGE

## (undated) DEVICE — PREP SKN CHLRAPRP APL 26ML STR --

## (undated) DEVICE — SYR 20ML LL STRL LF --

## (undated) DEVICE — TOTAL TRAY, 16FR 10ML SIL FOLEY, URN: Brand: MEDLINE

## (undated) DEVICE — DRESSING HYDROCOLLOID BORDER 35X10 IN ALUM PRIMASEAL

## (undated) DEVICE — SCRUB DRY SURG EZ SCRUB BRUSH PREOPERATIVE GRN

## (undated) DEVICE — TOTAL JOINT - SMH: Brand: MEDLINE INDUSTRIES, INC.

## (undated) DEVICE — DRSG POSTOP PRMSL AG 3.5X14IN

## (undated) DEVICE — SPONGE GZ W4XL4IN COT 12 PLY TYP VII WVN C FLD DSGN

## (undated) DEVICE — DERMABOND SKIN ADH 0.7ML -- DERMABOND ADVANCED 12/BX

## (undated) DEVICE — CONTAINER,SPECIMEN,3OZ,OR STRL: Brand: MEDLINE

## (undated) DEVICE — INTENT OT USE PROVIDES A STERILE INTERFACE BETWEEN THE OPERATING ROOM SURGICAL LAMPS (NON-STERILE) AND THE SURGEON OR STAFF WORKING IN THE STERILE FIELD.: Brand: ASPEN® ALC PLUS LIGHT HANDLE COVER

## (undated) DEVICE — GOWN,SIRUS,NONRNF,SETINSLV,2XL,18/CS: Brand: MEDLINE

## (undated) DEVICE — SOLUTION IRRIG 1000ML STRL H2O USP PLAS POUR BTL

## (undated) DEVICE — T4 HOOD

## (undated) DEVICE — GLOVE SURG SZ 65 L12IN FNGR THK79MIL GRN LTX FREE

## (undated) DEVICE — COVER,MAYO STAND,STERILE: Brand: MEDLINE

## (undated) DEVICE — SYR LR LCK 1ML GRAD NSAF 30ML --

## (undated) DEVICE — SUTURE STRATAFIX SYMMETRIC PDS + SZ 1 L18IN ABSRB VLT L48MM SXPP1A400

## (undated) DEVICE — SOLUTION IRRIG 3000ML 0.9% SOD CHL USP UROMATIC PLAS CONT

## (undated) DEVICE — SUTURE VCRL SZ 2-0 L36IN ABSRB UD L40MM CT 1/2 CIR J957H

## (undated) DEVICE — GLOVE SURG SZ 8 L12IN FNGR THK94MIL STD WHT LTX FREE

## (undated) DEVICE — PADDING CAST SPEC 6INX4YD COT --

## (undated) DEVICE — HANDPIECE SET WITH BONE CLEANING TIP AND SUCTION TUBE: Brand: INTERPULSE

## (undated) DEVICE — HYPODERMIC SAFETY NEEDLE: Brand: MAGELLAN

## (undated) DEVICE — 3M™ STERI-DRAPE™ U-DRAPE 1015: Brand: STERI-DRAPE™

## (undated) DEVICE — GLOVE SURG SZ 65 L12IN FNGR THK94MIL STD WHT LTX FREE

## (undated) DEVICE — SUTURE MCRYL SZ 3-0 L27IN ABSRB UD L24MM PS-1 3/8 CIR PRIM Y936H

## (undated) DEVICE — 6619 2 PTNT ISO SYS INCISE AREA&LT;(&GT;&&LT;)&GT;P: Brand: STERI-DRAPE™ IOBAN™ 2